# Patient Record
Sex: MALE | Race: OTHER | Employment: UNEMPLOYED | URBAN - METROPOLITAN AREA
[De-identification: names, ages, dates, MRNs, and addresses within clinical notes are randomized per-mention and may not be internally consistent; named-entity substitution may affect disease eponyms.]

---

## 2017-02-27 ENCOUNTER — TELEPHONE (OUTPATIENT)
Dept: INTERNAL MEDICINE CLINIC | Age: 62
End: 2017-02-27

## 2017-02-27 NOTE — TELEPHONE ENCOUNTER
Called the number listed but was unable to speak with anyone. I was sent to was a general mailbox. Did not leave a message.

## 2017-02-27 NOTE — TELEPHONE ENCOUNTER
Patient's caregiver called in and stated that the patient missed the 1 pm dose of the medication carafate. The group home stated that they do not know what to do. The next dose is at 6 pm. Please advise.

## 2017-02-28 ENCOUNTER — HOSPITAL ENCOUNTER (OUTPATIENT)
Dept: LAB | Age: 62
Discharge: HOME | End: 2017-02-28
Payer: MEDICARE

## 2017-02-28 ENCOUNTER — OFFICE VISIT (OUTPATIENT)
Dept: INTERNAL MEDICINE CLINIC | Age: 62
End: 2017-02-28

## 2017-02-28 VITALS
SYSTOLIC BLOOD PRESSURE: 116 MMHG | DIASTOLIC BLOOD PRESSURE: 74 MMHG | HEART RATE: 79 BPM | TEMPERATURE: 98.6 F | RESPIRATION RATE: 17 BRPM | OXYGEN SATURATION: 98 % | HEIGHT: 60 IN

## 2017-02-28 DIAGNOSIS — R11.2 NON-INTRACTABLE VOMITING WITH NAUSEA, UNSPECIFIED VOMITING TYPE: ICD-10-CM

## 2017-02-28 DIAGNOSIS — I10 ESSENTIAL HYPERTENSION: ICD-10-CM

## 2017-02-28 DIAGNOSIS — Z79.899 ENCOUNTER FOR LONG-TERM (CURRENT) USE OF MEDICATIONS: ICD-10-CM

## 2017-02-28 DIAGNOSIS — R05.9 COUGH: Primary | ICD-10-CM

## 2017-02-28 DIAGNOSIS — E78.5 DYSLIPIDEMIA: ICD-10-CM

## 2017-02-28 DIAGNOSIS — E55.9 VITAMIN D DEFICIENCY: ICD-10-CM

## 2017-02-28 LAB
ALBUMIN SERPL BCP-MCNC: 3 G/DL (ref 3.4–5)
ALBUMIN/GLOB SERPL: 0.8 {RATIO} (ref 0.8–1.7)
ALP SERPL-CCNC: 68 U/L (ref 45–117)
ALT SERPL-CCNC: 23 U/L (ref 16–61)
ANION GAP BLD CALC-SCNC: 10 MMOL/L (ref 3–18)
AST SERPL W P-5'-P-CCNC: 24 U/L (ref 15–37)
BASOPHILS # BLD AUTO: 0 K/UL (ref 0–0.06)
BASOPHILS # BLD: 0 % (ref 0–2)
BILIRUB SERPL-MCNC: 0.2 MG/DL (ref 0.2–1)
BUN SERPL-MCNC: 18 MG/DL (ref 7–18)
BUN/CREAT SERPL: 22 (ref 12–20)
CALCIUM SERPL-MCNC: 8.1 MG/DL (ref 8.5–10.1)
CHLORIDE SERPL-SCNC: 101 MMOL/L (ref 100–108)
CHOLEST SERPL-MCNC: 78 MG/DL
CO2 SERPL-SCNC: 26 MMOL/L (ref 21–32)
CREAT SERPL-MCNC: 0.83 MG/DL (ref 0.6–1.3)
DIFFERENTIAL METHOD BLD: ABNORMAL
EOSINOPHIL # BLD: 0 K/UL (ref 0–0.4)
EOSINOPHIL NFR BLD: 1 % (ref 0–5)
ERYTHROCYTE [DISTWIDTH] IN BLOOD BY AUTOMATED COUNT: 19 % (ref 11.6–14.5)
GLOBULIN SER CALC-MCNC: 3.7 G/DL (ref 2–4)
GLUCOSE SERPL-MCNC: 100 MG/DL (ref 74–99)
HBA1C MFR BLD: <3.5 % (ref 4.2–5.6)
HCT VFR BLD AUTO: 22.6 % (ref 36–48)
HDLC SERPL-MCNC: 31 MG/DL (ref 40–60)
HDLC SERPL: 2.5 {RATIO} (ref 0–5)
HGB BLD-MCNC: 6.2 G/DL (ref 13–16)
LDLC SERPL CALC-MCNC: 29.4 MG/DL (ref 0–100)
LIPID PROFILE,FLP: ABNORMAL
LYMPHOCYTES # BLD AUTO: 13 % (ref 21–52)
LYMPHOCYTES # BLD: 0.9 K/UL (ref 0.9–3.6)
MCH RBC QN AUTO: 18.2 PG (ref 24–34)
MCHC RBC AUTO-ENTMCNC: 27.4 G/DL (ref 31–37)
MCV RBC AUTO: 66.5 FL (ref 74–97)
MONOCYTES # BLD: 0.5 K/UL (ref 0.05–1.2)
MONOCYTES NFR BLD AUTO: 7 % (ref 3–10)
NEUTS SEG # BLD: 5.2 K/UL (ref 1.8–8)
NEUTS SEG NFR BLD AUTO: 79 % (ref 40–73)
PLATELET # BLD AUTO: 273 K/UL (ref 135–420)
PMV BLD AUTO: 10 FL (ref 9.2–11.8)
POTASSIUM SERPL-SCNC: 3.7 MMOL/L (ref 3.5–5.5)
PROT SERPL-MCNC: 6.7 G/DL (ref 6.4–8.2)
RBC # BLD AUTO: 3.4 M/UL (ref 4.7–5.5)
SODIUM SERPL-SCNC: 137 MMOL/L (ref 136–145)
T4 FREE SERPL-MCNC: 1.1 NG/DL (ref 0.7–1.5)
TRIGL SERPL-MCNC: 88 MG/DL (ref ?–150)
TSH SERPL DL<=0.05 MIU/L-ACNC: 2.54 UIU/ML (ref 0.36–3.74)
VLDLC SERPL CALC-MCNC: 17.6 MG/DL
WBC # BLD AUTO: 6.6 K/UL (ref 4.6–13.2)

## 2017-02-28 PROCEDURE — 80061 LIPID PANEL: CPT | Performed by: HOSPITALIST

## 2017-02-28 PROCEDURE — 84439 ASSAY OF FREE THYROXINE: CPT | Performed by: HOSPITALIST

## 2017-02-28 PROCEDURE — 83036 HEMOGLOBIN GLYCOSYLATED A1C: CPT | Performed by: HOSPITALIST

## 2017-02-28 PROCEDURE — 36415 COLL VENOUS BLD VENIPUNCTURE: CPT | Performed by: HOSPITALIST

## 2017-02-28 PROCEDURE — 84443 ASSAY THYROID STIM HORMONE: CPT | Performed by: HOSPITALIST

## 2017-02-28 PROCEDURE — 85025 COMPLETE CBC W/AUTO DIFF WBC: CPT | Performed by: HOSPITALIST

## 2017-02-28 PROCEDURE — 82306 VITAMIN D 25 HYDROXY: CPT | Performed by: HOSPITALIST

## 2017-02-28 PROCEDURE — 80053 COMPREHEN METABOLIC PANEL: CPT | Performed by: HOSPITALIST

## 2017-02-28 RX ORDER — METOCLOPRAMIDE 10 MG/1
10 TABLET ORAL
Qty: 40 TAB | Refills: 0 | Status: SHIPPED | OUTPATIENT
Start: 2017-02-28 | End: 2017-02-28 | Stop reason: SDUPTHER

## 2017-02-28 RX ORDER — METOCLOPRAMIDE 10 MG/1
10 TABLET ORAL
Qty: 40 TAB | Refills: 0 | Status: SHIPPED | OUTPATIENT
Start: 2017-02-28 | End: 2017-03-09

## 2017-02-28 RX ORDER — HYDROCODONE POLISTIREX AND CHLORPHENIRAMINE POLISTIREX 10; 8 MG/5ML; MG/5ML
1 SUSPENSION, EXTENDED RELEASE ORAL
Qty: 240 ML | Refills: 0 | Status: SHIPPED | OUTPATIENT
Start: 2017-02-28 | End: 2017-03-09

## 2017-02-28 NOTE — PATIENT INSTRUCTIONS
Nausea and Vomiting: Care Instructions  Your Care Instructions    When you are nauseated, you may feel weak and sweaty and notice a lot of saliva in your mouth. Nausea often leads to vomiting. Most of the time you do not need to worry about nausea and vomiting, but they can be signs of other illnesses. Two common causes of nausea and vomiting are stomach flu and food poisoning. Nausea and vomiting from viral stomach flu will usually start to improve within 24 hours. Nausea and vomiting from food poisoning may last from 12 to 48 hours. The doctor has checked you carefully, but problems can develop later. If you notice any problems or new symptoms, get medical treatment right away. Follow-up care is a key part of your treatment and safety. Be sure to make and go to all appointments, and call your doctor if you are having problems. It's also a good idea to know your test results and keep a list of the medicines you take. How can you care for yourself at home? · To prevent dehydration, drink plenty of fluids, enough so that your urine is light yellow or clear like water. Choose water and other caffeine-free clear liquids until you feel better. If you have kidney, heart, or liver disease and have to limit fluids, talk with your doctor before you increase the amount of fluids you drink. · Rest in bed until you feel better. · When you are able to eat, try clear soups, mild foods, and liquids until all symptoms are gone for 12 to 48 hours. Other good choices include dry toast, crackers, cooked cereal, and gelatin dessert, such as Jell-O. When should you call for help? Call 911 anytime you think you may need emergency care. For example, call if:  · You passed out (lost consciousness). Call your doctor now or seek immediate medical care if:  · You have symptoms of dehydration, such as:  ¨ Dry eyes and a dry mouth. ¨ Passing only a little dark urine.   ¨ Feeling thirstier than usual.  · You have new or worsening belly pain. · You have a new or higher fever. · You vomit blood or what looks like coffee grounds. Watch closely for changes in your health, and be sure to contact your doctor if:  · You have ongoing nausea and vomiting. · Your vomiting is getting worse. · Your vomiting lasts longer than 2 days. · You are not getting better as expected. Where can you learn more? Go to http://haven-magdi.info/. Enter 25 [id number] in the search box to learn more about \"Nausea and Vomiting: Care Instructions. \"  Current as of: May 27, 2016  Content Version: 11.1  © 2867-8633 [employer]. Care instructions adapted under license by [employer] (which disclaims liability or warranty for this information). If you have questions about a medical condition or this instruction, always ask your healthcare professional. [address] any warranty or liability for your use of this information. Cough: Care Instructions  Your Care Instructions  A cough is your body's response to something that bothers your throat or airways. Many things can cause a cough. You might cough because of a cold or the flu, bronchitis, or asthma. Smoking, postnasal drip, allergies, and stomach acid that backs up into your throat also can cause coughs. A cough is a symptom, not a disease. Most coughs stop when the cause, such as a cold, goes away. You can take a few steps at home to cough less and feel better. Follow-up care is a key part of your treatment and safety. Be sure to make and go to all appointments, and call your doctor if you are having problems. It's also a good idea to know your test results and keep a list of the medicines you take. How can you care for yourself at home? · Drink lots of water and other fluids. This helps thin the mucus and soothes a dry or sore throat. Honey or lemon juice in hot water or tea may ease a dry cough.   · Take cough medicine as directed by your doctor. · Prop up your head on pillows to help you breathe and ease a dry cough. · Try cough drops to soothe a dry or sore throat. Cough drops don't stop a cough. Medicine-flavored cough drops are no better than candy-flavored drops or hard candy. · Do not smoke. Avoid secondhand smoke. If you need help quitting, talk to your doctor about stop-smoking programs and medicines. These can increase your chances of quitting for good. When should you call for help? Call 911 anytime you think you may need emergency care. For example, call if:  · You have severe trouble breathing. Call your doctor now or seek immediate medical care if:  · You cough up blood. · You have new or worse trouble breathing. · You have a new or higher fever. · You have a new rash. Watch closely for changes in your health, and be sure to contact your doctor if:  · You cough more deeply or more often, especially if you notice more mucus or a change in the color of your mucus. · You have new symptoms, such as a sore throat, an earache, or sinus pain. · You do not get better as expected. Where can you learn more? Go to http://haven-magdi.info/. Enter D279 in the search box to learn more about \"Cough: Care Instructions. \"  Current as of: May 27, 2016  Content Version: 11.1  © 4003-3104 [employer]. Care instructions adapted under license by [employer] (which disclaims liability or warranty for this information). If you have questions about a medical condition or this instruction, always ask your healthcare professional. [place] [address] any warranty or liability for your use of this information.

## 2017-02-28 NOTE — PROGRESS NOTES
Chief Complaint   Patient presents with    Cough    Vomiting     Patient is here today with complaint of vomiting and coughing that started on Thursday. Per the caretaker, the symptoms started on Thursday and [facility] He was prescribed zofran as needed. Per care provider, the patient last vomited yesterday morning and vomited only once yesterday. She stated that prior to that, he vomited 5 times Thursday, Friday, Saturday and Sunday. Stated that flu test [facility] completed at [facility] and it came back negative. 1. Have you been to the ER, urgent care clinic since your last visit? Hospitalized since your last visit? Yes patient    2. Have you seen or consulted any other health care providers outside of the [address] [place] [address] since your last visit? Include any pap smears or colon screening.  No

## 2017-02-28 NOTE — MR AVS SNAPSHOT
Visit Information Date & Time Provider Department Dept. Phone Encounter #  
 2/28/2017  9:30 AM Leif Downey DO [facility] [phone] Your Appointments 3/7/2017  9:30 AM  
Office Visit with Leif Downey DO [facility] (--) Appt Note: 6 month follow up and labs [address] [place] [address] [phone]  
  
   
 [address] [place] [address] Upcoming Health Maintenance Date Due ZOSTER VACCINE AGE 60> 8/13/2014 DTaP/Tdap/Td series (2 - Td) 3/14/2022 Allergies as of 2/28/2017  Review Complete On: 2/28/2017 By: Leif Downey, DO No Known Allergies Current Immunizations  Reviewed on 8/29/2016 Name Date Influenza Vaccine 10/2/2014 Influenza Vaccine Renu Miu) 8/29/2016 Influenza Vaccine (Quad) PF 9/30/2015 TDAP Vaccine 3/14/2012 Not reviewed this visit You Were Diagnosed With   
  
 Codes Comments Cough    -  Primary ICD-10-CM: L34 ICD-9-CM: 786.2 Non-intractable vomiting with nausea, unspecified vomiting type     ICD-10-CM: R11.2 ICD-9-CM: 787.01 Vitals BP  
  
  
  
  
  
 116/74 (BP 1 Location: Left arm, BP Patient Position: Sitting) Vitals History Preferred Pharmacy Pharmacy Name Phone [facility] [address] [facility] 2/3 [phone] Your Updated Medication List  
  
   
This list is accurate as of: 2/28/17 10:00 AM.  Always use your most recent med list.  
  
  
  
  
 MALATHI PROTECT topical cream  
Generic drug:  dimethicone-zinc oxide APPLY TO BUTTOCKS AS NEEDED  
  
 bisacodyl 5 mg EC tablet Commonly known as:  DULCOLAX TAKE 2 TABLETS BY MOUTH ON MON WED FRI FOR LAXATIVE  
  
 carvedilol 3.125 mg tablet Commonly known as:  Jestine Pellet Take 1 Tab by mouth two (2) times daily (with meals). chlorpheniramine-HYDROcodone 10-8 mg/5 mL suspension Commonly known as:  Floyce Yvonne Take 5 mL by mouth every twelve (12) hours as needed for Cough. Max Daily Amount: 10 mL. cyanocobalamin 2,500 mcg sublingual tablet Commonly known as:  VITAMIN B12 Take 1 Tab by mouth daily. docosanol 10 % topical cream  
Commonly known as:  Silvio Center Apply  to affected area five (5) times daily. enalapril 10 mg tablet Commonly known as:  Yasmin Henry Take 1 Tab by mouth two (2) times a day. ergocalciferol 50,000 unit capsule Commonly known as:  ERGOCALCIFEROL Take 1 Cap by mouth every seven (7) days. hydroCHLOROthiazide 25 mg tablet Commonly known as:  HYDRODIURIL Take 1 Tab by mouth daily. Iron [id number]-Suc-Stoma tablet Commonly known as:  Kirk Gold Take 1 Tab by mouth two (2) times a day. metoclopramide HCl 10 mg tablet Commonly known as:  REGLAN Take 1 Tab by mouth Before breakfast, lunch, dinner and at bedtime for 10 days. morphine CR 15 mg CR tablet Commonly known as:  MS CONTIN Take 1 Tab by mouth two (2) times daily as needed. Max Daily Amount: 30 mg.  
  
 multivit with min-FA-lycopene 400-300 mcg Tab Commonly known as:  ONE-A-DAY MEN'S MULTIVITAMIN Take 1 Each by mouth daily. neomycin-bacitracnZn-polymyxnB 3.5-400-5,000 mg-unit-unit Oipk ointment Commonly known as:  TRIPLE ANTIBIOTIC Apply 1 Packet to affected area two (2) times a day. * OTHER May use applesauce for oral medication administration. * OTHER Listerine to use with oral swabs for mouth care twice a day. oxyCODONE IR 5 mg immediate release tablet Commonly known as:  Charolet Music Take 1-2 Tabs by mouth every four (4) hours as needed for Pain. Max Daily Amount: 60 mg.  
  
 pantoprazole 40 mg tablet Commonly known as:  PROTONIX  
TAKE 1 TABLET BY MOUTH TWICE DAILY FOR GERD potassium chloride 10 mEq tablet Commonly known as:  K-DUR, KLOR-CON  
TAKE 1 TABLET BY MOUTH ONCE DAILY FOR LOW BLOOD LEVELS (MAY CRUSH ANDTAKE WITH APPLESAUCE)  
  
 raNITIdine 150 mg tablet Commonly known as:  ZANTAC Take 1 Tab by mouth two (2) times a day. senna-docusate 8.6-50 mg per tablet Commonly known as:  Kasie Mt Take 1 Tab by mouth two (2) times a day. simvastatin 20 mg tablet Commonly known as:  ZOCOR Take 1 Tab by mouth nightly. sorbitol 70 % solution TAKE 30ML BY MOUTH TWICE DAILY FOR LAXATIVE  
  
 sucralfate 1 gram tablet Commonly known as:  CARAFATE  
TAKE 1 TABLET BY MOUTH FOUR TIMES DAILY (AFTER MEALS & AT BEDTIME) FOR GERD Swab Swab Commonly known as:  TOOTHETTE Sig: Use as needed * Notice: This list has 2 medication(s) that are the same as other medications prescribed for you. Read the directions carefully, and ask your doctor or other care provider to review them with you. Prescriptions Printed Refills  
 chlorpheniramine-HYDROcodone (TUSSIONEX) 10-8 mg/5 mL suspension 0 Sig: Take 5 mL by mouth every twelve (12) hours as needed for Cough. Max Daily Amount: 10 mL. Class: Print Route: Oral  
 metoclopramide HCl (REGLAN) 10 mg tablet 0 Sig: Take 1 Tab by mouth Before breakfast, lunch, dinner and at bedtime for 10 days. Class: Print Route: Oral  
  
Patient Instructions Nausea and Vomiting: Care Instructions Your Care Instructions When you are nauseated, you may feel weak and sweaty and notice a lot of saliva in your mouth. Nausea often leads to vomiting. Most of the time you do not need to worry about nausea and vomiting, but they can be signs of other illnesses. Two common causes of nausea and vomiting are stomach flu and food poisoning. Nausea and vomiting from viral stomach flu will usually start to improve within 24 hours. Nausea and vomiting from food poisoning may last from 12 to 48 hours. The doctor has checked you carefully, but problems can develop later. If you notice any problems or new symptoms, get medical treatment right away. Follow-up care is a key part of your treatment and safety. Be sure to make and go to all appointments, and call your doctor if you are having problems. It's also a good idea to know your test results and keep a list of the medicines you take. How can you care for yourself at home? · To prevent dehydration, drink plenty of fluids, enough so that your urine is light yellow or clear like water. Choose water and other caffeine-free clear liquids until you feel better. If you have kidney, heart, or liver disease and have to limit fluids, talk with your doctor before you increase the amount of fluids you drink. · Rest in bed until you feel better. · When you are able to eat, try clear soups, mild foods, and liquids until all symptoms are gone for 12 to 48 hours. Other good choices include dry toast, crackers, cooked cereal, and gelatin dessert, such as Jell-O. When should you call for help? Call 911 anytime you think you may need emergency care. For example, call if: 
· You passed out (lost consciousness). Call your doctor now or seek immediate medical care if: 
· You have symptoms of dehydration, such as: ¨ Dry eyes and a dry mouth. ¨ Passing only a little dark urine. ¨ Feeling thirstier than usual. 
· You have new or worsening belly pain. · You have a new or higher fever. · You vomit blood or what looks like coffee grounds. Watch closely for changes in your health, and be sure to contact your doctor if: 
· You have ongoing nausea and vomiting. · Your vomiting is getting worse. · Your vomiting lasts longer than 2 days. · You are not getting better as expected. Where can you learn more? Go to http://haven-magdi.info/. Enter 25 [id number] in the search box to learn more about \"Nausea and Vomiting: Care Instructions. \" Current as of: May 27, 2016 Content Version: 11.1 © 8733-1898 [employer]. Care instructions adapted under license by [employer] (which disclaims liability or warranty for this information). If you have questions about a medical condition or this instruction, always ask your healthcare professional. [address] any warranty or liability for your use of this information. Cough: Care Instructions Your Care Instructions A cough is your body's response to something that bothers your throat or airways. Many things can cause a cough. You might cough because of a cold or the flu, bronchitis, or asthma. Smoking, postnasal drip, allergies, and stomach acid that backs up into your throat also can cause coughs. A cough is a symptom, not a disease. Most coughs stop when the cause, such as a cold, goes away. You can take a few steps at home to cough less and feel better. Follow-up care is a key part of your treatment and safety. Be sure to make and go to all appointments, and call your doctor if you are having problems. It's also a good idea to know your test results and keep a list of the medicines you take. How can you care for yourself at home? · Drink lots of water and other fluids. This helps thin the mucus and soothes a dry or sore throat. Honey or lemon juice in hot water or tea may ease a dry cough. · Take cough medicine as directed by your doctor. · Prop up your head on pillows to help you breathe and ease a dry cough. · Try cough drops to soothe a dry or sore throat. Cough drops don't stop a cough. Medicine-flavored cough drops are no better than candy-flavored drops or hard candy. · Do not smoke. Avoid secondhand smoke. If you need help quitting, talk to your doctor about stop-smoking programs and medicines. These can increase your chances of quitting for good. When should you call for help? Call 911 anytime you think you may need emergency care. For example, call if: · You have severe trouble breathing. Call your doctor now or seek immediate medical care if: 
· You cough up blood. · You have new or worse trouble breathing. · You have a new or higher fever. · You have a new rash. Watch closely for changes in your health, and be sure to contact your doctor if: 
· You cough more deeply or more often, especially if you notice more mucus or a change in the color of your mucus. · You have new symptoms, such as a sore throat, an earache, or sinus pain. · You do not get better as expected. Where can you learn more? Go to http://haven-magdi.info/. Enter D279 in the search box to learn more about \"Cough: Care Instructions. \" Current as of: May 27, 2016 Content Version: 11.1 © 6276-7458 [employer]. Care instructions adapted under license by [employer] (which disclaims liability or warranty for this information). If you have questions about a medical condition or this instruction, always ask your healthcare professional. [address] any warranty or liability for your use of this information. Introducing [facility]! Pratibha Wilson introduces Euro[employer] patient portal. Now you can access parts of your medical record, email your doctor's office, and request medication refills online. 1. In your internet browser, go to [email or web address] 2. Click on the First Time User? Click Here link in the Sign In box. You will see the New Member Sign Up page. 3. Enter your Eurocept Access Code exactly as it appears below. You will not need to use this code after youve completed the sign-up process. If you do not sign up before the expiration date, you must request a new code. · [employer] Access Code: [email or web address] Expires: 5/29/2017 10:00 AM 
 
4. Enter the last four digits of your Social Security Number (xxxx) and Date of Birth (mm/dd/yyyy) as indicated and click Submit.  You will be taken to the next sign-up page. 5. Create a Callision ID. This will be your Callision login ID and cannot be changed, so think of one that is secure and easy to remember. 6. Create a Callision password. You can change your password at any time. 7. Enter your Password Reset Question and Answer. This can be used at a later time if you forget your password. 8. Enter your e-mail address. You will receive e-mail notification when new information is available in [address] 9. Click Sign Up. You can now view and download portions of your medical record. 10. Click the Download Summary menu link to download a portable copy of your medical information. If you have questions, please visit the Frequently Asked Questions section of the Callision website. Remember, Callision is NOT to be used for urgent needs. For medical emergencies, dial 911. Now available from your iPhone and Android! Please provide this summary of care documentation to your next provider. Your primary care clinician is listed as [address] If you have any questions after today's visit, please call [phone].

## 2017-02-28 NOTE — PROGRESS NOTES
Chief Complaint   Patient presents with    Cough    Vomiting       HPI:  Patient is a 57year old  male with medical problems listed below presents today for an acute visit with cough and vomiting. He was accompanied by his  Ms. Vanna Christianson to today's visit who stated that he started coughing five days ago and was followed by vomiting, as he vomited multiple times with vomitus non bilious and without blood. He was seen the next day at [facility] and was given Zofran as needed. Despite taking Zofran, he continues to vomit and has vomited several times in the last few days and thus was brought in today for evaluation. 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 Past Medical History:   Diagnosis Date    Constipation, chronic     Dyslipidemia 4/8/2014    Femur fracture ([address] 7/7/16    brace at right femur    GERD (gastroesophageal reflux disease)     Hypertension     MR (mental retardation), severe     Muscle weakness of lower extremity     with spasticity    Urinary incontinence due to cognitive impairment 8/23/2012       No Known Allergies      Current Outpatient Prescriptions   Medication Sig Dispense Refill    MALATHI PROTECT topical cream APPLY TO BUTTOCKS AS NEEDED 142 g 0    potassium chloride (K-DUR, KLOR-CON) 10 mEq tablet TAKE 1 TABLET BY MOUTH ONCE DAILY FOR LOW BLOOD LEVELS (MAY CRUSH ANDTAKE WITH APPLESAUCE) 30 Tab 0    senna-docusate (PERICOLACE) 8.6-50 mg per tablet Take 1 Tab by mouth two (2) times a day. 180 Tab 1    carvedilol (COREG) 3.125 mg tablet Take 1 Tab by mouth two (2) times daily (with meals). 180 Tab 1    morphine CR (MS CONTIN) 15 mg CR tablet Take 1 Tab by mouth two (2) times daily as needed.  Max Daily Amount: 30 mg. 10 Tab 0    neomycin-bacitracnZn-polymyxnB (TRIPLE ANTIBIOTIC) 3.5-400-5,000 mg-unit-unit oipk ointment Apply 1 Packet to affected area two (2) times a day. 1 Packet 0    enalapril (VASOTEC) 10 mg tablet Take 1 Tab by mouth two (2) times a day. 180 Tab 3    ranitidine (ZANTAC) 150 mg tablet Take 1 Tab by mouth two (2) times a day. 180 Tab 2    sucralfate (CARAFATE) 1 gram tablet TAKE 1 TABLET BY MOUTH FOUR TIMES DAILY (AFTER MEALS & AT BEDTIME) FOR GERD 120 Tab 3    oxyCODONE IR (ROXICODONE) 5 mg immediate release tablet Take 1-2 Tabs by mouth every four (4) hours as needed for Pain. Max Daily Amount: 60 mg. 60 Tab 0    ergocalciferol (ERGOCALCIFEROL) 50,000 unit capsule Take 1 Cap by mouth every seven (7) days. 4 Cap 3    simvastatin (ZOCOR) 20 mg tablet Take 1 Tab by mouth nightly. 90 Tab 3    multivit with min-FA-lycopene (ONE-A-DAY MEN'S MULTIVITAMIN) 400-300 mcg tab Take 1 Each by mouth daily. 90 Each 3    Swab (TOOTHETTE) swab Sig: Use as needed 600 Each 3    hydrochlorothiazide (HYDRODIURIL) 25 mg tablet Take 1 Tab by mouth daily. 30 Tab 5    sorbitol 70 % solution TAKE 30ML BY MOUTH TWICE DAILY FOR LAXATIVE 473 mL PRN    OTHER May use applesauce for oral medication administration. 1 Each 0    OTHER Listerine to use with oral swabs for mouth care twice a day. 1 Bottle 11    cyanocobalamin (VITAMIN B12) 2,500 mcg sublingual tablet Take 1 Tab by mouth daily. 30 Tab 6    Iron XazIre-I-S93-Ca-Suc-Stoma (MULTIGEN) tablet Take 1 Tab by mouth two (2) times a day. 60 Tab 3    pantoprazole (PROTONIX) 40 mg tablet TAKE 1 TABLET BY MOUTH TWICE DAILY FOR GERD 60 Tab 3    docosanol (ABREVA) 10 % topical cream Apply  to affected area five (5) times daily.  2 g 0    bisacodyl (DULCOLAX) 5 mg EC tablet TAKE 2 TABLETS BY MOUTH ON MON WED FRI FOR LAXATIVE 24 Tab 2          ROS:  Unable to review as pt mentally challenged        Physical Exam:  Visit Vitals    /74 (BP 1 Location: Left arm, BP Patient Position: Sitting)    Pulse 79    Temp 98.6 °F (37 °C) (Axillary)    Resp 17    Ht 4' 10\" (1.473 m)    SpO2 98%     General: Black male in wheelchair, awake, afebrile, in no acute distress  Respiratory: symmetrical chest expansion, lung sounds clear bilaterally, good air entry  Cardiovascular: normal S1S2, regular rate and rhythm  Abdomen: soft, non-distended, normoactive bowel sounds x 4 quadrants, and no palpable organomegaly noted. Assessment/Plan:    ICD-10-CM ICD-9-CM    1. Cough R05 786.2 Tussionex started today  chlorpheniramine-HYDROcodone (TUSSIONEX) 10-8 mg/5 mL suspension   2. Non-intractable vomiting with nausea, unspecified vomiting type R11.2 787.01 Reglan started today  metoclopramide HCl (REGLAN) 10 mg tablet      DISCONTINUED: metoclopramide HCl (REGLAN) 10 mg tablet         Orders Placed This Encounter    DISCONTD: metoclopramide HCl (REGLAN) 10 mg tablet     Sig: Take 1 Tab by mouth Before breakfast, lunch, dinner and at bedtime for 10 days. Dispense:  40 Tab     Refill:  0    chlorpheniramine-HYDROcodone (TUSSIONEX) 10-8 mg/5 mL suspension     Sig: Take 5 mL by mouth every twelve (12) hours as needed for Cough. Max Daily Amount: 10 mL. Dispense:  240 mL     Refill:  0    metoclopramide HCl (REGLAN) 10 mg tablet     Sig: Take 1 Tab by mouth Before breakfast, lunch, dinner and at bedtime for 10 days. Dispense:  40 Tab     Refill:  0         Group home paperwork filled out        Additional Notes: Discussed today's diagnosis, treatment plans. Discussed medication indications and side effects. After Visit Summary: Discussed provided printed patient instructions. Answered questions accordingly.   Follow-up Disposition: As previously scheduled        Abdi Moseley DO, MPH  Internal Medicine

## 2017-02-28 NOTE — TELEPHONE ENCOUNTER
Called to follow up with the providers message. Was informed that \"cherry thuy\" no longer works at the facility nor has she for a while. Was informed that the person that called was ventura khoury not cherry thuy. Pt continued dose as prescribed.

## 2017-03-01 LAB — 25(OH)D3 SERPL-MCNC: 108.6 NG/ML (ref 30–100)

## 2017-03-07 ENCOUNTER — HOSPITAL ENCOUNTER (OUTPATIENT)
Dept: LAB | Age: 62
Discharge: HOME | End: 2017-03-07
Payer: MEDICARE

## 2017-03-07 ENCOUNTER — OFFICE VISIT (OUTPATIENT)
Dept: INTERNAL MEDICINE CLINIC | Age: 62
End: 2017-03-07

## 2017-03-07 VITALS
TEMPERATURE: 97.8 F | SYSTOLIC BLOOD PRESSURE: 104 MMHG | RESPIRATION RATE: 16 BRPM | HEIGHT: 60 IN | HEART RATE: 96 BPM | DIASTOLIC BLOOD PRESSURE: 68 MMHG | OXYGEN SATURATION: 98 %

## 2017-03-07 DIAGNOSIS — D62 ACUTE BLOOD LOSS ANEMIA: ICD-10-CM

## 2017-03-07 DIAGNOSIS — E78.5 DYSLIPIDEMIA: ICD-10-CM

## 2017-03-07 DIAGNOSIS — I10 ESSENTIAL HYPERTENSION: Primary | ICD-10-CM

## 2017-03-07 DIAGNOSIS — K21.9 GASTROESOPHAGEAL REFLUX DISEASE WITHOUT ESOPHAGITIS: ICD-10-CM

## 2017-03-07 DIAGNOSIS — E55.9 VITAMIN D DEFICIENCY: ICD-10-CM

## 2017-03-07 DIAGNOSIS — K59.09 CONSTIPATION, CHRONIC: ICD-10-CM

## 2017-03-07 LAB
BASOPHILS # BLD AUTO: 0 K/UL (ref 0–0.06)
BASOPHILS # BLD: 0 % (ref 0–2)
DIFFERENTIAL METHOD BLD: ABNORMAL
EOSINOPHIL # BLD: 0.2 K/UL (ref 0–0.4)
EOSINOPHIL NFR BLD: 3 % (ref 0–5)
ERYTHROCYTE [DISTWIDTH] IN BLOOD BY AUTOMATED COUNT: 20.2 % (ref 11.6–14.5)
HCT VFR BLD AUTO: 24.1 % (ref 36–48)
HGB BLD-MCNC: 6.4 G/DL (ref 13–16)
LYMPHOCYTES # BLD AUTO: 22 % (ref 21–52)
LYMPHOCYTES # BLD: 1.6 K/UL (ref 0.9–3.6)
MCH RBC QN AUTO: 17.6 PG (ref 24–34)
MCHC RBC AUTO-ENTMCNC: 26.6 G/DL (ref 31–37)
MCV RBC AUTO: 66.2 FL (ref 74–97)
MONOCYTES # BLD: 0.9 K/UL (ref 0.05–1.2)
MONOCYTES NFR BLD AUTO: 12 % (ref 3–10)
NEUTS SEG # BLD: 4.6 K/UL (ref 1.8–8)
NEUTS SEG NFR BLD AUTO: 63 % (ref 40–73)
PLATELET # BLD AUTO: 687 K/UL (ref 135–420)
PMV BLD AUTO: 10 FL (ref 9.2–11.8)
RBC # BLD AUTO: 3.64 M/UL (ref 4.7–5.5)
WBC # BLD AUTO: 7.4 K/UL (ref 4.6–13.2)

## 2017-03-07 PROCEDURE — 36415 COLL VENOUS BLD VENIPUNCTURE: CPT | Performed by: HOSPITALIST

## 2017-03-07 PROCEDURE — 85025 COMPLETE CBC W/AUTO DIFF WBC: CPT | Performed by: HOSPITALIST

## 2017-03-07 NOTE — PATIENT INSTRUCTIONS
DASH Diet: Care Instructions  Your Care Instructions  The DASH diet is an eating plan that can help lower your blood pressure. DASH stands for Dietary Approaches to Stop Hypertension. Hypertension is high blood pressure. The DASH diet focuses on eating foods that are high in calcium, potassium, and magnesium. These nutrients can lower blood pressure. The foods that are highest in these nutrients are fruits, vegetables, low-fat dairy products, nuts, seeds, and legumes. But taking calcium, potassium, and magnesium supplements instead of eating foods that are high in those nutrients does not have the same effect. The DASH diet also includes whole grains, fish, and poultry. The DASH diet is one of several lifestyle changes your doctor may recommend to lower your high blood pressure. Your doctor may also want you to decrease the amount of sodium in your diet. Lowering sodium while following the DASH diet can lower blood pressure even further than just the DASH diet alone. Follow-up care is a key part of your treatment and safety. Be sure to make and go to all appointments, and call your doctor if you are having problems. It's also a good idea to know your test results and keep a list of the medicines you take. How can you care for yourself at home? Following the DASH diet  · Eat 4 to 5 servings of fruit each day. A serving is 1 medium-sized piece of fruit, ½ cup chopped or canned fruit, 1/4 cup dried fruit, or 4 ounces (½ cup) of fruit juice. Choose fruit more often than fruit juice. · Eat 4 to 5 servings of vegetables each day. A serving is 1 cup of lettuce or raw leafy vegetables, ½ cup of chopped or cooked vegetables, or 4 ounces (½ cup) of vegetable juice. Choose vegetables more often than vegetable juice. · Get 2 to 3 servings of low-fat and fat-free dairy each day. A serving is 8 ounces of milk, 1 cup of yogurt, or 1 ½ ounces of cheese. · Eat 6 to 8 servings of grains each day.  A serving is 1 slice of bread, 1 ounce of dry cereal, or ½ cup of cooked rice, pasta, or cooked cereal. Try to choose whole-grain products as much as possible. · Limit lean meat, poultry, and fish to 2 servings each day. A serving is 3 ounces, about the size of a deck of cards. · Eat 4 to 5 servings of nuts, seeds, and legumes (cooked dried beans, lentils, and split peas) each week. A serving is 1/3 cup of nuts, 2 tablespoons of seeds, or ½ cup of cooked beans or peas. · Limit fats and oils to 2 to 3 servings each day. A serving is 1 teaspoon of vegetable oil or 2 tablespoons of salad dressing. · Limit sweets and added sugars to 5 servings or less a week. A serving is 1 tablespoon jelly or jam, ½ cup sorbet, or 1 cup of lemonade. · Eat less than 2,300 milligrams (mg) of sodium a day. If you limit your sodium to 1,500 mg a day, you can lower your blood pressure even more. Tips for success  · Start small. Do not try to make dramatic changes to your diet all at once. You might feel that you are missing out on your favorite foods and then be more likely to not follow the plan. Make small changes, and stick with them. Once those changes become habit, add a few more changes. · Try some of the following:  ¨ Make it a goal to eat a fruit or vegetable at every meal and at snacks. This will make it easy to get the recommended amount of fruits and vegetables each day. ¨ Try yogurt topped with fruit and nuts for a snack or healthy dessert. ¨ Add lettuce, tomato, cucumber, and onion to sandwiches. ¨ Combine a ready-made pizza crust with low-fat mozzarella cheese and lots of vegetable toppings. Try using tomatoes, squash, spinach, broccoli, carrots, cauliflower, and onions. ¨ Have a variety of cut-up vegetables with a low-fat dip as an appetizer instead of chips and dip. ¨ Sprinkle sunflower seeds or chopped almonds over salads. Or try adding chopped walnuts or almonds to cooked vegetables. ¨ Try some vegetarian meals using beans and peas. Add garbanzo or kidney beans to salads. Make burritos and tacos with mashed vásquez beans or black beans. Where can you learn more? Go to http://haven-magdi.info/. Enter D345 in the search box to learn more about \"DASH Diet: Care Instructions. \"  Current as of: March 23, 2016  Content Version: 11.1  © 5733-0921 [employer]. Care instructions adapted under license by [employer] (which disclaims liability or warranty for this information). If you have questions about a medical condition or this instruction, always ask your healthcare professional. [place] [address] any warranty or liability for your use of this information. Anemia: Care Instructions  Your Care Instructions    Anemia is a low level of red blood cells, which carry oxygen throughout your body. Many things can cause anemia. Lack of iron is one of the most common causes. Your body needs iron to make hemoglobin, a substance in red blood cells that carries oxygen from the lungs to your body's cells. Without enough iron, the body produces fewer and smaller red blood cells. As a result, your body's cells do not get enough oxygen, and you feel tired and weak. And you may have trouble concentrating. Bleeding is the most common cause of a lack of iron. You may have heavy menstrual bleeding or bleeding caused by conditions such as ulcers, hemorrhoids, or cancer. Regular use of aspirin or other anti-inflammatory medicines (such as ibuprofen) also can cause bleeding in some people. A lack of iron in your diet also can cause anemia, especially at times when the body needs more iron, such as during pregnancy, infancy, and the teen years. Your doctor may have prescribed iron pills. It may take several months of treatment for your iron levels to return to normal. Your doctor also may suggest that you eat foods that are rich in iron, such as meat and beans. There are many other causes of anemia.  It is not always due to a lack of iron. Finding the specific cause of your anemia will help your doctor find the right treatment for you. Follow-up care is a key part of your treatment and safety. Be sure to make and go to all appointments, and call your doctor if you are having problems. It's also a good idea to know your test results and keep a list of the medicines you take. How can you care for yourself at home? · Take your medicines exactly as prescribed. Call your doctor if you think you are having a problem with your medicine. · If your doctor recommends iron pills, take them as directed:  ¨ Try to take the pills on an empty stomach about 1 hour before or 2 hours after meals. But you may need to take iron with food to avoid an upset stomach. ¨ Do not take antacids or drink milk or caffeine drinks (such as coffee, tea, or cola) at the same time or within 2 hours of the time that you take your iron. They can make it hard for your body to absorb the iron. ¨ Vitamin C (from food or supplements) helps your body absorb iron. Try taking iron pills with a glass of orange juice or some other food that is high in vitamin C, such as citrus fruits. ¨ Iron pills may cause stomach problems, such as heartburn, nausea, diarrhea, constipation, and cramps. Be sure to drink plenty of fluids, and include fruits, vegetables, and fiber in your diet each day. Iron pills often make your bowel movements dark or green. ¨ If you forget to take an iron pill, do not take a double dose of iron the next time you take a pill. ¨ Keep iron pills out of the reach of small children. An overdose of iron can be very dangerous. · Follow your doctor's advice about eating iron-rich foods. These include red meat, shellfish, poultry, eggs, beans, raisins, whole-grain bread, and leafy green vegetables. · Steam vegetables to help them keep their iron content. When should you call for help? Call 911 anytime you think you may need emergency care. For example, call if:  · You have symptoms of a heart attack. These may include:  ¨ Chest pain or pressure, or a strange feeling in the chest.  ¨ Sweating. ¨ Shortness of breath. ¨ Nausea or vomiting. ¨ Pain, pressure, or a strange feeling in the back, neck, jaw, or upper belly or in one or both shoulders or arms. ¨ Lightheadedness or sudden weakness. ¨ A fast or irregular heartbeat. After you call 911, the  may tell you to chew 1 adult-strength or 2 to 4 low-dose aspirin. Wait for an ambulance. Do not try to drive yourself. · You passed out (lost consciousness). Call your doctor now or seek immediate medical care if:  · You have new or increased shortness of breath. · You are dizzy or lightheaded, or you feel like you may faint. · Your fatigue and weakness continue or get worse. · You have any abnormal bleeding, such as:  ¨ Nosebleeds. ¨ Vaginal bleeding that is different (heavier, more frequent, at a different time of the month) than what you are used to. ¨ Bloody or black stools, or rectal bleeding. ¨ Bloody or pink urine. Watch closely for changes in your health, and be sure to contact your doctor if:  · You do not get better as expected. Where can you learn more? Go to http://haven-magdi.info/. Enter R301 in the search box to learn more about \"Anemia: Care Instructions. \"  Current as of: February 5, 2016  Content Version: 11.1  © 9054-6805 [employer]. Care instructions adapted under license by [employer] (which disclaims liability or warranty for this information). If you have questions about a medical condition or this instruction, always ask your healthcare professional. [place] [address] any warranty or liability for your use of this information.

## 2017-03-07 NOTE — PROGRESS NOTES
Chief Complaint   Patient presents with    Results       HPI:  Patient is a 57year old  male with medical problems listed below presents today for follow up of GERD, Hypertension, Hyperlipidemia, chronic constipation, acute blood loss anemia, etc. He was accompanied by his  Ms. Alma Jauregui who stated that he has been feeling well with no significant complaints reported today. In addition, she also stated that he has been taking his medications with no adverse effects noted. Past Medical History:   Diagnosis Date    Constipation, chronic     Dyslipidemia 4/8/2014    Femur fracture ([address] 7/7/16    brace at right femur    GERD (gastroesophageal reflux disease)     Hypertension     MR (mental retardation), severe     Muscle weakness of lower extremity     with spasticity    Urinary incontinence due to cognitive impairment 8/23/2012       No Known Allergies      Current Outpatient Prescriptions   Medication Sig Dispense Refill    chlorpheniramine-HYDROcodone (TUSSIONEX) 10-8 mg/5 mL suspension Take 5 mL by mouth every twelve (12) hours as needed for Cough. Max Daily Amount: 10 mL. 240 mL 0    metoclopramide HCl (REGLAN) 10 mg tablet Take 1 Tab by mouth Before breakfast, lunch, dinner and at bedtime for 10 days. 40 Tab 0    MALATHI PROTECT topical cream APPLY TO BUTTOCKS AS NEEDED 142 g 0    potassium chloride (K-DUR, KLOR-CON) 10 mEq tablet TAKE 1 TABLET BY MOUTH ONCE DAILY FOR LOW BLOOD LEVELS (MAY CRUSH ANDTAKE WITH APPLESAUCE) 30 Tab 0    senna-docusate (PERICOLACE) 8.6-50 mg per tablet Take 1 Tab by mouth two (2) times a day. 180 Tab 1    carvedilol (COREG) 3.125 mg tablet Take 1 Tab by mouth two (2) times daily (with meals). 180 Tab 1    morphine CR (MS CONTIN) 15 mg CR tablet Take 1 Tab by mouth two (2) times daily as needed.  Max Daily Amount: 30 mg. 10 Tab 0    neomycin-bacitracnZn-polymyxnB (TRIPLE ANTIBIOTIC) 3.5-400-5,000 mg-unit-unit oipk ointment Apply 1 Packet to affected area two (2) times a day. 1 Packet 0    enalapril (VASOTEC) 10 mg tablet Take 1 Tab by mouth two (2) times a day. 180 Tab 3    ranitidine (ZANTAC) 150 mg tablet Take 1 Tab by mouth two (2) times a day. 180 Tab 2    sucralfate (CARAFATE) 1 gram tablet TAKE 1 TABLET BY MOUTH FOUR TIMES DAILY (AFTER MEALS & AT BEDTIME) FOR GERD 120 Tab 3    oxyCODONE IR (ROXICODONE) 5 mg immediate release tablet Take 1-2 Tabs by mouth every four (4) hours as needed for Pain. Max Daily Amount: 60 mg. 60 Tab 0    ergocalciferol (ERGOCALCIFEROL) 50,000 unit capsule Take 1 Cap by mouth every seven (7) days. 4 Cap 3    simvastatin (ZOCOR) 20 mg tablet Take 1 Tab by mouth nightly. 90 Tab 3    multivit with min-FA-lycopene (ONE-A-DAY MEN'S MULTIVITAMIN) 400-300 mcg tab Take 1 Each by mouth daily. 90 Each 3    Swab (TOOTHETTE) swab Sig: Use as needed 600 Each 3    hydrochlorothiazide (HYDRODIURIL) 25 mg tablet Take 1 Tab by mouth daily. 30 Tab 5    sorbitol 70 % solution TAKE 30ML BY MOUTH TWICE DAILY FOR LAXATIVE 473 mL PRN    OTHER May use applesauce for oral medication administration. 1 Each 0    OTHER Listerine to use with oral swabs for mouth care twice a day. 1 Bottle 11    cyanocobalamin (VITAMIN B12) 2,500 mcg sublingual tablet Take 1 Tab by mouth daily. 30 Tab 6    Iron IsdNvu-D-C74-Ca-Suc-Stoma (MULTIGEN) tablet Take 1 Tab by mouth two (2) times a day. 60 Tab 3    pantoprazole (PROTONIX) 40 mg tablet TAKE 1 TABLET BY MOUTH TWICE DAILY FOR GERD 60 Tab 3    docosanol (ABREVA) 10 % topical cream Apply  to affected area five (5) times daily.  2 g 0    bisacodyl (DULCOLAX) 5 mg EC tablet TAKE 2 TABLETS BY MOUTH ON MON WED FRI FOR LAXATIVE 24 Tab 2          ROS:  Unable to review as pt mentally challenged        Physical Exam:  Visit Vitals    /68 (BP 1 Location: Left arm, BP Patient Position: Sitting)    Pulse 96    Temp 97.8 °F (36.6 °C) (Oral)    Resp 16    Ht 4' 10\" (1.473 m)    SpO2 98% General: Black male in wheelchair, sleeping, afebrile, in no acute distress  Respiratory: symmetrical chest expansion, lung sounds clear bilaterally, good air entry, good respiratory effort, no wheezes or crackles  Cardiovascular: normal S1S2, regular rate and rhythm, no murmurs, pulses palpable, no thrill, no carotid or abdominal bruits, no JVD  Abdomen: soft, non-distended, normoactive bowel sounds x 4 quadrants. Extremity: No edema noted      Assessment/Plan:    ICD-10-CM ICD-9-CM    1. Essential hypertension I10 401.9 Well controlled. Continue current meds   2. Dyslipidemia E78.5 272.4 Recent lipid panel done 2/28/17 well controlled. Continue Simvastatin. 3. Vitamin D deficiency E55.9 268.9 Recent Vit D is good   4. Gastroesophageal reflux disease without esophagitis K21.9 530.81 Stable   5. Constipation, chronic K59.09 564.00 Stable  Continue current meds   6. Acute blood loss anemia D62 285.1 Recent H/H checked 2/28/17 low at 6.2/22.6 though pt is asymptomatic. Will repeat CBC today and if it remains low on repeat testing, will transfuse and refer to GI  CBC WITH AUTOMATED DIFF         Orders Placed This Encounter    CBC WITH AUTOMATED DIFF     Standing Status:   Future     Number of Occurrences:   1     Standing Expiration Date:   3/8/2018         Recent labs reviewed today      Group home paperwork filled out        Additional Notes: Discussed today's diagnosis, treatment plans. Discussed medication indications and side effects. After Visit Summary: Discussed provided printed patient instructions. Answered questions accordingly. Follow-up Disposition:  In 2 days to review labs        Leif Downey DO, MPH  Internal Medicine

## 2017-03-07 NOTE — PROGRESS NOTES
1. Have you been to the ER, urgent care clinic since your last visit? Hospitalized since your last visit? No    2. Have you seen or consulted any other health care providers outside of the [address] [place] [address] since your last visit? Include any pap smears or colon screening.  No

## 2017-03-07 NOTE — MR AVS SNAPSHOT
Visit Information Date & Time Provider Department Dept. Phone Encounter #  
 3/7/2017 10:30 AM Can Hernández DO [facility] 8 [id number] Follow-up Instructions Return in about 2 days (around 3/9/2017) for to review labs. Upcoming Health Maintenance Date Due ZOSTER VACCINE AGE 60> 8/13/2014 DTaP/Tdap/Td series (2 - Td) 3/14/2022 Allergies as of 3/7/2017  Review Complete On: 3/7/2017 By: Can Hernández DO No Known Allergies Current Immunizations  Reviewed on 8/29/2016 Name Date Influenza Vaccine 10/2/2014 Influenza Vaccine Kelleen Eladio) 8/29/2016 Influenza Vaccine (Quad) PF 9/30/2015 TDAP Vaccine 3/14/2012 Not reviewed this visit You Were Diagnosed With   
  
 Codes Comments Essential hypertension    -  Primary ICD-10-CM: I10 
ICD-9-CM: 401.9 Dyslipidemia     ICD-10-CM: E78.5 ICD-9-CM: 272.4 Vitamin D deficiency     ICD-10-CM: E55.9 ICD-9-CM: 268.9 Gastroesophageal reflux disease without esophagitis     ICD-10-CM: K21.9 ICD-9-CM: 530.81 Constipation, chronic     ICD-10-CM: K59.09 
ICD-9-CM: 564.00 Acute blood loss anemia     ICD-10-CM: D62 
ICD-9-CM: 285.1 Vitals BP Pulse Temp Resp Height(growth percentile) SpO2  
 104/68 (BP 1 Location: Left arm, BP Patient Position: Sitting) 96 97.8 °F (36.6 °C) (Oral) 16 4' 10\" (1.473 m) 98% Smoking Status Never Smoker Vitals History Preferred Pharmacy Pharmacy Name Phone [facility] [address] [address] [facility] 2/3 [phone] Your Updated Medication List  
  
   
This list is accurate as of: 3/7/17 11:15 AM.  Always use your most recent med list.  
  
  
  
  
 MALATHI PROTECT topical cream  
Generic drug:  dimethicone-zinc oxide APPLY TO BUTTOCKS AS NEEDED  
  
 bisacodyl 5 mg EC tablet Commonly known as:  DULCOLAX TAKE 2 TABLETS BY MOUTH ON MON WED FRI FOR LAXATIVE  
  
 carvedilol 3.125 mg tablet Commonly known as:  Madeleine Garo Take 1 Tab by mouth two (2) times daily (with meals). chlorpheniramine-HYDROcodone 10-8 mg/5 mL suspension Commonly known as:  Serena Favorite Take 5 mL by mouth every twelve (12) hours as needed for Cough. Max Daily Amount: 10 mL. cyanocobalamin 2,500 mcg sublingual tablet Commonly known as:  VITAMIN B12 Take 1 Tab by mouth daily. docosanol 10 % topical cream  
Commonly known as:  Larna Dimes Apply  to affected area five (5) times daily. enalapril 10 mg tablet Commonly known as:  [place] Harps Take 1 Tab by mouth two (2) times a day. ergocalciferol 50,000 unit capsule Commonly known as:  ERGOCALCIFEROL Take 1 Cap by mouth every seven (7) days. hydroCHLOROthiazide 25 mg tablet Commonly known as:  HYDRODIURIL Take 1 Tab by mouth daily. Iron NejSka-X-M01-Ca-Suc-Stoma tablet Commonly known as:  Omar Kelp Take 1 Tab by mouth two (2) times a day. metoclopramide HCl 10 mg tablet Commonly known as:  REGLAN Take 1 Tab by mouth Before breakfast, lunch, dinner and at bedtime for 10 days. morphine CR 15 mg CR tablet Commonly known as:  MS CONTIN Take 1 Tab by mouth two (2) times daily as needed. Max Daily Amount: 30 mg.  
  
 multivit with min-FA-lycopene 400-300 mcg Tab Commonly known as:  ONE-A-DAY MEN'S MULTIVITAMIN Take 1 Each by mouth daily. neomycin-bacitracnZn-polymyxnB 3.5-400-5,000 mg-unit-unit Oipk ointment Commonly known as:  TRIPLE ANTIBIOTIC Apply 1 Packet to affected area two (2) times a day. * OTHER May use applesauce for oral medication administration. * OTHER Listerine to use with oral swabs for mouth care twice a day. oxyCODONE IR 5 mg immediate release tablet Commonly known as:  Julio C Rocks Take 1-2 Tabs by mouth every four (4) hours as needed for Pain. Max Daily Amount: 60 mg. pantoprazole 40 mg tablet Commonly known as:  PROTONIX  
TAKE 1 TABLET BY MOUTH TWICE DAILY FOR GERD potassium chloride 10 mEq tablet Commonly known as:  K-DUR, KLOR-CON  
TAKE 1 TABLET BY MOUTH ONCE DAILY FOR LOW BLOOD LEVELS (MAY CRUSH ANDTAKE WITH APPLESAUCE)  
  
 raNITIdine 150 mg tablet Commonly known as:  ZANTAC Take 1 Tab by mouth two (2) times a day. senna-docusate 8.6-50 mg per tablet Commonly known as:  Matilde Holms Take 1 Tab by mouth two (2) times a day. simvastatin 20 mg tablet Commonly known as:  ZOCOR Take 1 Tab by mouth nightly. sorbitol 70 % solution TAKE 30ML BY MOUTH TWICE DAILY FOR LAXATIVE  
  
 sucralfate 1 gram tablet Commonly known as:  CARAFATE  
TAKE 1 TABLET BY MOUTH FOUR TIMES DAILY (AFTER MEALS & AT BEDTIME) FOR GERD Swab Swab Commonly known as:  TOOTHETTE Sig: Use as needed * Notice: This list has 2 medication(s) that are the same as other medications prescribed for you. Read the directions carefully, and ask your doctor or other care provider to review them with you. Follow-up Instructions Return in about 2 days (around 3/9/2017) for to review labs. Patient Instructions DASH Diet: Care Instructions Your Care Instructions The DASH diet is an eating plan that can help lower your blood pressure. DASH stands for Dietary Approaches to Stop Hypertension. Hypertension is high blood pressure. The DASH diet focuses on eating foods that are high in calcium, potassium, and magnesium. These nutrients can lower blood pressure. The foods that are highest in these nutrients are fruits, vegetables, low-fat dairy products, nuts, seeds, and legumes. But taking calcium, potassium, and magnesium supplements instead of eating foods that are high in those nutrients does not have the same effect. The DASH diet also includes whole grains, fish, and poultry. The DASH diet is one of several lifestyle changes your doctor may recommend to lower your high blood pressure. Your doctor may also want you to decrease the amount of sodium in your diet. Lowering sodium while following the DASH diet can lower blood pressure even further than just the DASH diet alone. Follow-up care is a key part of your treatment and safety. Be sure to make and go to all appointments, and call your doctor if you are having problems. It's also a good idea to know your test results and keep a list of the medicines you take. How can you care for yourself at home? Following the DASH diet · Eat 4 to 5 servings of fruit each day. A serving is 1 medium-sized piece of fruit, ½ cup chopped or canned fruit, 1/4 cup dried fruit, or 4 ounces (½ cup) of fruit juice. Choose fruit more often than fruit juice. · Eat 4 to 5 servings of vegetables each day. A serving is 1 cup of lettuce or raw leafy vegetables, ½ cup of chopped or cooked vegetables, or 4 ounces (½ cup) of vegetable juice. Choose vegetables more often than vegetable juice. · Get 2 to 3 servings of low-fat and fat-free dairy each day. A serving is 8 ounces of milk, 1 cup of yogurt, or 1 ½ ounces of cheese. · Eat 6 to 8 servings of grains each day. A serving is 1 slice of bread, 1 ounce of dry cereal, or ½ cup of cooked rice, pasta, or cooked cereal. Try to choose whole-grain products as much as possible. · Limit lean meat, poultry, and fish to 2 servings each day. A serving is 3 ounces, about the size of a deck of cards. · Eat 4 to 5 servings of nuts, seeds, and legumes (cooked dried beans, lentils, and split peas) each week. A serving is 1/3 cup of nuts, 2 tablespoons of seeds, or ½ cup of cooked beans or peas. · Limit fats and oils to 2 to 3 servings each day. A serving is 1 teaspoon of vegetable oil or 2 tablespoons of salad dressing. · Limit sweets and added sugars to 5 servings or less a week.  A serving is 1 tablespoon jelly or jam, ½ cup sorbet, or 1 cup of lemonade. · Eat less than 2,300 milligrams (mg) of sodium a day. If you limit your sodium to 1,500 mg a day, you can lower your blood pressure even more. Tips for success · Start small. Do not try to make dramatic changes to your diet all at once. You might feel that you are missing out on your favorite foods and then be more likely to not follow the plan. Make small changes, and stick with them. Once those changes become habit, add a few more changes. · Try some of the following: ¨ Make it a goal to eat a fruit or vegetable at every meal and at snacks. This will make it easy to get the recommended amount of fruits and vegetables each day. ¨ Try yogurt topped with fruit and nuts for a snack or healthy dessert. ¨ Add lettuce, tomato, cucumber, and onion to sandwiches. ¨ Combine a ready-made pizza crust with low-fat mozzarella cheese and lots of vegetable toppings. Try using tomatoes, squash, spinach, broccoli, carrots, cauliflower, and onions. ¨ Have a variety of cut-up vegetables with a low-fat dip as an appetizer instead of chips and dip. ¨ Sprinkle sunflower seeds or chopped almonds over salads. Or try adding chopped walnuts or almonds to cooked vegetables. ¨ Try some vegetarian meals using beans and peas. Add garbanzo or kidney beans to salads. Make burritos and tacos with mashed vásquez beans or black beans. Where can you learn more? Go to http://haven-magdi.info/. Enter C372 in the search box to learn more about \"DASH Diet: Care Instructions. \" Current as of: March 23, 2016 Content Version: 11.1 © 6542-3647 [employer]. Care instructions adapted under license by [employer] (which disclaims liability or warranty for this information).  If you have questions about a medical condition or this instruction, always ask your healthcare professional. [employer] disclaims any warranty or liability for your use of this information. Anemia: Care Instructions Your Care Instructions Anemia is a low level of red blood cells, which carry oxygen throughout your body. Many things can cause anemia. Lack of iron is one of the most common causes. Your body needs iron to make hemoglobin, a substance in red blood cells that carries oxygen from the lungs to your body's cells. Without enough iron, the body produces fewer and smaller red blood cells. As a result, your body's cells do not get enough oxygen, and you feel tired and weak. And you may have trouble concentrating. Bleeding is the most common cause of a lack of iron. You may have heavy menstrual bleeding or bleeding caused by conditions such as ulcers, hemorrhoids, or cancer. Regular use of aspirin or other anti-inflammatory medicines (such as ibuprofen) also can cause bleeding in some people. A lack of iron in your diet also can cause anemia, especially at times when the body needs more iron, such as during pregnancy, infancy, and the teen years. Your doctor may have prescribed iron pills. It may take several months of treatment for your iron levels to return to normal. Your doctor also may suggest that you eat foods that are rich in iron, such as meat and beans. There are many other causes of anemia. It is not always due to a lack of iron. Finding the specific cause of your anemia will help your doctor find the right treatment for you. Follow-up care is a key part of your treatment and safety. Be sure to make and go to all appointments, and call your doctor if you are having problems. It's also a good idea to know your test results and keep a list of the medicines you take. How can you care for yourself at home? · Take your medicines exactly as prescribed. Call your doctor if you think you are having a problem with your medicine. · If your doctor recommends iron pills, take them as directed: ¨ Try to take the pills on an empty stomach about 1 hour before or 2 hours after meals. But you may need to take iron with food to avoid an upset stomach. ¨ Do not take antacids or drink milk or caffeine drinks (such as coffee, tea, or cola) at the same time or within 2 hours of the time that you take your iron. They can make it hard for your body to absorb the iron. ¨ Vitamin C (from food or supplements) helps your body absorb iron. Try taking iron pills with a glass of orange juice or some other food that is high in vitamin C, such as citrus fruits. ¨ Iron pills may cause stomach problems, such as heartburn, nausea, diarrhea, constipation, and cramps. Be sure to drink plenty of fluids, and include fruits, vegetables, and fiber in your diet each day. Iron pills often make your bowel movements dark or green. ¨ If you forget to take an iron pill, do not take a double dose of iron the next time you take a pill. ¨ Keep iron pills out of the reach of small children. An overdose of iron can be very dangerous. · Follow your doctor's advice about eating iron-rich foods. These include red meat, shellfish, poultry, eggs, beans, raisins, whole-grain bread, and leafy green vegetables. · Steam vegetables to help them keep their iron content. When should you call for help? Call 911 anytime you think you may need emergency care. For example, call if: 
· You have symptoms of a heart attack. These may include: ¨ Chest pain or pressure, or a strange feeling in the chest. 
¨ Sweating. ¨ Shortness of breath. ¨ Nausea or vomiting. ¨ Pain, pressure, or a strange feeling in the back, neck, jaw, or upper belly or in one or both shoulders or arms. ¨ Lightheadedness or sudden weakness. ¨ A fast or irregular heartbeat. After you call 911, the  may tell you to chew 1 adult-strength or 2 to 4 low-dose aspirin. Wait for an ambulance. Do not try to drive yourself. · You passed out (lost consciousness). Call your doctor now or seek immediate medical care if: 
· You have new or increased shortness of breath. · You are dizzy or lightheaded, or you feel like you may faint. · Your fatigue and weakness continue or get worse. · You have any abnormal bleeding, such as: 
¨ Nosebleeds. ¨ Vaginal bleeding that is different (heavier, more frequent, at a different time of the month) than what you are used to. ¨ Bloody or black stools, or rectal bleeding. ¨ Bloody or pink urine. Watch closely for changes in your health, and be sure to contact your doctor if: 
· You do not get better as expected. Where can you learn more? Go to http://haven-magdi.info/. Enter R301 in the search box to learn more about \"Anemia: Care Instructions. \" Current as of: February 5, 2016 Content Version: 11.1 © 1348-3378 [employer]. Care instructions adapted under license by [employer] (which disclaims liability or warranty for this information). If you have questions about a medical condition or this instruction, always ask your healthcare professional. [address] any warranty or liability for your use of this information. Introducing [facility]! Yumiko Rivera introduces [employer] patient portal. Now you can access parts of your medical record, email your doctor's office, and request medication refills online. 1. In your internet browser, go to [email or web address] 2. Click on the First Time User? Click Here link in the Sign In box. You will see the New Member Sign Up page. 3. Enter your [employer] Access Code exactly as it appears below. You will not need to use this code after youve completed the sign-up process. If you do not sign up before the expiration date, you must request a new code. · [employer] Access Code: [email or web address] Expires: 5/29/2017 10:00 AM 
 
4.  Enter the last four digits of your Social Security Number (xxxx) and Date of Birth (mm/dd/yyyy) as indicated and click Submit. You will be taken to the next sign-up page. 5. Create a [facility] ID. This will be your [employer] login ID and cannot be changed, so think of one that is secure and easy to remember. 6. Create a [employer] password. You can change your password at any time. 7. Enter your Password Reset Question and Answer. This can be used at a later time if you forget your password. 8. Enter your e-mail address. You will receive e-mail notification when new information is available in [address] 9. Click Sign Up. You can now view and download portions of your medical record. 10. Click the Download Summary menu link to download a portable copy of your medical information. If you have questions, please visit the Frequently Asked Questions section of the [employer] website. Remember, [employer] is NOT to be used for urgent needs. For medical emergencies, dial 911. Now available from your iPhone and Android! Please provide this summary of care documentation to your next provider. Your primary care clinician is listed as Juan M De La Cruz. If you have any questions after today's visit, please call [phone].

## 2017-03-08 ENCOUNTER — DOCUMENTATION ONLY (OUTPATIENT)
Dept: INTERNAL MEDICINE CLINIC | Age: 62
End: 2017-03-08

## 2017-03-08 NOTE — PROGRESS NOTES
Per fax from [employer] [facility]. Prior auth for tussionex denied. Fax indicates \"med not covered under benefit. ..since this drug is being used for symptomatic relief of cough and cold\"  Fax initialed by physician and sent to central scanning.

## 2017-03-09 ENCOUNTER — OFFICE VISIT (OUTPATIENT)
Dept: INTERNAL MEDICINE CLINIC | Age: 62
End: 2017-03-09

## 2017-03-09 VITALS
HEART RATE: 79 BPM | RESPIRATION RATE: 16 BRPM | DIASTOLIC BLOOD PRESSURE: 68 MMHG | SYSTOLIC BLOOD PRESSURE: 110 MMHG | HEIGHT: 60 IN | OXYGEN SATURATION: 90 % | TEMPERATURE: 97.5 F

## 2017-03-09 DIAGNOSIS — D50.9 IRON DEFICIENCY ANEMIA, UNSPECIFIED IRON DEFICIENCY ANEMIA TYPE: ICD-10-CM

## 2017-03-09 DIAGNOSIS — D62 ACUTE BLOOD LOSS ANEMIA: Primary | ICD-10-CM

## 2017-03-09 PROBLEM — D64.9 ANEMIA: Status: ACTIVE | Noted: 2017-03-09

## 2017-03-09 NOTE — PROGRESS NOTES
Chief Complaint   Patient presents with    Hypertension    Cough    Anemia         HPI:  Patient is a 57year old  male with medical problems listed below presents today to review repeat H/H ordered at his recent visit two days to evaluate acute blood loss anemia. Repeat H/H done on 3/7/17 remains low at 6.4/24. 1. He was accompanied by his  Ms. Vanna Christianson who stated that he has been feeling well and asymptomatic as she denies rectal bleeding, hematemesis, fatigue, CP, SOB, or other complaints. No history could be obtained directly from the patient as he is severely mentally challenged. Group home staff was advised to take pt immediately to [facility] emergency department and I called and spoke with ED physician Dr. Mykel Dupont and advised admission to the hospital for transfusion and GI consultation to scope patient and elucidate source of blood loss. Past Medical History:   Diagnosis Date    Constipation, chronic     Dyslipidemia 4/8/2014    Femur fracture ([address] 7/7/16    brace at right femur    GERD (gastroesophageal reflux disease)     Hypertension     MR (mental retardation), severe     Muscle weakness of lower extremity     with spasticity    Urinary incontinence due to cognitive impairment 8/23/2012       No Known Allergies      Current Outpatient Prescriptions   Medication Sig Dispense Refill    chlorpheniramine-HYDROcodone (TUSSIONEX) 10-8 mg/5 mL suspension Take 5 mL by mouth every twelve (12) hours as needed for Cough. Max Daily Amount: 10 mL. 240 mL 0    metoclopramide HCl (REGLAN) 10 mg tablet Take 1 Tab by mouth Before breakfast, lunch, dinner and at bedtime for 10 days.  40 Tab 0    MALATHI PROTECT topical cream APPLY TO BUTTOCKS AS NEEDED 142 g 0    potassium chloride (K-DUR, KLOR-CON) 10 mEq tablet TAKE 1 TABLET BY MOUTH ONCE DAILY FOR LOW BLOOD LEVELS (MAY CRUSH ANDTAKE WITH APPLESAUCE) 30 Tab 0    senna-docusate (PERICOLACE) 8.6-50 mg per tablet Take 1 Tab by mouth two (2) times a day. 180 Tab 1    carvedilol (COREG) 3.125 mg tablet Take 1 Tab by mouth two (2) times daily (with meals). 180 Tab 1    enalapril (VASOTEC) 10 mg tablet Take 1 Tab by mouth two (2) times a day. 180 Tab 3    ranitidine (ZANTAC) 150 mg tablet Take 1 Tab by mouth two (2) times a day. 180 Tab 2    sucralfate (CARAFATE) 1 gram tablet TAKE 1 TABLET BY MOUTH FOUR TIMES DAILY (AFTER MEALS & AT BEDTIME) FOR GERD 120 Tab 3    ergocalciferol (ERGOCALCIFEROL) 50,000 unit capsule Take 1 Cap by mouth every seven (7) days. 4 Cap 3    simvastatin (ZOCOR) 20 mg tablet Take 1 Tab by mouth nightly. 90 Tab 3    multivit with min-FA-lycopene (ONE-A-DAY MEN'S MULTIVITAMIN) 400-300 mcg tab Take 1 Each by mouth daily. 90 Each 3    Swab (TOOTHETTE) swab Sig: Use as needed 600 Each 3    hydrochlorothiazide (HYDRODIURIL) 25 mg tablet Take 1 Tab by mouth daily. 30 Tab 5    sorbitol 70 % solution TAKE 30ML BY MOUTH TWICE DAILY FOR LAXATIVE 473 mL PRN    OTHER May use applesauce for oral medication administration. 1 Each 0    OTHER Listerine to use with oral swabs for mouth care twice a day. 1 Bottle 11    cyanocobalamin (VITAMIN B12) 2,500 mcg sublingual tablet Take 1 Tab by mouth daily. 30 Tab 6    pantoprazole (PROTONIX) 40 mg tablet TAKE 1 TABLET BY MOUTH TWICE DAILY FOR GERD 60 Tab 3    bisacodyl (DULCOLAX) 5 mg EC tablet TAKE 2 TABLETS BY MOUTH ON MON WED FRI FOR LAXATIVE 24 Tab 2    morphine CR (MS CONTIN) 15 mg CR tablet Take 1 Tab by mouth two (2) times daily as needed. Max Daily Amount: 30 mg. 10 Tab 0    neomycin-bacitracnZn-polymyxnB (TRIPLE ANTIBIOTIC) 3.5-400-5,000 mg-unit-unit oipk ointment Apply 1 Packet to affected area two (2) times a day. 1 Packet 0    oxyCODONE IR (ROXICODONE) 5 mg immediate release tablet Take 1-2 Tabs by mouth every four (4) hours as needed for Pain.  Max Daily Amount: 60 mg. 60 Tab 0    Iron VofCrg-P-F24-Ca-Suc-Stoma (MULTIGEN) tablet Take 1 Tab by mouth two (2) times a day. 60 Tab 3    docosanol (ABREVA) 10 % topical cream Apply  to affected area five (5) times daily. 2 g 0          ROS:  Unable to review as pt mentally challenged        Physical Exam:  Visit Vitals    /68    Pulse 79    Temp 97.5 °F (36.4 °C) (Oral)    Resp 16    Ht 4' 10\" (1.473 m)    SpO2 90%     General: Black male in wheelchair, sleeping, afebrile, in no acute distress  Respiratory: symmetrical chest expansion, lung sounds clear bilaterally, good air entry, good respiratory effort, no wheezes or crackles  Cardiovascular: normal S1S2, regular rate and rhythm, no murmurs, pulses palpable, no thrill, no carotid or abdominal bruits, no JVD  Abdomen: soft, non-distended, normoactive bowel sounds x 4 quadrants. Extremity: No edema noted      Assessment/Plan:    ICD-10-CM ICD-9-CM    1. Acute blood loss anemia D62 285.1 Recent H/H checked 3/7/17 was low at 6.4/24. 1. Staff was advised to take pt immediately to [facility] emergency department and I called and spoke with ED physician Dr. Saurabh Tapia and advised admission to the hospital for transfusion and GI consultation to scope patient and elucidate source of blood loss. 2. Iron deficiency anemia, unspecified iron deficiency anemia type D50.9 280.9 He will require further anemia w/u and iron transfusion when admitted. Recent labs reviewed today      Group home paperwork filled out      I called and spoke with [facility] ED physician Dr. Saurabh Tapia and advised admission of pt to the hospital for transfusion and GI consultation to scope patient and elucidate source of blood loss. Additional Notes: Discussed today's diagnosis, treatment plans. Discussed medication indications and side effects. After Visit Summary: Discussed provided printed patient instructions. Answered questions accordingly. Follow-up Disposition:  In 2 weeks      Leif Downey DO, MPH  Internal Medicine        Total time spent for this visit was 25 minutes with greater than 50% of the time spent involved in counseling and coordination of care as outlined above.

## 2017-03-09 NOTE — PROGRESS NOTES
Patient is in the office today for 2 day follow up. Patient does not take oxycodone, morphine, does not use the neomycin triple antibiotic, does not use the abreva or take the multigen. Do you have an Advance Directive no  Do you want more information no    1. Have you been to the ER, urgent care clinic since your last visit? Hospitalized since your last visit? No    2. Have you seen or consulted any other health care providers outside of the [address] [place] [address] since your last visit? Include any pap smears or colon screening.  No

## 2017-03-09 NOTE — PATIENT INSTRUCTIONS
DASH Diet: Care Instructions  Your Care Instructions  The DASH diet is an eating plan that can help lower your blood pressure. DASH stands for Dietary Approaches to Stop Hypertension. Hypertension is high blood pressure. The DASH diet focuses on eating foods that are high in calcium, potassium, and magnesium. These nutrients can lower blood pressure. The foods that are highest in these nutrients are fruits, vegetables, low-fat dairy products, nuts, seeds, and legumes. But taking calcium, potassium, and magnesium supplements instead of eating foods that are high in those nutrients does not have the same effect. The DASH diet also includes whole grains, fish, and poultry. The DASH diet is one of several lifestyle changes your doctor may recommend to lower your high blood pressure. Your doctor may also want you to decrease the amount of sodium in your diet. Lowering sodium while following the DASH diet can lower blood pressure even further than just the DASH diet alone. Follow-up care is a key part of your treatment and safety. Be sure to make and go to all appointments, and call your doctor if you are having problems. It's also a good idea to know your test results and keep a list of the medicines you take. How can you care for yourself at home? Following the DASH diet  · Eat 4 to 5 servings of fruit each day. A serving is 1 medium-sized piece of fruit, ½ cup chopped or canned fruit, 1/4 cup dried fruit, or 4 ounces (½ cup) of fruit juice. Choose fruit more often than fruit juice. · Eat 4 to 5 servings of vegetables each day. A serving is 1 cup of lettuce or raw leafy vegetables, ½ cup of chopped or cooked vegetables, or 4 ounces (½ cup) of vegetable juice. Choose vegetables more often than vegetable juice. · Get 2 to 3 servings of low-fat and fat-free dairy each day. A serving is 8 ounces of milk, 1 cup of yogurt, or 1 ½ ounces of cheese. · Eat 6 to 8 servings of grains each day.  A serving is 1 slice of bread, 1 ounce of dry cereal, or ½ cup of cooked rice, pasta, or cooked cereal. Try to choose whole-grain products as much as possible. · Limit lean meat, poultry, and fish to 2 servings each day. A serving is 3 ounces, about the size of a deck of cards. · Eat 4 to 5 servings of nuts, seeds, and legumes (cooked dried beans, lentils, and split peas) each week. A serving is 1/3 cup of nuts, 2 tablespoons of seeds, or ½ cup of cooked beans or peas. · Limit fats and oils to 2 to 3 servings each day. A serving is 1 teaspoon of vegetable oil or 2 tablespoons of salad dressing. · Limit sweets and added sugars to 5 servings or less a week. A serving is 1 tablespoon jelly or jam, ½ cup sorbet, or 1 cup of lemonade. · Eat less than 2,300 milligrams (mg) of sodium a day. If you limit your sodium to 1,500 mg a day, you can lower your blood pressure even more. Tips for success  · Start small. Do not try to make dramatic changes to your diet all at once. You might feel that you are missing out on your favorite foods and then be more likely to not follow the plan. Make small changes, and stick with them. Once those changes become habit, add a few more changes. · Try some of the following:  ¨ Make it a goal to eat a fruit or vegetable at every meal and at snacks. This will make it easy to get the recommended amount of fruits and vegetables each day. ¨ Try yogurt topped with fruit and nuts for a snack or healthy dessert. ¨ Add lettuce, tomato, cucumber, and onion to sandwiches. ¨ Combine a ready-made pizza crust with low-fat mozzarella cheese and lots of vegetable toppings. Try using tomatoes, squash, spinach, broccoli, carrots, cauliflower, and onions. ¨ Have a variety of cut-up vegetables with a low-fat dip as an appetizer instead of chips and dip. ¨ Sprinkle sunflower seeds or chopped almonds over salads. Or try adding chopped walnuts or almonds to cooked vegetables. ¨ Try some vegetarian meals using beans and peas. Add garbanzo or kidney beans to salads. Make burritos and tacos with mashed vásquez beans or black beans. Where can you learn more? Go to http://haven-magdi.info/. Enter G277 in the search box to learn more about \"DASH Diet: Care Instructions. \"  Current as of: March 23, 2016  Content Version: 11.1  © 9706-3688 [employer]. Care instructions adapted under license by [employer] (which disclaims liability or warranty for this information). If you have questions about a medical condition or this instruction, always ask your healthcare professional. [place] [address] any warranty or liability for your use of this information. Iron Deficiency Anemia: Care Instructions  Your Care Instructions    Anemia means that you do not have enough red blood cells. Red blood cells carry oxygen around your body. When you have anemia, it can make you pale, weak, and tired. Many things can cause anemia. The most common cause is loss of blood. This can happen if you have heavy menstrual periods. It can also happen if you have bleeding in your stomach or bowel. You can also get anemia if you don't have enough iron in your diet or if it's hard for your body to absorb iron. In some cases, pregnancy causes anemia. That's because a pregnant woman needs more iron. Your doctor may do more tests to find the cause of your anemia. If a disease or other health problem is causing it, your doctor will treat that problem. It's important to follow up with your doctor to make sure that your iron level returns to normal.  Follow-up care is a key part of your treatment and safety. Be sure to make and go to all appointments, and call your doctor if you are having problems. It's also a good idea to know your test results and keep a list of the medicines you take. How can you care for yourself at home? · If your doctor recommended iron pills, take them as directed.   ¨ Try to take the pills on an empty stomach. You can do this about 1 hour before or 2 hours after meals. But you may need to take iron with food to avoid an upset stomach. ¨ Do not take antacids or drink milk or anything with caffeine within 2 hours of when you take your iron. They can keep your body from absorbing the iron well. ¨ Vitamin C helps your body absorb iron. You may want to take iron pills with a glass of orange juice or some other food high in vitamin C.  ¨ Iron pills may cause stomach problems. These include heartburn, nausea, diarrhea, constipation, and cramps. It can help to drink plenty of fluids and include fruits, vegetables, and fiber in your diet. ¨ It's normal for iron pills to make your stool a greenish or grayish black. But internal bleeding can also cause dark stool. So it's important to tell your doctor about any color changes. ¨ Call your doctor if you think you are having a problem with your iron pills. Even after you start to feel better, it will take several months for your body to build up its supply of iron. ¨ If you miss a pill, don't take a double dose. ¨ Keep iron pills out of the reach of small children. Too much iron can be very dangerous. · Eat foods with a lot of iron. These include red meat, shellfish, poultry, and eggs. They also include beans, raisins, whole-grain bread, and leafy green vegetables. · Steam your vegetables. This is the best way to prepare them if you want to get as much iron as possible. · Be safe with medicines. Do not take nonsteroidal anti-inflammatory pain relievers unless your doctor tells you to. These include aspirin, naproxen (Aleve), and ibuprofen (Advil, Motrin). · Liquid iron can stain your teeth. But you can mix it with water or juice and drink it with a straw. Then it won't get on your teeth. When should you call for help? Call 911 anytime you think you may need emergency care. For example, call if:  · You passed out (lost consciousness).   · You vomit blood or what looks like coffee grounds. · You pass maroon or very bloody stools. Call your doctor now or seek immediate medical care if:  · Your stools are black and look like tar, or they have streaks of blood. · You are dizzy or lightheaded, or you feel like you may faint. Watch closely for changes in your health, and be sure to contact your doctor if:  · Your fatigue and weakness continue or get worse. · You have side effects from taking iron pills, such as nausea, vomiting, constipation, diarrhea, or heartburn. · You do not get better as expected. Where can you learn more? Go to http://haven-magdi.info/. Enter A895 in the search box to learn more about \"Iron Deficiency Anemia: Care Instructions. \"  Current as of: February 5, 2016  Content Version: 11.1  © 5339-2876 [employer]. Care instructions adapted under license by [employer] (which disclaims liability or warranty for this information). If you have questions about a medical condition or this instruction, always ask your healthcare professional. [address] any warranty or liability for your use of this information.

## 2017-03-09 NOTE — MR AVS SNAPSHOT
Visit Information Date & Time Provider Department Dept. Phone Encounter #  
 3/9/2017 11:15 AM Leif Downey,  Internists at [facility] [phone] Follow-up Instructions Return in about 2 weeks (around 3/23/2017) for f/u Anemia. Upcoming Health Maintenance Date Due ZOSTER VACCINE AGE 60> 8/13/2014 DTaP/Tdap/Td series (2 - Td) 3/14/2022 Allergies as of 3/9/2017  Review Complete On: 3/9/2017 By: 138 Dawson Downey, DO No Known Allergies Current Immunizations  Reviewed on 8/29/2016 Name Date Influenza Vaccine 10/2/2014 Influenza Vaccine [employer]) 8/29/2016 Influenza Vaccine (Quad) PF 9/30/2015 TDAP Vaccine 3/14/2012 Not reviewed this visit You Were Diagnosed With   
  
 Codes Comments Acute blood loss anemia    -  Primary ICD-10-CM: D62 
ICD-9-CM: 285.1 Iron deficiency anemia, unspecified iron deficiency anemia type     ICD-10-CM: D50.9 ICD-9-CM: 280.9 Vitals BP Pulse Temp Resp Height(growth percentile) SpO2  
 110/68 79 97.5 °F (36.4 °C) (Oral) 16 4' 10\" (1.473 m) 90% Smoking Status Never Smoker Vitals History Preferred Pharmacy Pharmacy Name Phone [facility] [address] 2/3 [phone] Your Updated Medication List  
  
   
This list is accurate as of: 3/9/17 11:50 AM.  Always use your most recent med list.  
  
  
  
  
 MALATHI PROTECT topical cream  
Generic drug:  dimethicone-zinc oxide APPLY TO BUTTOCKS AS NEEDED  
  
 bisacodyl 5 mg EC tablet Commonly known as:  DULCOLAX TAKE 2 TABLETS BY MOUTH ON MON WED FRI FOR LAXATIVE  
  
 carvedilol 3.125 mg tablet Commonly known as:  Yumiko Dome Take 1 Tab by mouth two (2) times daily (with meals). chlorpheniramine-HYDROcodone 10-8 mg/5 mL suspension Commonly known as:  Yobany Matthew Take 5 mL by mouth every twelve (12) hours as needed for Cough.  Max Daily Amount: 10 mL. cyanocobalamin 2,500 mcg sublingual tablet Commonly known as:  VITAMIN B12 Take 1 Tab by mouth daily. docosanol 10 % topical cream  
Commonly known as:  Johnnie Ros Apply  to affected area five (5) times daily. enalapril 10 mg tablet Commonly known as:  Santiago Reva Take 1 Tab by mouth two (2) times a day. ergocalciferol 50,000 unit capsule Commonly known as:  ERGOCALCIFEROL Take 1 Cap by mouth every seven (7) days. hydroCHLOROthiazide 25 mg tablet Commonly known as:  HYDRODIURIL Take 1 Tab by mouth daily. Iron BzbFei-W-T42-Ca-Suc-Stoma tablet Commonly known as:  Chuy Hodgkins Take 1 Tab by mouth two (2) times a day. metoclopramide HCl 10 mg tablet Commonly known as:  REGLAN Take 1 Tab by mouth Before breakfast, lunch, dinner and at bedtime for 10 days. morphine CR 15 mg CR tablet Commonly known as:  MS CONTIN Take 1 Tab by mouth two (2) times daily as needed. Max Daily Amount: 30 mg.  
  
 multivit with min-FA-lycopene 400-300 mcg Tab Commonly known as:  ONE-A-DAY MEN'S MULTIVITAMIN Take 1 Each by mouth daily. neomycin-bacitracnZn-polymyxnB 3.5-400-5,000 mg-unit-unit [place] ointment Commonly known as:  TRIPLE ANTIBIOTIC Apply 1 Packet to affected area two (2) times a day. * OTHER May use applesauce for oral medication administration. * OTHER Listerine to use with oral swabs for mouth care twice a day. oxyCODONE IR 5 mg immediate release tablet Commonly known as:  Tk Scott Take 1-2 Tabs by mouth every four (4) hours as needed for Pain. Max Daily Amount: 60 mg.  
  
 pantoprazole 40 mg tablet Commonly known as:  PROTONIX  
TAKE 1 TABLET BY MOUTH TWICE DAILY FOR GERD potassium chloride 10 mEq tablet Commonly known as:  K-DUR, KLOR-CON  
TAKE 1 TABLET BY MOUTH ONCE DAILY FOR LOW BLOOD LEVELS (MAY CRUSH ANDTAKE WITH APPLESAUCE)  
  
 raNITIdine 150 mg tablet Commonly known as:  ZANTAC Take 1 Tab by mouth two (2) times a day. senna-docusate 8.6-50 mg per tablet Commonly known as:  Nayely Lipschutz Take 1 Tab by mouth two (2) times a day. simvastatin 20 mg tablet Commonly known as:  ZOCOR Take 1 Tab by mouth nightly. sorbitol 70 % solution TAKE 30ML BY MOUTH TWICE DAILY FOR LAXATIVE  
  
 sucralfate 1 gram tablet Commonly known as:  CARAFATE  
TAKE 1 TABLET BY MOUTH FOUR TIMES DAILY (AFTER MEALS & AT BEDTIME) FOR GERD Swab Swab Commonly known as:  TOOTHETTE Sig: Use as needed * Notice: This list has 2 medication(s) that are the same as other medications prescribed for you. Read the directions carefully, and ask your doctor or other care provider to review them with you. Follow-up Instructions Return in about 2 weeks (around 3/23/2017) for f/u Anemia. Patient Instructions DASH Diet: Care Instructions Your Care Instructions The DASH diet is an eating plan that can help lower your blood pressure. DASH stands for Dietary Approaches to Stop Hypertension. Hypertension is high blood pressure. The DASH diet focuses on eating foods that are high in calcium, potassium, and magnesium. These nutrients can lower blood pressure. The foods that are highest in these nutrients are fruits, vegetables, low-fat dairy products, nuts, seeds, and legumes. But taking calcium, potassium, and magnesium supplements instead of eating foods that are high in those nutrients does not have the same effect. The DASH diet also includes whole grains, fish, and poultry. The DASH diet is one of several lifestyle changes your doctor may recommend to lower your high blood pressure. Your doctor may also want you to decrease the amount of sodium in your diet. Lowering sodium while following the DASH diet can lower blood pressure even further than just the DASH diet alone. Follow-up care is a key part of your treatment and safety.  Be sure to make and go to all appointments, and call your doctor if you are having problems. It's also a good idea to know your test results and keep a list of the medicines you take. How can you care for yourself at home? Following the DASH diet · Eat 4 to 5 servings of fruit each day. A serving is 1 medium-sized piece of fruit, ½ cup chopped or canned fruit, 1/4 cup dried fruit, or 4 ounces (½ cup) of fruit juice. Choose fruit more often than fruit juice. · Eat 4 to 5 servings of vegetables each day. A serving is 1 cup of lettuce or raw leafy vegetables, ½ cup of chopped or cooked vegetables, or 4 ounces (½ cup) of vegetable juice. Choose vegetables more often than vegetable juice. · Get 2 to 3 servings of low-fat and fat-free dairy each day. A serving is 8 ounces of milk, 1 cup of yogurt, or 1 ½ ounces of cheese. · Eat 6 to 8 servings of grains each day. A serving is 1 slice of bread, 1 ounce of dry cereal, or ½ cup of cooked rice, pasta, or cooked cereal. Try to choose whole-grain products as much as possible. · Limit lean meat, poultry, and fish to 2 servings each day. A serving is 3 ounces, about the size of a deck of cards. · Eat 4 to 5 servings of nuts, seeds, and legumes (cooked dried beans, lentils, and split peas) each week. A serving is 1/3 cup of nuts, 2 tablespoons of seeds, or ½ cup of cooked beans or peas. · Limit fats and oils to 2 to 3 servings each day. A serving is 1 teaspoon of vegetable oil or 2 tablespoons of salad dressing. · Limit sweets and added sugars to 5 servings or less a week. A serving is 1 tablespoon jelly or jam, ½ cup sorbet, or 1 cup of lemonade. · Eat less than 2,300 milligrams (mg) of sodium a day. If you limit your sodium to 1,500 mg a day, you can lower your blood pressure even more. Tips for success · Start small. Do not try to make dramatic changes to your diet all at once.  You might feel that you are missing out on your favorite foods and then be more likely to not follow the plan. Make small changes, and stick with them. Once those changes become habit, add a few more changes. · Try some of the following: ¨ Make it a goal to eat a fruit or vegetable at every meal and at snacks. This will make it easy to get the recommended amount of fruits and vegetables each day. ¨ Try yogurt topped with fruit and nuts for a snack or healthy dessert. ¨ Add lettuce, tomato, cucumber, and onion to sandwiches. ¨ Combine a ready-made pizza crust with low-fat mozzarella cheese and lots of vegetable toppings. Try using tomatoes, squash, spinach, broccoli, carrots, cauliflower, and onions. ¨ Have a variety of cut-up vegetables with a low-fat dip as an appetizer instead of chips and dip. ¨ Sprinkle sunflower seeds or chopped almonds over salads. Or try adding chopped walnuts or almonds to cooked vegetables. ¨ Try some vegetarian meals using beans and peas. Add garbanzo or kidney beans to salads. Make burritos and tacos with mashed vásquez beans or black beans. Where can you learn more? Go to http://haven-magdi.info/. Enter R322 in the search box to learn more about \"DASH Diet: Care Instructions. \" Current as of: March 23, 2016 Content Version: 11.1 © 8880-6425 [employer]. Care instructions adapted under license by [employer] (which disclaims liability or warranty for this information). If you have questions about a medical condition or this instruction, always ask your healthcare professional. [place] [address] any warranty or liability for your use of this information. Iron Deficiency Anemia: Care Instructions Your Care Instructions Anemia means that you do not have enough red blood cells. Red blood cells carry oxygen around your body. When you have anemia, it can make you pale, weak, and tired. Many things can cause anemia. The most common cause is loss of blood.  This can happen if you have heavy menstrual periods. It can also happen if you have bleeding in your stomach or bowel. You can also get anemia if you don't have enough iron in your diet or if it's hard for your body to absorb iron. In some cases, pregnancy causes anemia. That's because a pregnant woman needs more iron. Your doctor may do more tests to find the cause of your anemia. If a disease or other health problem is causing it, your doctor will treat that problem. It's important to follow up with your doctor to make sure that your iron level returns to normal. 
Follow-up care is a key part of your treatment and safety. Be sure to make and go to all appointments, and call your doctor if you are having problems. It's also a good idea to know your test results and keep a list of the medicines you take. How can you care for yourself at home? · If your doctor recommended iron pills, take them as directed. ¨ Try to take the pills on an empty stomach. You can do this about 1 hour before or 2 hours after meals. But you may need to take iron with food to avoid an upset stomach. ¨ Do not take antacids or drink milk or anything with caffeine within 2 hours of when you take your iron. They can keep your body from absorbing the iron well. ¨ Vitamin C helps your body absorb iron. You may want to take iron pills with a glass of orange juice or some other food high in vitamin C. 
¨ Iron pills may cause stomach problems. These include heartburn, nausea, diarrhea, constipation, and cramps. It can help to drink plenty of fluids and include fruits, vegetables, and fiber in your diet. ¨ It's normal for iron pills to make your stool a greenish or grayish black. But internal bleeding can also cause dark stool. So it's important to tell your doctor about any color changes. ¨ Call your doctor if you think you are having a problem with your iron pills.  Even after you start to feel better, it will take several months for your body to build up its supply of iron. ¨ If you miss a pill, don't take a double dose. ¨ Keep iron pills out of the reach of small children. Too much iron can be very dangerous. · Eat foods with a lot of iron. These include red meat, shellfish, poultry, and eggs. They also include beans, raisins, whole-grain bread, and leafy green vegetables. · Steam your vegetables. This is the best way to prepare them if you want to get as much iron as possible. · Be safe with medicines. Do not take nonsteroidal anti-inflammatory pain relievers unless your doctor tells you to. These include aspirin, naproxen (Aleve), and ibuprofen (Advil, Motrin). · Liquid iron can stain your teeth. But you can mix it with water or juice and drink it with a straw. Then it won't get on your teeth. When should you call for help? Call 911 anytime you think you may need emergency care. For example, call if: 
· You passed out (lost consciousness). · You vomit blood or what looks like coffee grounds. · You pass maroon or very bloody stools. Call your doctor now or seek immediate medical care if: 
· Your stools are black and look like tar, or they have streaks of blood. · You are dizzy or lightheaded, or you feel like you may faint. Watch closely for changes in your health, and be sure to contact your doctor if: 
· Your fatigue and weakness continue or get worse. · You have side effects from taking iron pills, such as nausea, vomiting, constipation, diarrhea, or heartburn. · You do not get better as expected. Where can you learn more? Go to http://haven-magdi.info/. Enter Q638 in the search box to learn more about \"Iron Deficiency Anemia: Care Instructions. \" Current as of: February 5, 2016 Content Version: 11.1 © 7014-5947 [employer].  Care instructions adapted under license by [employer] (which disclaims liability or warranty for this information). If you have questions about a medical condition or this instruction, always ask your healthcare professional. [address] any warranty or liability for your use of this information. Introducing [facility] SERVICES! [employer] introduces WOMN patient portal. Now you can access parts of your medical record, email your doctor's office, and request medication refills online. 1. In your internet browser, go to [email or web address] 2. Click on the First Time User? Click Here link in the Sign In box. You will see the New Member Sign Up page. 3. Enter your WOMN Access Code exactly as it appears below. You will not need to use this code after youve completed the sign-up process. If you do not sign up before the expiration date, you must request a new code. · WOMN Access Code: [email or web address] Expires: 5/29/2017 10:00 AM 
 
4. Enter the last four digits of your Social Security Number (xxxx) and Date of Birth (mm/dd/yyyy) as indicated and click Submit. You will be taken to the next sign-up page. 5. Create a WOMN ID. This will be your WOMN login ID and cannot be changed, so think of one that is secure and easy to remember. 6. Create a WOMN password. You can change your password at any time. 7. Enter your Password Reset Question and Answer. This can be used at a later time if you forget your password. 8. Enter your e-mail address. You will receive e-mail notification when new information is available in [address]. 9. Click Sign Up. You can now view and download portions of your medical record. 10. Click the Download Summary menu link to download a portable copy of your medical information. If you have questions, please visit the Frequently Asked Questions section of the WOMN website. Remember, WOMN is NOT to be used for urgent needs. For medical emergencies, dial 911. Now available from your iPhone and Android! Please provide this summary of care documentation to your next provider. Your primary care clinician is listed as [address] If you have any questions after today's visit, please call [phone].

## 2017-03-14 ENCOUNTER — PATIENT OUTREACH (OUTPATIENT)
Dept: INTERNAL MEDICINE CLINIC | Age: 62
End: 2017-03-14

## 2017-03-14 NOTE — PROGRESS NOTES
Patient admitted to [facility] on 3/9/17-3/13/17 for Anemia. First  Attempt to contact patient via telephone on 3/14/17  for post hospital  follow up. Unable to reach. Left message on voicemail with office contact information.         Will call again

## 2017-03-15 ENCOUNTER — PATIENT OUTREACH (OUTPATIENT)
Dept: INTERNAL MEDICINE CLINIC | Age: 62
End: 2017-03-15

## 2017-03-15 NOTE — PROGRESS NOTES
This note is by Nurse Navigator that is supporting the office at this time. Patient admitted to [facility] on 3/9/17-3/13/17 for Anemia. Attempt to contact patient via telephone on 3/15/17 for post hospital follow up. Unable to reach. Left message on voicemail with office contact information. Will continue to follow.

## 2017-03-15 NOTE — PROGRESS NOTES
Patient admitted to [facility] on 3/9/17-3/13/17 for Anemia. Attempt to contact patient and/or caregiver via telephone on 3/15/17  for post hospital  follow up. AMIRAH Lanier at the group home where patient's resides. Jose Elias Padron asked writer to call back at 10:30 am .     Ms. Jose Elias Padron states that the patient is doing good. Office contact information given to AMIRAH Thompson. Will continue to follow.

## 2017-03-16 ENCOUNTER — TELEPHONE (OUTPATIENT)
Dept: INTERNAL MEDICINE CLINIC | Age: 62
End: 2017-03-16

## 2017-03-16 NOTE — TELEPHONE ENCOUNTER
Hollie with [facility] request 2 orders    1. Order to give medications with pudding or applesauce    2.  Order to discontinue the medication Abreva, (pt does not have any cold sores)    Fax [phone]

## 2017-03-16 NOTE — TELEPHONE ENCOUNTER
Script written stating that it is okay to give medications with Pudding or Apple sauce and okay to discontinue Abreva as cold sore is resolved. Please fax script accordingly.

## 2017-03-16 NOTE — TELEPHONE ENCOUNTER
Hamilton Antunez, legal guardian with [address] request Dr Jaime Nettles opinion    Pt was recently inpatient at [facility] and it was recommended by a Dr Joenathan Meckel ([facility]ative care) that pt should have DNR on file

## 2017-03-17 NOTE — TELEPHONE ENCOUNTER
Sonia Weiss, legal guardian is aware per Dr. Burger Pale DNR is reasonable. Raymundo Kennedy states she will bring the form to the office to have provider sign.

## 2017-03-22 ENCOUNTER — DOCUMENTATION ONLY (OUTPATIENT)
Dept: INTERNAL MEDICINE CLINIC | Age: 62
End: 2017-03-22

## 2017-03-23 ENCOUNTER — OFFICE VISIT (OUTPATIENT)
Dept: INTERNAL MEDICINE CLINIC | Age: 62
End: 2017-03-23

## 2017-03-23 VITALS
TEMPERATURE: 97.7 F | SYSTOLIC BLOOD PRESSURE: 112 MMHG | RESPIRATION RATE: 16 BRPM | OXYGEN SATURATION: 98 % | HEIGHT: 60 IN | DIASTOLIC BLOOD PRESSURE: 60 MMHG | HEART RATE: 67 BPM

## 2017-03-23 DIAGNOSIS — D62 ACUTE BLOOD LOSS ANEMIA: ICD-10-CM

## 2017-03-23 DIAGNOSIS — F72 MR (MENTAL RETARDATION), SEVERE: ICD-10-CM

## 2017-03-23 DIAGNOSIS — Z09 HOSPITAL DISCHARGE FOLLOW-UP: Primary | ICD-10-CM

## 2017-03-23 DIAGNOSIS — E55.9 VITAMIN D DEFICIENCY: ICD-10-CM

## 2017-03-23 DIAGNOSIS — I10 ESSENTIAL HYPERTENSION: ICD-10-CM

## 2017-03-23 DIAGNOSIS — D50.9 IRON DEFICIENCY ANEMIA, UNSPECIFIED IRON DEFICIENCY ANEMIA TYPE: ICD-10-CM

## 2017-03-23 NOTE — PATIENT INSTRUCTIONS
Anemia: Care Instructions  Your Care Instructions    Anemia is a low level of red blood cells, which carry oxygen throughout your body. Many things can cause anemia. Lack of iron is one of the most common causes. Your body needs iron to make hemoglobin, a substance in red blood cells that carries oxygen from the lungs to your body's cells. Without enough iron, the body produces fewer and smaller red blood cells. As a result, your body's cells do not get enough oxygen, and you feel tired and weak. And you may have trouble concentrating. Bleeding is the most common cause of a lack of iron. You may have heavy menstrual bleeding or bleeding caused by conditions such as ulcers, hemorrhoids, or cancer. Regular use of aspirin or other anti-inflammatory medicines (such as ibuprofen) also can cause bleeding in some people. A lack of iron in your diet also can cause anemia, especially at times when the body needs more iron, such as during pregnancy, infancy, and the teen years. Your doctor may have prescribed iron pills. It may take several months of treatment for your iron levels to return to normal. Your doctor also may suggest that you eat foods that are rich in iron, such as meat and beans. There are many other causes of anemia. It is not always due to a lack of iron. Finding the specific cause of your anemia will help your doctor find the right treatment for you. Follow-up care is a key part of your treatment and safety. Be sure to make and go to all appointments, and call your doctor if you are having problems. It's also a good idea to know your test results and keep a list of the medicines you take. How can you care for yourself at home? · Take your medicines exactly as prescribed. Call your doctor if you think you are having a problem with your medicine. · If your doctor recommends iron pills, take them as directed:  ¨ Try to take the pills on an empty stomach about 1 hour before or 2 hours after meals. But you may need to take iron with food to avoid an upset stomach. ¨ Do not take antacids or drink milk or caffeine drinks (such as coffee, tea, or cola) at the same time or within 2 hours of the time that you take your iron. They can make it hard for your body to absorb the iron. ¨ Vitamin C (from food or supplements) helps your body absorb iron. Try taking iron pills with a glass of orange juice or some other food that is high in vitamin C, such as citrus fruits. ¨ Iron pills may cause stomach problems, such as heartburn, nausea, diarrhea, constipation, and cramps. Be sure to drink plenty of fluids, and include fruits, vegetables, and fiber in your diet each day. Iron pills often make your bowel movements dark or green. ¨ If you forget to take an iron pill, do not take a double dose of iron the next time you take a pill. ¨ Keep iron pills out of the reach of small children. An overdose of iron can be very dangerous. · Follow your doctor's advice about eating iron-rich foods. These include red meat, shellfish, poultry, eggs, beans, raisins, whole-grain bread, and leafy green vegetables. · Steam vegetables to help them keep their iron content. When should you call for help? Call 911 anytime you think you may need emergency care. For example, call if:  · You have symptoms of a heart attack. These may include:  ¨ Chest pain or pressure, or a strange feeling in the chest.  ¨ Sweating. ¨ Shortness of breath. ¨ Nausea or vomiting. ¨ Pain, pressure, or a strange feeling in the back, neck, jaw, or upper belly or in one or both shoulders or arms. ¨ Lightheadedness or sudden weakness. ¨ A fast or irregular heartbeat. After you call 911, the  may tell you to chew 1 adult-strength or 2 to 4 low-dose aspirin. Wait for an ambulance. Do not try to drive yourself. · You passed out (lost consciousness).   Call your doctor now or seek immediate medical care if:  · You have new or increased shortness of breath. · You are dizzy or lightheaded, or you feel like you may faint. · Your fatigue and weakness continue or get worse. · You have any abnormal bleeding, such as:  ¨ Nosebleeds. ¨ Vaginal bleeding that is different (heavier, more frequent, at a different time of the month) than what you are used to. ¨ Bloody or black stools, or rectal bleeding. ¨ Bloody or pink urine. Watch closely for changes in your health, and be sure to contact your doctor if:  · You do not get better as expected. Where can you learn more? Go to http://haven-magdi.info/. Enter R301 in the search box to learn more about \"Anemia: Care Instructions. \"  Current as of: February 5, 2016  Content Version: 11.1  © 1412-8469 [employer]. Care instructions adapted under license by [employer] (which disclaims liability or warranty for this information). If you have questions about a medical condition or this instruction, always ask your healthcare professional. [place] [address] any warranty or liability for your use of this information. Anemia: Care Instructions  Your Care Instructions    Anemia is a low level of red blood cells, which carry oxygen throughout your body. Many things can cause anemia. Lack of iron is one of the most common causes. Your body needs iron to make hemoglobin, a substance in red blood cells that carries oxygen from the lungs to your body's cells. Without enough iron, the body produces fewer and smaller red blood cells. As a result, your body's cells do not get enough oxygen, and you feel tired and weak. And you may have trouble concentrating. Bleeding is the most common cause of a lack of iron. You may have heavy menstrual bleeding or bleeding caused by conditions such as ulcers, hemorrhoids, or cancer. Regular use of aspirin or other anti-inflammatory medicines (such as ibuprofen) also can cause bleeding in some people.  A lack of iron in your diet also can cause anemia, especially at times when the body needs more iron, such as during pregnancy, infancy, and the teen years. Your doctor may have prescribed iron pills. It may take several months of treatment for your iron levels to return to normal. Your doctor also may suggest that you eat foods that are rich in iron, such as meat and beans. There are many other causes of anemia. It is not always due to a lack of iron. Finding the specific cause of your anemia will help your doctor find the right treatment for you. Follow-up care is a key part of your treatment and safety. Be sure to make and go to all appointments, and call your doctor if you are having problems. It's also a good idea to know your test results and keep a list of the medicines you take. How can you care for yourself at home? · Take your medicines exactly as prescribed. Call your doctor if you think you are having a problem with your medicine. · If your doctor recommends iron pills, take them as directed:  ¨ Try to take the pills on an empty stomach about 1 hour before or 2 hours after meals. But you may need to take iron with food to avoid an upset stomach. ¨ Do not take antacids or drink milk or caffeine drinks (such as coffee, tea, or cola) at the same time or within 2 hours of the time that you take your iron. They can make it hard for your body to absorb the iron. ¨ Vitamin C (from food or supplements) helps your body absorb iron. Try taking iron pills with a glass of orange juice or some other food that is high in vitamin C, such as citrus fruits. ¨ Iron pills may cause stomach problems, such as heartburn, nausea, diarrhea, constipation, and cramps. Be sure to drink plenty of fluids, and include fruits, vegetables, and fiber in your diet each day. Iron pills often make your bowel movements dark or green. ¨ If you forget to take an iron pill, do not take a double dose of iron the next time you take a pill.   ¨ Keep iron pills out of the reach of small children. An overdose of iron can be very dangerous. · Follow your doctor's advice about eating iron-rich foods. These include red meat, shellfish, poultry, eggs, beans, raisins, whole-grain bread, and leafy green vegetables. · Steam vegetables to help them keep their iron content. When should you call for help? Call 911 anytime you think you may need emergency care. For example, call if:  · You have symptoms of a heart attack. These may include:  ¨ Chest pain or pressure, or a strange feeling in the chest.  ¨ Sweating. ¨ Shortness of breath. ¨ Nausea or vomiting. ¨ Pain, pressure, or a strange feeling in the back, neck, jaw, or upper belly or in one or both shoulders or arms. ¨ Lightheadedness or sudden weakness. ¨ A fast or irregular heartbeat. After you call 911, the  may tell you to chew 1 adult-strength or 2 to 4 low-dose aspirin. Wait for an ambulance. Do not try to drive yourself. · You passed out (lost consciousness). Call your doctor now or seek immediate medical care if:  · You have new or increased shortness of breath. · You are dizzy or lightheaded, or you feel like you may faint. · Your fatigue and weakness continue or get worse. · You have any abnormal bleeding, such as:  ¨ Nosebleeds. ¨ Vaginal bleeding that is different (heavier, more frequent, at a different time of the month) than what you are used to. ¨ Bloody or black stools, or rectal bleeding. ¨ Bloody or pink urine. Watch closely for changes in your health, and be sure to contact your doctor if:  · You do not get better as expected. Where can you learn more? Go to http://haven-magdi.info/. Enter R301 in the search box to learn more about \"Anemia: Care Instructions. \"  Current as of: February 5, 2016  Content Version: 11.1  © 8333-0380 [employer].  Care instructions adapted under license by [employer] (which disclaims liability or warranty for this information). If you have questions about a medical condition or this instruction, always ask your healthcare professional. [address] any warranty or liability for your use of this information. Iron Deficiency Anemia: Care Instructions  Your Care Instructions    Anemia means that you do not have enough red blood cells. Red blood cells carry oxygen around your body. When you have anemia, it can make you pale, weak, and tired. Many things can cause anemia. The most common cause is loss of blood. This can happen if you have heavy menstrual periods. It can also happen if you have bleeding in your stomach or bowel. You can also get anemia if you don't have enough iron in your diet or if it's hard for your body to absorb iron. In some cases, pregnancy causes anemia. That's because a pregnant woman needs more iron. Your doctor may do more tests to find the cause of your anemia. If a disease or other health problem is causing it, your doctor will treat that problem. It's important to follow up with your doctor to make sure that your iron level returns to normal.  Follow-up care is a key part of your treatment and safety. Be sure to make and go to all appointments, and call your doctor if you are having problems. It's also a good idea to know your test results and keep a list of the medicines you take. How can you care for yourself at home? · If your doctor recommended iron pills, take them as directed. ¨ Try to take the pills on an empty stomach. You can do this about 1 hour before or 2 hours after meals. But you may need to take iron with food to avoid an upset stomach. ¨ Do not take antacids or drink milk or anything with caffeine within 2 hours of when you take your iron. They can keep your body from absorbing the iron well. ¨ Vitamin C helps your body absorb iron.  You may want to take iron pills with a glass of orange juice or some other food high in vitamin C.  ¨ Iron pills may cause stomach problems. These include heartburn, nausea, diarrhea, constipation, and cramps. It can help to drink plenty of fluids and include fruits, vegetables, and fiber in your diet. ¨ It's normal for iron pills to make your stool a greenish or grayish black. But internal bleeding can also cause dark stool. So it's important to tell your doctor about any color changes. ¨ Call your doctor if you think you are having a problem with your iron pills. Even after you start to feel better, it will take several months for your body to build up its supply of iron. ¨ If you miss a pill, don't take a double dose. ¨ Keep iron pills out of the reach of small children. Too much iron can be very dangerous. · Eat foods with a lot of iron. These include red meat, shellfish, poultry, and eggs. They also include beans, raisins, whole-grain bread, and leafy green vegetables. · Steam your vegetables. This is the best way to prepare them if you want to get as much iron as possible. · Be safe with medicines. Do not take nonsteroidal anti-inflammatory pain relievers unless your doctor tells you to. These include aspirin, naproxen (Aleve), and ibuprofen (Advil, Motrin). · Liquid iron can stain your teeth. But you can mix it with water or juice and drink it with a straw. Then it won't get on your teeth. When should you call for help? Call 911 anytime you think you may need emergency care. For example, call if:  · You passed out (lost consciousness). · You vomit blood or what looks like coffee grounds. · You pass maroon or very bloody stools. Call your doctor now or seek immediate medical care if:  · Your stools are black and look like tar, or they have streaks of blood. · You are dizzy or lightheaded, or you feel like you may faint. Watch closely for changes in your health, and be sure to contact your doctor if:  · Your fatigue and weakness continue or get worse.   · You have side effects from taking iron pills, such as nausea, vomiting, constipation, diarrhea, or heartburn. · You do not get better as expected. Where can you learn more? Go to http://haven-magdi.info/. Enter D276 in the search box to learn more about \"Iron Deficiency Anemia: Care Instructions. \"  Current as of: February 5, 2016  Content Version: 11.1  © 8357-3343 [employer]. Care instructions adapted under license by [employer] (which disclaims liability or warranty for this information). If you have questions about a medical condition or this instruction, always ask your healthcare professional. [place] [address] any warranty or liability for your use of this information.

## 2017-03-23 NOTE — PROGRESS NOTES
Patient is in the office today for 2 week follow up. Do you have an Advance Directive no  Do you want more information no    1. Have you been to the ER, urgent care clinic since your last visit? Hospitalized since your last visit? Yes Replaced by [facility]    2. Have you seen or consulted any other health care providers outside of the [address] [place] [address] since your last visit? Include any pap smears or colon screening.  No

## 2017-03-23 NOTE — MR AVS SNAPSHOT
Visit Information Date & Time Provider Department Dept. Phone Encounter #  
 3/23/2017  9:15 AM Alberto Crandall DO [facility] [phone] Follow-up Instructions Return in about 4 days (around 3/27/2017), or to f/u Anemia, for Labs 1 week before. Upcoming Health Maintenance Date Due ZOSTER VACCINE AGE 60> 8/13/2014 DTaP/Tdap/Td series (2 - Td) 3/14/2022 Allergies as of 3/23/2017  Review Complete On: 3/23/2017 By: Brendon Smoke  
 No Known Allergies Current Immunizations  Reviewed on 8/29/2016 Name Date Influenza Vaccine 10/2/2014 Influenza Vaccine Donnita Saltness) 8/29/2016 Influenza Vaccine (Quad) PF 9/30/2015 TDAP Vaccine 3/14/2012 Not reviewed this visit You Were Diagnosed With   
  
 Codes Comments Acute blood loss anemia    -  Primary ICD-10-CM: D62 
ICD-9-CM: 285.1 Essential hypertension     ICD-10-CM: I10 
ICD-9-CM: 401.9 MR (mental retardation), severe     ICD-10-CM: F72 
ICD-9-CM: 318.1 Vitamin D deficiency     ICD-10-CM: E55.9 ICD-9-CM: 268.9 Iron deficiency anemia, unspecified iron deficiency anemia type     ICD-10-CM: D50.9 ICD-9-CM: 280.9 Vitals BP Pulse Temp Resp Height(growth percentile) SpO2  
 112/60 (BP 1 Location: Left arm, BP Patient Position: Sitting) 67 97.7 °F (36.5 °C) (Axillary) 16 4' 10\" (1.473 m) 98% Smoking Status Never Smoker Vitals History Preferred Pharmacy Pharmacy Name Phone [facility] [address] [facility] 2/3 [phone] Your Updated Medication List  
  
   
This list is accurate as of: 3/23/17  9:57 AM.  Always use your most recent med list.  
  
  
  
  
 * carvedilol 3.125 mg tablet Commonly known as:  Melvina Del Real Take 1 Tab by mouth two (2) times daily (with meals). * carvedilol 3.125 mg tablet Commonly known as:  Melvina Del Real  
 Take 1 Tab by mouth two (2) times daily (with meals). * cyanocobalamin 2,500 mcg sublingual tablet Commonly known as:  VITAMIN B12 Take 1 Tab by mouth daily. * cyanocobalamin 2,500 mcg sublingual tablet Commonly known as:  VITAMIN B12 Take 1 Tab by mouth daily. docosanol 10 % topical cream  
Commonly known as:  Avelino Hefty Thin film * enalapril 10 mg tablet Commonly known as:  Franceclyde Big Take 1 Tab by mouth two (2) times a day. * enalapril 10 mg tablet Commonly known as:  Francenia Big Take 1 Tab by mouth two (2) times a day. * ergocalciferol 50,000 unit capsule Commonly known as:  ERGOCALCIFEROL Take 1 Cap by mouth every seven (7) days. * ergocalciferol 50,000 unit capsule Commonly known as:  ERGOCALCIFEROL Take 1 Cap by mouth every seven (7) days. ferrous sulfate 300 mg (60 mg iron)/5 mL syrup Take 5 mL by mouth two (2) times a day for 30 days. hydroCHLOROthiazide 25 mg tablet Commonly known as:  HYDRODIURIL Take 1 Tab by mouth daily. multivit with min-FA-lycopene 400-300 mcg Tab Commonly known as:  ONE-A-DAY MEN'S MULTIVITAMIN Take 1 Each by mouth daily. potassium chloride 10 mEq tablet Commonly known as:  K-DUR, KLOR-CON Take 2 Tabs by mouth daily. raNITIdine 150 mg tablet Commonly known as:  ZANTAC Take 1 Tab by mouth two (2) times a day. senna-docusate 8.6-50 mg per tablet Commonly known as:  Juanda Indu Take 1 Tab by mouth two (2) times a day. simvastatin 20 mg tablet Commonly known as:  ZOCOR Take 1 Tab by mouth nightly. sorbitol 70 % solution TAKE 30ML BY MOUTH TWICE DAILY FOR LAXATIVE  
  
 sucralfate 1 gram tablet Commonly known as:  CARAFATE  
TAKE 1 TABLET BY MOUTH FOUR TIMES DAILY (AFTER MEALS & AT BEDTIME) FOR GERD Swab Swab Commonly known as:  TOOTHETTE Sig: Use as needed  
  
 therapeutic multivitamin-minerals tablet Commonly known as:  THERAGRAN-M  
 Take 1 Tab by mouth daily. * Notice: This list has 8 medication(s) that are the same as other medications prescribed for you. Read the directions carefully, and ask your doctor or other care provider to review them with you. Follow-up Instructions Return in about 4 days (around 3/27/2017), or to f/u Anemia, for Labs 1 week before. To-Do List   
 04/21/2017 Lab:  CBC WITH AUTOMATED DIFF Patient Instructions Anemia: Care Instructions Your Care Instructions Anemia is a low level of red blood cells, which carry oxygen throughout your body. Many things can cause anemia. Lack of iron is one of the most common causes. Your body needs iron to make hemoglobin, a substance in red blood cells that carries oxygen from the lungs to your body's cells. Without enough iron, the body produces fewer and smaller red blood cells. As a result, your body's cells do not get enough oxygen, and you feel tired and weak. And you may have trouble concentrating. Bleeding is the most common cause of a lack of iron. You may have heavy menstrual bleeding or bleeding caused by conditions such as ulcers, hemorrhoids, or cancer. Regular use of aspirin or other anti-inflammatory medicines (such as ibuprofen) also can cause bleeding in some people. A lack of iron in your diet also can cause anemia, especially at times when the body needs more iron, such as during pregnancy, infancy, and the teen years. Your doctor may have prescribed iron pills. It may take several months of treatment for your iron levels to return to normal. Your doctor also may suggest that you eat foods that are rich in iron, such as meat and beans. There are many other causes of anemia. It is not always due to a lack of iron. Finding the specific cause of your anemia will help your doctor find the right treatment for you. Follow-up care is a key part of your treatment and safety.  Be sure to make and go to all appointments, and call your doctor if you are having problems. It's also a good idea to know your test results and keep a list of the medicines you take. How can you care for yourself at home? · Take your medicines exactly as prescribed. Call your doctor if you think you are having a problem with your medicine. · If your doctor recommends iron pills, take them as directed: ¨ Try to take the pills on an empty stomach about 1 hour before or 2 hours after meals. But you may need to take iron with food to avoid an upset stomach. ¨ Do not take antacids or drink milk or caffeine drinks (such as coffee, tea, or cola) at the same time or within 2 hours of the time that you take your iron. They can make it hard for your body to absorb the iron. ¨ Vitamin C (from food or supplements) helps your body absorb iron. Try taking iron pills with a glass of orange juice or some other food that is high in vitamin C, such as citrus fruits. ¨ Iron pills may cause stomach problems, such as heartburn, nausea, diarrhea, constipation, and cramps. Be sure to drink plenty of fluids, and include fruits, vegetables, and fiber in your diet each day. Iron pills often make your bowel movements dark or green. ¨ If you forget to take an iron pill, do not take a double dose of iron the next time you take a pill. ¨ Keep iron pills out of the reach of small children. An overdose of iron can be very dangerous. · Follow your doctor's advice about eating iron-rich foods. These include red meat, shellfish, poultry, eggs, beans, raisins, whole-grain bread, and leafy green vegetables. · Steam vegetables to help them keep their iron content. When should you call for help? Call 911 anytime you think you may need emergency care. For example, call if: 
· You have symptoms of a heart attack. These may include: ¨ Chest pain or pressure, or a strange feeling in the chest. 
¨ Sweating. ¨ Shortness of breath. ¨ Nausea or vomiting. ¨ Pain, pressure, or a strange feeling in the back, neck, jaw, or upper belly or in one or both shoulders or arms. ¨ Lightheadedness or sudden weakness. ¨ A fast or irregular heartbeat. After you call 911, the  may tell you to chew 1 adult-strength or 2 to 4 low-dose aspirin. Wait for an ambulance. Do not try to drive yourself. · You passed out (lost consciousness). Call your doctor now or seek immediate medical care if: 
· You have new or increased shortness of breath. · You are dizzy or lightheaded, or you feel like you may faint. · Your fatigue and weakness continue or get worse. · You have any abnormal bleeding, such as: 
¨ Nosebleeds. ¨ Vaginal bleeding that is different (heavier, more frequent, at a different time of the month) than what you are used to. ¨ Bloody or black stools, or rectal bleeding. ¨ Bloody or pink urine. Watch closely for changes in your health, and be sure to contact your doctor if: 
· You do not get better as expected. Where can you learn more? Go to http://havenPurePredictivemagdi.info/. Enter R301 in the search box to learn more about \"Anemia: Care Instructions. \" Current as of: February 5, 2016 Content Version: 11.1 © 6092-8203 [employer]. Care instructions adapted under license by [employer] (which disclaims liability or warranty for this information). If you have questions about a medical condition or this instruction, always ask your healthcare professional. [place] [address] any warranty or liability for your use of this information. Anemia: Care Instructions Your Care Instructions Anemia is a low level of red blood cells, which carry oxygen throughout your body. Many things can cause anemia. Lack of iron is one of the most common causes. Your body needs iron to make hemoglobin, a substance in red blood cells that carries oxygen from the lungs to your body's cells. Without enough iron, the body produces fewer and smaller red blood cells. As a result, your body's cells do not get enough oxygen, and you feel tired and weak. And you may have trouble concentrating. Bleeding is the most common cause of a lack of iron. You may have heavy menstrual bleeding or bleeding caused by conditions such as ulcers, hemorrhoids, or cancer. Regular use of aspirin or other anti-inflammatory medicines (such as ibuprofen) also can cause bleeding in some people. A lack of iron in your diet also can cause anemia, especially at times when the body needs more iron, such as during pregnancy, infancy, and the teen years. Your doctor may have prescribed iron pills. It may take several months of treatment for your iron levels to return to normal. Your doctor also may suggest that you eat foods that are rich in iron, such as meat and beans. There are many other causes of anemia. It is not always due to a lack of iron. Finding the specific cause of your anemia will help your doctor find the right treatment for you. Follow-up care is a key part of your treatment and safety. Be sure to make and go to all appointments, and call your doctor if you are having problems. It's also a good idea to know your test results and keep a list of the medicines you take. How can you care for yourself at home? · Take your medicines exactly as prescribed. Call your doctor if you think you are having a problem with your medicine. · If your doctor recommends iron pills, take them as directed: ¨ Try to take the pills on an empty stomach about 1 hour before or 2 hours after meals. But you may need to take iron with food to avoid an upset stomach. ¨ Do not take antacids or drink milk or caffeine drinks (such as coffee, tea, or cola) at the same time or within 2 hours of the time that you take your iron. They can make it hard for your body to absorb the iron. ¨ Vitamin C (from food or supplements) helps your body absorb iron. Try taking iron pills with a glass of orange juice or some other food that is high in vitamin C, such as citrus fruits. ¨ Iron pills may cause stomach problems, such as heartburn, nausea, diarrhea, constipation, and cramps. Be sure to drink plenty of fluids, and include fruits, vegetables, and fiber in your diet each day. Iron pills often make your bowel movements dark or green. ¨ If you forget to take an iron pill, do not take a double dose of iron the next time you take a pill. ¨ Keep iron pills out of the reach of small children. An overdose of iron can be very dangerous. · Follow your doctor's advice about eating iron-rich foods. These include red meat, shellfish, poultry, eggs, beans, raisins, whole-grain bread, and leafy green vegetables. · Steam vegetables to help them keep their iron content. When should you call for help? Call 911 anytime you think you may need emergency care. For example, call if: 
· You have symptoms of a heart attack. These may include: ¨ Chest pain or pressure, or a strange feeling in the chest. 
¨ Sweating. ¨ Shortness of breath. ¨ Nausea or vomiting. ¨ Pain, pressure, or a strange feeling in the back, neck, jaw, or upper belly or in one or both shoulders or arms. ¨ Lightheadedness or sudden weakness. ¨ A fast or irregular heartbeat. After you call 911, the  may tell you to chew 1 adult-strength or 2 to 4 low-dose aspirin. Wait for an ambulance. Do not try to drive yourself. · You passed out (lost consciousness). Call your doctor now or seek immediate medical care if: 
· You have new or increased shortness of breath. · You are dizzy or lightheaded, or you feel like you may faint. · Your fatigue and weakness continue or get worse. · You have any abnormal bleeding, such as: 
¨ Nosebleeds.  
¨ Vaginal bleeding that is different (heavier, more frequent, at a different time of the month) than what you are used to. ¨ Bloody or black stools, or rectal bleeding. ¨ Bloody or pink urine. Watch closely for changes in your health, and be sure to contact your doctor if: 
· You do not get better as expected. Where can you learn more? Go to http://haven-magdi.info/. Enter R301 in the search box to learn more about \"Anemia: Care Instructions. \" Current as of: February 5, 2016 Content Version: 11.1 © 0881-3161 [employer]. Care instructions adapted under license by [employer] (which disclaims liability or warranty for this information). If you have questions about a medical condition or this instruction, always ask your healthcare professional. [address] any warranty or liability for your use of this information. Iron Deficiency Anemia: Care Instructions Your Care Instructions Anemia means that you do not have enough red blood cells. Red blood cells carry oxygen around your body. When you have anemia, it can make you pale, weak, and tired. Many things can cause anemia. The most common cause is loss of blood. This can happen if you have heavy menstrual periods. It can also happen if you have bleeding in your stomach or bowel. You can also get anemia if you don't have enough iron in your diet or if it's hard for your body to absorb iron. In some cases, pregnancy causes anemia. That's because a pregnant woman needs more iron. Your doctor may do more tests to find the cause of your anemia. If a disease or other health problem is causing it, your doctor will treat that problem. It's important to follow up with your doctor to make sure that your iron level returns to normal. 
Follow-up care is a key part of your treatment and safety. Be sure to make and go to all appointments, and call your doctor if you are having problems.  It's also a good idea to know your test results and keep a list of the medicines you take. How can you care for yourself at home? · If your doctor recommended iron pills, take them as directed. ¨ Try to take the pills on an empty stomach. You can do this about 1 hour before or 2 hours after meals. But you may need to take iron with food to avoid an upset stomach. ¨ Do not take antacids or drink milk or anything with caffeine within 2 hours of when you take your iron. They can keep your body from absorbing the iron well. ¨ Vitamin C helps your body absorb iron. You may want to take iron pills with a glass of orange juice or some other food high in vitamin C. 
¨ Iron pills may cause stomach problems. These include heartburn, nausea, diarrhea, constipation, and cramps. It can help to drink plenty of fluids and include fruits, vegetables, and fiber in your diet. ¨ It's normal for iron pills to make your stool a greenish or grayish black. But internal bleeding can also cause dark stool. So it's important to tell your doctor about any color changes. ¨ Call your doctor if you think you are having a problem with your iron pills. Even after you start to feel better, it will take several months for your body to build up its supply of iron. ¨ If you miss a pill, don't take a double dose. ¨ Keep iron pills out of the reach of small children. Too much iron can be very dangerous. · Eat foods with a lot of iron. These include red meat, shellfish, poultry, and eggs. They also include beans, raisins, whole-grain bread, and leafy green vegetables. · Steam your vegetables. This is the best way to prepare them if you want to get as much iron as possible. · Be safe with medicines. Do not take nonsteroidal anti-inflammatory pain relievers unless your doctor tells you to. These include aspirin, naproxen (Aleve), and ibuprofen (Advil, Motrin). · Liquid iron can stain your teeth. But you can mix it with water or juice and drink it with a straw. Then it won't get on your teeth. When should you call for help? Call 911 anytime you think you may need emergency care. For example, call if: 
· You passed out (lost consciousness). · You vomit blood or what looks like coffee grounds. · You pass maroon or very bloody stools. Call your doctor now or seek immediate medical care if: 
· Your stools are black and look like tar, or they have streaks of blood. · You are dizzy or lightheaded, or you feel like you may faint. Watch closely for changes in your health, and be sure to contact your doctor if: 
· Your fatigue and weakness continue or get worse. · You have side effects from taking iron pills, such as nausea, vomiting, constipation, diarrhea, or heartburn. · You do not get better as expected. Where can you learn more? Go to http://haven-magdi.info/. Enter T536 in the search box to learn more about \"Iron Deficiency Anemia: Care Instructions. \" Current as of: February 5, 2016 Content Version: 11.1 © 2195-2862 Healthwise[employer] Incorporated. Care instructions adapted under license by [employer] (which disclaims liability or warranty for this information). If you have questions about a medical condition or this instruction, always ask your healthcare professional. [place] [address] any warranty or liability for your use of this information. Introducing [facility]! [employer] introduces The TechMap patient portal. Now you can access parts of your medical record, email your doctor's office, and request medication refills online. 1. In your internet browser, go to [email or web address]. [email or web address] 2. Click on the First Time User? Click Here link in the Sign In box. You will see the New Member Sign Up page. 3. Enter your The TechMap Access Code exactly as it appears below. You will not need to use this code after youve completed the sign-up process. If you do not sign up before the expiration date, you must request a new code. · [employer] Access Code: [email or web address] Expires: 5/29/2017 11:00 AM 
 
4. Enter the last four digits of your Social Security Number (xxxx) and Date of Birth (mm/dd/yyyy) as indicated and click Submit. You will be taken to the next sign-up page. 5. Create a [employer] ID. This will be your [employer] login ID and cannot be changed, so think of one that is secure and easy to remember. 6. Create a [employer] password. You can change your password at any time. 7. Enter your Password Reset Question and Answer. This can be used at a later time if you forget your password. 8. Enter your e-mail address. You will receive e-mail notification when new information is available in [address] 9. Click Sign Up. You can now view and download portions of your medical record. 10. Click the Download Summary menu link to download a portable copy of your medical information. If you have questions, please visit the Frequently Asked Questions section of the [employer] website. Remember, [employer] is NOT to be used for urgent needs. For medical emergencies, dial 911. Now available from your iPhone and Android! Please provide this summary of care documentation to your next provider. Your primary care clinician is listed as Suzzanne Fabry. If you have any questions after today's visit, please call [phone].

## 2017-03-24 ENCOUNTER — DOCUMENTATION ONLY (OUTPATIENT)
Dept: INTERNAL MEDICINE CLINIC | Age: 62
End: 2017-03-24

## 2017-03-24 ENCOUNTER — TELEPHONE (OUTPATIENT)
Dept: INTERNAL MEDICINE CLINIC | Age: 62
End: 2017-03-24

## 2017-03-24 NOTE — TELEPHONE ENCOUNTER
Carlos Tsai with [facility] called to check on the status of the form that was faxed over for incontinence supplies. Please fax completed form back.

## 2017-03-24 NOTE — PROGRESS NOTES
Received another tycon form requesting more supplies. Placed in yellow folder for physician signature.

## 2017-04-03 RX ORDER — FERROUS SULFATE 300 MG/5ML
LIQUID (ML) ORAL
Qty: 240 ML | Refills: 3 | Status: SHIPPED | OUTPATIENT
Start: 2017-04-03 | End: 2017-07-18 | Stop reason: SDUPTHER

## 2017-04-05 ENCOUNTER — PATIENT OUTREACH (OUTPATIENT)
Dept: INTERNAL MEDICINE CLINIC | Age: 62
End: 2017-04-05

## 2017-04-21 DIAGNOSIS — E87.6 HYPOKALEMIA: ICD-10-CM

## 2017-04-24 RX ORDER — CHOLECALCIFEROL (VITAMIN D3) 50 MCG
TABLET ORAL
Qty: 30 TAB | Refills: 0 | Status: SHIPPED | OUTPATIENT
Start: 2017-04-24 | End: 2017-08-29 | Stop reason: SDUPTHER

## 2017-04-24 RX ORDER — POTASSIUM CHLORIDE 750 MG/1
TABLET, EXTENDED RELEASE ORAL
Qty: 30 TAB | Refills: 0 | Status: SHIPPED | OUTPATIENT
Start: 2017-04-24 | End: 2017-06-15 | Stop reason: SDUPTHER

## 2017-04-24 NOTE — TELEPHONE ENCOUNTER
I called patient in regards to Rx refill on Potassium. Informed Patient that Rx was refilled and sent to pharmacy.

## 2017-05-16 ENCOUNTER — PATIENT OUTREACH (OUTPATIENT)
Dept: INTERNAL MEDICINE CLINIC | Age: 62
End: 2017-05-16

## 2017-05-16 NOTE — PROGRESS NOTES
This note is by Nurse Navigator that is supporting the office at this time. Patient admitted to [facility] on 3/9/17-3/13/17 for Anemia. Attempt to contact patient via telephone on 17 for post hospital follow up. Reached Ms. Brandon Leroy on phone and verified patient's identity using name and . Introduced self/role and purpose of call. Ms. Briceño stated \" He is doing good\". Noted next follow up Appt with Dr. Michael Hernadez on 17. Opportunity to ask questions was provided. Contact information was provided for future reference, concerns, assistance or further questions. No concerns, assistance, needs and questions at this time as per Ms. Brandon Leroy. Noted no hospitalization  admission post 30 days from discharge date 3/13/17. This episode is closed. Post Hospitalization Encounter Resolved.

## 2017-05-18 ENCOUNTER — OFFICE VISIT (OUTPATIENT)
Dept: INTERNAL MEDICINE CLINIC | Age: 62
End: 2017-05-18

## 2017-05-18 VITALS
OXYGEN SATURATION: 99 % | SYSTOLIC BLOOD PRESSURE: 116 MMHG | HEART RATE: 63 BPM | DIASTOLIC BLOOD PRESSURE: 72 MMHG | TEMPERATURE: 97.5 F | RESPIRATION RATE: 18 BRPM

## 2017-05-18 DIAGNOSIS — E78.5 DYSLIPIDEMIA: ICD-10-CM

## 2017-05-18 DIAGNOSIS — E55.9 VITAMIN D DEFICIENCY: ICD-10-CM

## 2017-05-18 DIAGNOSIS — F72 MR (MENTAL RETARDATION), SEVERE: Primary | ICD-10-CM

## 2017-05-18 DIAGNOSIS — D50.9 IRON DEFICIENCY ANEMIA, UNSPECIFIED IRON DEFICIENCY ANEMIA TYPE: ICD-10-CM

## 2017-05-18 DIAGNOSIS — I10 ESSENTIAL HYPERTENSION: ICD-10-CM

## 2017-05-18 RX ORDER — ACETAMINOPHEN 325 MG/1
325 TABLET ORAL
COMMUNITY
End: 2018-10-03

## 2017-05-18 NOTE — PATIENT INSTRUCTIONS
A Healthy Lifestyle: Care Instructions  Your Care Instructions  A healthy lifestyle can help you feel good, stay at a healthy weight, and have plenty of energy for both work and play. A healthy lifestyle is something you can share with your whole family. A healthy lifestyle also can lower your risk for serious health problems, such as high blood pressure, heart disease, and diabetes. You can follow a few steps listed below to improve your health and the health of your family. Follow-up care is a key part of your treatment and safety. Be sure to make and go to all appointments, and call your doctor if you are having problems. Its also a good idea to know your test results and keep a list of the medicines you take. How can you care for yourself at home? · Do not eat too much sugar, fat, or fast foods. You can still have dessert and treats now and then. The goal is moderation. · Start small to improve your eating habits. Pay attention to portion sizes, drink less juice and soda pop, and eat more fruits and vegetables. ¨ Eat a healthy amount of food. A 3-ounce serving of meat, for example, is about the size of a deck of cards. Fill the rest of your plate with vegetables and whole grains. ¨ Limit the amount of soda and sports drinks you have every day. Drink more water when you are thirsty. ¨ Eat at least 5 servings of fruits and vegetables every day. It may seem like a lot, but it is not hard to reach this goal. A serving or helping is 1 piece of fruit, 1 cup of vegetables, or 2 cups of leafy, raw vegetables. Have an apple or some carrot sticks as an afternoon snack instead of a candy bar. Try to have fruits and/or vegetables at every meal.  · Make exercise part of your daily routine. You may want to start with simple activities, such as walking, bicycling, or slow swimming. Try to be active 30 to 60 minutes every day. You do not need to do all 30 to 60 minutes all at once.  For example, you can exercise 3 times a day for 10 or 20 minutes. Moderate exercise is safe for most people, but it is always a good idea to talk to your doctor before starting an exercise program.  · Keep moving. [employer] the lawn, work in the garden, or That{img}. Take the stairs instead of the elevator at work. · If you smoke, quit. People who smoke have an increased risk for heart attack, stroke, cancer, and other lung illnesses. Quitting is hard, but there are ways to boost your chance of quitting tobacco for good. ¨ Use nicotine gum, patches, or lozenges. ¨ Ask your doctor about stop-smoking programs and medicines. ¨ Keep trying. In addition to reducing your risk of diseases in the future, you will notice some benefits soon after you stop using tobacco. If you have shortness of breath or asthma symptoms, they will likely get better within a few weeks after you quit. · Limit how much alcohol you drink. Moderate amounts of alcohol (up to 2 drinks a day for men, 1 drink a day for women) are okay. But drinking too much can lead to liver problems, high blood pressure, and other health problems. Family health  If you have a family, there are many things you can do together to improve your health. · Eat meals together as a family as often as possible. · Eat healthy foods. This includes fruits, vegetables, lean meats and dairy, and whole grains. · Include your family in your fitness plan. Most people think of activities such as jogging or tennis as the way to fitness, but there are many ways you and your family can be more active. Anything that makes you breathe hard and gets your heart pumping is exercise. Here are some tips:  ¨ Walk to do errands or to take your child to school or the bus. ¨ Go for a family bike ride after dinner instead of watching TV. Where can you learn more? Go to http://haven-magdi.info/. Enter Q891 in the search box to learn more about \"A Healthy Lifestyle: Care Instructions. \"  Current as of: July 26, 2016  Content Version: 11.2  © 3959-9239 [employer]. Care instructions adapted under license by [employer] (which disclaims liability or warranty for this information). If you have questions about a medical condition or this instruction, always ask your healthcare professional. [address] any warranty or liability for your use of this information. Iron Deficiency Anemia: Care Instructions  Your Care Instructions    Anemia means that you do not have enough red blood cells. Red blood cells carry oxygen around your body. When you have anemia, it can make you pale, weak, and tired. Many things can cause anemia. The most common cause is loss of blood. This can happen if you have heavy menstrual periods. It can also happen if you have bleeding in your stomach or bowel. You can also get anemia if you don't have enough iron in your diet or if it's hard for your body to absorb iron. In some cases, pregnancy causes anemia. That's because a pregnant woman needs more iron. Your doctor may do more tests to find the cause of your anemia. If a disease or other health problem is causing it, your doctor will treat that problem. It's important to follow up with your doctor to make sure that your iron level returns to normal.  Follow-up care is a key part of your treatment and safety. Be sure to make and go to all appointments, and call your doctor if you are having problems. It's also a good idea to know your test results and keep a list of the medicines you take. How can you care for yourself at home? · If your doctor recommended iron pills, take them as directed. ¨ Try to take the pills on an empty stomach. You can do this about 1 hour before or 2 hours after meals. But you may need to take iron with food to avoid an upset stomach. ¨ Do not take antacids or drink milk or anything with caffeine within 2 hours of when you take your iron.  They can keep your body from absorbing the iron well. ¨ Vitamin C helps your body absorb iron. You may want to take iron pills with a glass of orange juice or some other food high in vitamin C.  ¨ Iron pills may cause stomach problems. These include heartburn, nausea, diarrhea, constipation, and cramps. It can help to drink plenty of fluids and include fruits, vegetables, and fiber in your diet. ¨ It's normal for iron pills to make your stool a greenish or grayish black. But internal bleeding can also cause dark stool. So it's important to tell your doctor about any color changes. ¨ Call your doctor if you think you are having a problem with your iron pills. Even after you start to feel better, it will take several months for your body to build up its supply of iron. ¨ If you miss a pill, don't take a double dose. ¨ Keep iron pills out of the reach of small children. Too much iron can be very dangerous. · Eat foods with a lot of iron. These include red meat, shellfish, poultry, and eggs. They also include beans, raisins, whole-grain bread, and leafy green vegetables. · Steam your vegetables. This is the best way to prepare them if you want to get as much iron as possible. · Be safe with medicines. Do not take nonsteroidal anti-inflammatory pain relievers unless your doctor tells you to. These include aspirin, naproxen (Aleve), and ibuprofen (Advil, Motrin). · Liquid iron can stain your teeth. But you can mix it with water or juice and drink it with a straw. Then it won't get on your teeth. When should you call for help? Call 911 anytime you think you may need emergency care. For example, call if:  · You passed out (lost consciousness). · You vomit blood or what looks like coffee grounds. · You pass maroon or very bloody stools. Call your doctor now or seek immediate medical care if:  · Your stools are black and look like tar, or they have streaks of blood.   · You are dizzy or lightheaded, or you feel like you may faint. Watch closely for changes in your health, and be sure to contact your doctor if:  · Your fatigue and weakness continue or get worse. · You have side effects from taking iron pills, such as nausea, vomiting, constipation, diarrhea, or heartburn. · You do not get better as expected. Where can you learn more? Go to http://haven-magdi.info/. Enter W801 in the search box to learn more about \"Iron Deficiency Anemia: Care Instructions. \"  Current as of: October 13, 2016  Content Version: 11.2  © 0847-6687 [employer]. Care instructions adapted under license by [employer] (which disclaims liability or warranty for this information). If you have questions about a medical condition or this instruction, always ask your healthcare professional. [address] any warranty or liability for your use of this information.

## 2017-05-18 NOTE — PROGRESS NOTES
Chief Complaint   Patient presents with   [facility] F/U   Patient is here for Hospital F/U  1. Have you been to the ER, urgent care clinic since your last visit? Hospitalized since your last visit? Yes Reason for visit: 5/12/17 [facility]    2. Have you seen or consulted any other health care providers outside of the [address] [place] [address] since your last visit? Include any pap smears or colon screening.  No

## 2017-05-18 NOTE — MR AVS SNAPSHOT
Visit Information Date & Time Provider Department Dept. Phone Encounter #  
 5/18/2017 10:30 AM Star Presley DO [facility] [phone] Follow-up Instructions Return in about 3 months (around 8/18/2017) for Labs 1 week before. Upcoming Health Maintenance Date Due ZOSTER VACCINE AGE 60> 8/13/2014 INFLUENZA AGE 9 TO ADULT 8/1/2017 DTaP/Tdap/Td series (2 - Td) 3/14/2022 Allergies as of 5/18/2017  Review Complete On: 5/18/2017 By: Boo Aguero LPN No Known Allergies Current Immunizations  Reviewed on 8/29/2016 Name Date Influenza Vaccine 10/2/2014 Influenza Vaccine Krause [place]) 8/29/2016 Influenza Vaccine (Quad) PF 9/30/2015 TDAP Vaccine 3/14/2012 Not reviewed this visit You Were Diagnosed With   
  
 Codes Comments MR (mental retardation), severe    -  Primary ICD-10-CM: F72 
ICD-9-CM: 318.1 Essential hypertension     ICD-10-CM: I10 
ICD-9-CM: 401.9 Iron deficiency anemia, unspecified iron deficiency anemia type     ICD-10-CM: D50.9 ICD-9-CM: 280.9 Dyslipidemia     ICD-10-CM: E78.5 ICD-9-CM: 272.4 Vitamin D deficiency     ICD-10-CM: E55.9 ICD-9-CM: 268.9 Vitals BP Pulse Temp Resp SpO2 Smoking Status 116/72 (BP 1 Location: Left arm, BP Patient Position: Sitting) 63 97.5 °F (36.4 °C) (Axillary) 18 99% Never Smoker Preferred Pharmacy Pharmacy Name Phone [facility] [address] [phone] [employer] [facility] 2/3 [phone] Your Updated Medication List  
  
   
This list is accurate as of: 5/18/17 11:12 AM.  Always use your most recent med list.  
  
  
  
  
 acetaminophen 325 mg tablet Commonly known as:  TYLENOL  
325 mg.  
  
 carvedilol 3.125 mg tablet Commonly known as:  Marin Cocker Take 1 Tab by mouth two (2) times daily (with meals). docosanol 10 % topical cream  
Commonly known as:  Angy Points Thin film enalapril 10 mg tablet Commonly known as:  Elfreda Locket Take 1 Tab by mouth two (2) times a day. ferrous sulfate 300 mg (60 mg iron)/5 mL syrup TAKE ONE TEASPOONFUL (300MG) BY MOUTH TWICE DAILY  
  
 hydroCHLOROthiazide 25 mg tablet Commonly known as:  HYDRODIURIL Take 1 Tab by mouth daily. multivit with min-FA-lycopene 400-300 mcg Tab Commonly known as:  ONE-A-DAY MEN'S MULTIVITAMIN Take 1 Each by mouth daily. potassium chloride 10 mEq tablet Commonly known as:  KLOR-CON  
TAKE 1 TABLET BY MOUTH ONCE DAILY FOR LOW BLOOD LEVELS (MAY CRUSH ANDTAKE WITH APPLESAUCE)  
  
 raNITIdine 150 mg tablet Commonly known as:  ZANTAC Take 1 Tab by mouth two (2) times a day. senna-docusate 8.6-50 mg per tablet Commonly known as:  Ajay Irina Take 1 Tab by mouth two (2) times a day. simvastatin 20 mg tablet Commonly known as:  ZOCOR Take 1 Tab by mouth nightly. sorbitol 70 % solution TAKE 30ML BY MOUTH TWICE DAILY FOR LAXATIVE  
  
 sucralfate 1 gram tablet Commonly known as:  CARAFATE  
TAKE 1 TABLET BY MOUTH FOUR TIMES DAILY (AFTER MEALS & AT BEDTIME) FOR GERD Swab Swab Commonly known as:  TOOTHETTE Sig: Use as needed  
  
 therapeutic multivitamin-minerals tablet Commonly known as:  THERAGRAN-M Take 1 Tab by mouth daily. VITAMIN B-12 2,500 mcg sublingual tablet Generic drug:  cyanocobalamin TAKE 1 TABLET BY MOUTH ONCE DAILY Follow-up Instructions Return in about 3 months (around 8/18/2017) for Labs 1 week before. To-Do List   
 08/16/2017 Lab:  CBC WITH AUTOMATED DIFF   
  
 08/16/2017 Lab:  VITAMIN D, 25 HYDROXY Patient Instructions A Healthy Lifestyle: Care Instructions Your Care Instructions A healthy lifestyle can help you feel good, stay at a healthy weight, and have plenty of energy for both work and play. A healthy lifestyle is something you can share with your whole family. A healthy lifestyle also can lower your risk for serious health problems, such as high blood pressure, heart disease, and diabetes. You can follow a few steps listed below to improve your health and the health of your family. Follow-up care is a key part of your treatment and safety. Be sure to make and go to all appointments, and call your doctor if you are having problems. Its also a good idea to know your test results and keep a list of the medicines you take. How can you care for yourself at home? · Do not eat too much sugar, fat, or fast foods. You can still have dessert and treats now and then. The goal is moderation. · Start small to improve your eating habits. Pay attention to portion sizes, drink less juice and soda pop, and eat more fruits and vegetables. ¨ Eat a healthy amount of food. A 3-ounce serving of meat, for example, is about the size of a deck of cards. Fill the rest of your plate with vegetables and whole grains. ¨ Limit the amount of soda and sports drinks you have every day. Drink more water when you are thirsty. ¨ Eat at least 5 servings of fruits and vegetables every day. It may seem like a lot, but it is not hard to reach this goal. A serving or helping is 1 piece of fruit, 1 cup of vegetables, or 2 cups of leafy, raw vegetables. Have an apple or some carrot sticks as an afternoon snack instead of a candy bar. Try to have fruits and/or vegetables at every meal. 
· Make exercise part of your daily routine. You may want to start with simple activities, such as walking, bicycling, or slow swimming. Try to be active 30 to 60 minutes every day. You do not need to do all 30 to 60 minutes all at once. For example, you can exercise 3 times a day for 10 or 20 minutes. Moderate exercise is safe for most people, but it is always a good idea to talk to your doctor before starting an exercise program. 
· Keep moving. Rena Henriquezbs the lawn, work in the garden, or [employer].  Take the stairs instead of the elevator at work. · If you smoke, quit. People who smoke have an increased risk for heart attack, stroke, cancer, and other lung illnesses. Quitting is hard, but there are ways to boost your chance of quitting tobacco for good. ¨ Use nicotine gum, patches, or lozenges. ¨ Ask your doctor about stop-smoking programs and medicines. ¨ Keep trying. In addition to reducing your risk of diseases in the future, you will notice some benefits soon after you stop using tobacco. If you have shortness of breath or asthma symptoms, they will likely get better within a few weeks after you quit. · Limit how much alcohol you drink. Moderate amounts of alcohol (up to 2 drinks a day for men, 1 drink a day for women) are okay. But drinking too much can lead to liver problems, high blood pressure, and other health problems. Family health If you have a family, there are many things you can do together to improve your health. · Eat meals together as a family as often as possible. · Eat healthy foods. This includes fruits, vegetables, lean meats and dairy, and whole grains. · Include your family in your fitness plan. Most people think of activities such as jogging or tennis as the way to fitness, but there are many ways you and your family can be more active. Anything that makes you breathe hard and gets your heart pumping is exercise. Here are some tips: 
¨ Walk to do errands or to take your child to school or the bus. ¨ Go for a family bike ride after dinner instead of watching TV. Where can you learn more? Go to http://haven-magdi.info/. Enter O233 in the search box to learn more about \"A Healthy Lifestyle: Care Instructions. \" Current as of: July 26, 2016 Content Version: 11.2 © 3548-3524 [employer].  Care instructions adapted under license by [employer] (which disclaims liability or warranty for this information). If you have questions about a medical condition or this instruction, always ask your healthcare professional. [address] any warranty or liability for your use of this information. Iron Deficiency Anemia: Care Instructions Your Care Instructions Anemia means that you do not have enough red blood cells. Red blood cells carry oxygen around your body. When you have anemia, it can make you pale, weak, and tired. Many things can cause anemia. The most common cause is loss of blood. This can happen if you have heavy menstrual periods. It can also happen if you have bleeding in your stomach or bowel. You can also get anemia if you don't have enough iron in your diet or if it's hard for your body to absorb iron. In some cases, pregnancy causes anemia. That's because a pregnant woman needs more iron. Your doctor may do more tests to find the cause of your anemia. If a disease or other health problem is causing it, your doctor will treat that problem. It's important to follow up with your doctor to make sure that your iron level returns to normal. 
Follow-up care is a key part of your treatment and safety. Be sure to make and go to all appointments, and call your doctor if you are having problems. It's also a good idea to know your test results and keep a list of the medicines you take. How can you care for yourself at home? · If your doctor recommended iron pills, take them as directed. ¨ Try to take the pills on an empty stomach. You can do this about 1 hour before or 2 hours after meals. But you may need to take iron with food to avoid an upset stomach. ¨ Do not take antacids or drink milk or anything with caffeine within 2 hours of when you take your iron. They can keep your body from absorbing the iron well. ¨ Vitamin C helps your body absorb iron.  You may want to take iron pills with a glass of orange juice or some other food high in vitamin C. 
 ¨ Iron pills may cause stomach problems. These include heartburn, nausea, diarrhea, constipation, and cramps. It can help to drink plenty of fluids and include fruits, vegetables, and fiber in your diet. ¨ It's normal for iron pills to make your stool a greenish or grayish black. But internal bleeding can also cause dark stool. So it's important to tell your doctor about any color changes. ¨ Call your doctor if you think you are having a problem with your iron pills. Even after you start to feel better, it will take several months for your body to build up its supply of iron. ¨ If you miss a pill, don't take a double dose. ¨ Keep iron pills out of the reach of small children. Too much iron can be very dangerous. · Eat foods with a lot of iron. These include red meat, shellfish, poultry, and eggs. They also include beans, raisins, whole-grain bread, and leafy green vegetables. · Steam your vegetables. This is the best way to prepare them if you want to get as much iron as possible. · Be safe with medicines. Do not take nonsteroidal anti-inflammatory pain relievers unless your doctor tells you to. These include aspirin, naproxen (Aleve), and ibuprofen (Advil, Motrin). · Liquid iron can stain your teeth. But you can mix it with water or juice and drink it with a straw. Then it won't get on your teeth. When should you call for help? Call 911 anytime you think you may need emergency care. For example, call if: 
· You passed out (lost consciousness). · You vomit blood or what looks like coffee grounds. · You pass maroon or very bloody stools. Call your doctor now or seek immediate medical care if: 
· Your stools are black and look like tar, or they have streaks of blood. · You are dizzy or lightheaded, or you feel like you may faint. Watch closely for changes in your health, and be sure to contact your doctor if: 
· Your fatigue and weakness continue or get worse. · You have side effects from taking iron pills, such as nausea, vomiting, constipation, diarrhea, or heartburn. · You do not get better as expected. Where can you learn more? Go to http://haven-magdi.info/. Enter P932 in the search box to learn more about \"Iron Deficiency Anemia: Care Instructions. \" Current as of: October 13, 2016 Content Version: 11.2 © 1446-2548 [employer]. Care instructions adapted under license by [employer] (which disclaims liability or warranty for this information). If you have questions about a medical condition or this instruction, always ask your healthcare professional. [address] any warranty or liability for your use of this information. Introducing [facility] & HEALTH SERVICES! [facility] introduces Lono patient portal. Now you can access parts of your medical record, email your doctor's office, and request medication refills online. 1. In your internet browser, go to [email or web address] 2. Click on the First Time User? Click Here link in the Sign In box. You will see the New Member Sign Up page. 3. Enter your Lono Access Code exactly as it appears below. You will not need to use this code after youve completed the sign-up process. If you do not sign up before the expiration date, you must request a new code. · Lono Access Code: [email or web address] Expires: 5/29/2017 11:00 AM 
 
4. Enter the last four digits of your Social Security Number (xxxx) and Date of Birth (mm/dd/yyyy) as indicated and click Submit. You will be taken to the next sign-up page. 5. Create a SOASTAt ID. This will be your Lono login ID and cannot be changed, so think of one that is secure and easy to remember. 6. Create a Lono password. You can change your password at any time. 7. Enter your Password Reset Question and Answer. This can be used at a later time if you forget your password. 8. Enter your e-mail address. You will receive e-mail notification when new information is available in [address] 9. Click Sign Up. You can now view and download portions of your medical record. 10. Click the Download Summary menu link to download a portable copy of your medical information. If you have questions, please visit the Frequently Asked Questions section of the [employer] website. Remember, [employer] is NOT to be used for urgent needs. For medical emergencies, dial 911. Now available from your iPhone and Android! Please provide this summary of care documentation to your next provider. Your primary care clinician is listed as Deidre Riedel. If you have any questions after today's visit, please call [phone].

## 2017-05-18 NOTE — PROGRESS NOTES
Chief Complaint   Patient presents with   [facility] F/U       HPI:  Patient is a 57year old  male with medical problems listed below presents today for follow up post ED visit. He was [place] [facility] [place] on 5/12/17 after caretaker noted that he was feeling lethargic, fatigued, not very responsive and was noted to be more sleepy than usual. He underwent extensive work up in the ED including CT Head, CXR, labs including troponin, and EKG that were all unremarkable and later woke up and was back to his baseline and was discharged from the ED. He was accompanied to today's visit by his  Ms. Seven Altman who stated that he has been feeling well post discharge from the ED with no significant complaints reported today. In addition, she also stated that he has been taking his medications as prescribed with no adverse effects noted. Past Medical History:   Diagnosis Date    Constipation, chronic     Dyslipidemia 4/8/2014    Femur fracture ([address] 7/7/16    brace at right femur    GERD (gastroesophageal reflux disease)     Hypertension     MR (mental retardation), severe     Muscle weakness of lower extremity     with spasticity    Urinary incontinence due to cognitive impairment 8/23/2012       No Known Allergies      Current Outpatient Prescriptions   Medication Sig Dispense Refill    potassium chloride (K-DUR, KLOR-CON) 10 mEq tablet TAKE 1 TABLET BY MOUTH ONCE DAILY FOR LOW BLOOD LEVELS (MAY CRUSH ANDTAKE WITH APPLESAUCE) 30 Tab 0    VITAMIN B-12 2,500 mcg sublingual tablet TAKE 1 TABLET BY MOUTH ONCE DAILY 30 Tab 0    ferrous sulfate 300 mg (60 mg iron)/5 mL syrup TAKE ONE TEASPOONFUL (300MG) BY MOUTH TWICE DAILY 240 mL 3    carvedilol (COREG) 3.125 mg tablet Take 1 Tab by mouth two (2) times daily (with meals). 60 Tab 0    enalapril (VASOTEC) 10 mg tablet Take 1 Tab by mouth two (2) times a day.  30 Tab 0    therapeutic multivitamin-minerals (THERAGRAN-M) tablet Take 1 Tab by mouth daily. 30 Tab 0    hydroCHLOROthiazide (HYDRODIURIL) 25 mg tablet Take 1 Tab by mouth daily. 30 Tab 5    multivit with min-FA-lycopene (ONE-A-DAY MEN'S MULTIVITAMIN) 400-300 mcg tab Take 1 Each by mouth daily. 90 Each 3    raNITIdine (ZANTAC) 150 mg tablet Take 1 Tab by mouth two (2) times a day. 180 Tab 2    senna-docusate (PERICOLACE) 8.6-50 mg per tablet Take 1 Tab by mouth two (2) times a day. 180 Tab 1    simvastatin (ZOCOR) 20 mg tablet Take 1 Tab by mouth nightly. 90 Tab 3    sorbitol 70 % solution TAKE 30ML BY MOUTH TWICE DAILY FOR LAXATIVE 473 mL PRN    sucralfate (CARAFATE) 1 gram tablet TAKE 1 TABLET BY MOUTH FOUR TIMES DAILY (AFTER MEALS & AT BEDTIME) FOR GERD 120 Tab 3    Swab (TOOTHETTE) swab Sig: Use as needed 600 Each 3    acetaminophen (TYLENOL) 325 mg tablet 325 mg.      docosanol (ABREVA) 10 % topical cream Thin film 2 g 0          ROS:  Unable to review as pt mentally challenged        Physical Exam:  Visit Vitals    /72 (BP 1 Location: Left arm, BP Patient Position: Sitting)    Pulse 63    Temp 97.5 °F (36.4 °C) (Axillary)    Resp 18    SpO2 99%     General: Black male in wheelchair, awake, interactive and smiling, afebrile, in no acute distress  Respiratory: symmetrical chest expansion, lung sounds clear bilaterally, good air entry, good respiratory effort, no wheezes or crackles  Cardiovascular: normal S1S2, regular rate and rhythm, no murmurs, pulses palpable, no thrill, no carotid or abdominal bruits, no JVD  Abdomen: soft, non-distended, normoactive bowel sounds x 4 quadrants. Extremity: No cyanosis or edema noted, though all four extremity contracted      Assessment/Plan:    ICD-10-CM ICD-9-CM    1. MR (mental retardation), severe F72 318.1    2. Essential hypertension I10 401.9 Controlled  Continue current meds   3.  Iron deficiency anemia, unspecified iron deficiency anemia type D50.9 280.9 Recent Hb improved at 13. 6. Continue ferrous sulfate  CBC WITH AUTOMATED DIFF   4. Dyslipidemia E78.5 272.4 Stable   5. Vitamin D deficiency E55.9 268.9 VITAMIN D, 25 HYDROXY       Orders Placed This Encounter    CBC WITH AUTOMATED DIFF     Standing Status:   Future     Standing Expiration Date:   2017    VITAMIN D, 25 HYDROXY     Standing Status:   Future     Standing Expiration Date:   2017    acetaminophen (TYLENOL) 325 mg tablet     Si mg. Review of records of recent ED visit was done by me        Additional Notes: Discussed today's diagnosis, treatment plans. Discussed medication indications and side effects. After Visit Summary: Discussed provided printed patient instructions. Answered questions accordingly. Follow-up Disposition: In 3 months with labs 1 week prior        Peyman Harris DO, MPH  Internal Medicine      Total time spent for this visit was 25 minutes with greater than 50% of the time spent involved in counseling and coordination of care as outlined above.

## 2017-06-15 ENCOUNTER — TELEPHONE (OUTPATIENT)
Dept: INTERNAL MEDICINE CLINIC | Age: 62
End: 2017-06-15

## 2017-06-15 DIAGNOSIS — E87.6 HYPOKALEMIA: ICD-10-CM

## 2017-06-15 RX ORDER — POTASSIUM CHLORIDE 750 MG/1
TABLET, EXTENDED RELEASE ORAL
Qty: 30 TAB | Refills: 0 | Status: SHIPPED | OUTPATIENT
Start: 2017-06-15 | End: 2017-08-14 | Stop reason: SDUPTHER

## 2017-06-15 NOTE — TELEPHONE ENCOUNTER
I called Hollie Informed her to monitor patients BP twice  A day for the next   2 days and if his HR is less than 50 and the SBP is less than 110 then they will need to contact the office.

## 2017-06-15 NOTE — TELEPHONE ENCOUNTER
I called Minna Cronin in regards to medication dosing. Asked what his Vital Signs are, Hollie sts that she will have to review the log and call us back.

## 2017-06-15 NOTE — TELEPHONE ENCOUNTER
They should check his BP twice daily for the next 2 days and if HR is less than 50 and SBP < 110, then call us back.

## 2017-06-15 NOTE — TELEPHONE ENCOUNTER
Vitals are okay - call them to hold Coreg for this morning and continue with the PM dose and rest of his medications as prescribed.

## 2017-06-15 NOTE — TELEPHONE ENCOUNTER
I called Lori Whiting back in regards to  Pt. Felipe Browne that per Dr Mel Esparza to hold the Coreg for this morning and continue with PM dose. Scott V[place] that he has already had his morning dose. DR Mel Esparza  Please advise.

## 2017-06-15 NOTE — TELEPHONE ENCOUNTER
Carmen Thibodeaux with group home [facility] calling to make Dr Thania Castro aware and to get written instructions/advice regarding:     pt received a double dose of his 5 & 6 PM medications listed below  Coreg, Iron & sucralfate    Request fax written advice to  [phone]

## 2017-06-19 NOTE — TELEPHONE ENCOUNTER
I called Pt in regards to Rx refill. Informed pt that Rx for potassium has been refilled and sent to the pharmacy.

## 2017-07-13 RX ORDER — SUCRALFATE 1 G/1
TABLET ORAL
Qty: 120 TAB | Refills: 0 | Status: SHIPPED | OUTPATIENT
Start: 2017-07-13 | End: 2017-09-22 | Stop reason: SDUPTHER

## 2017-07-18 RX ORDER — FERROUS SULFATE 300 MG/5ML
LIQUID (ML) ORAL
Qty: 500 ML | Refills: 0 | Status: SHIPPED | OUTPATIENT
Start: 2017-07-18 | End: 2017-09-05 | Stop reason: SDUPTHER

## 2017-08-14 DIAGNOSIS — E87.6 HYPOKALEMIA: ICD-10-CM

## 2017-08-15 RX ORDER — POTASSIUM CHLORIDE 750 MG/1
TABLET, EXTENDED RELEASE ORAL
Qty: 30 TAB | Refills: 0 | Status: SHIPPED | OUTPATIENT
Start: 2017-08-15 | End: 2017-10-09 | Stop reason: SDUPTHER

## 2017-08-28 ENCOUNTER — HOSPITAL ENCOUNTER (OUTPATIENT)
Dept: LAB | Age: 62
Discharge: HOME | End: 2017-08-28
Payer: MEDICARE

## 2017-08-28 ENCOUNTER — LAB ONLY (OUTPATIENT)
Dept: INTERNAL MEDICINE CLINIC | Age: 62
End: 2017-08-28

## 2017-08-28 DIAGNOSIS — E55.9 VITAMIN D DEFICIENCY: ICD-10-CM

## 2017-08-28 DIAGNOSIS — D50.9 IRON DEFICIENCY ANEMIA, UNSPECIFIED IRON DEFICIENCY ANEMIA TYPE: ICD-10-CM

## 2017-08-28 LAB
BASOPHILS # BLD: 0 K/UL (ref 0–0.06)
BASOPHILS NFR BLD: 0 % (ref 0–2)
DIFFERENTIAL METHOD BLD: ABNORMAL
EOSINOPHIL # BLD: 0.2 K/UL (ref 0–0.4)
EOSINOPHIL NFR BLD: 3 % (ref 0–5)
ERYTHROCYTE [DISTWIDTH] IN BLOOD BY AUTOMATED COUNT: 16.4 % (ref 11.6–14.5)
FERRITIN SERPL-MCNC: 17 NG/ML (ref 8–388)
HCT VFR BLD AUTO: 42.1 % (ref 36–48)
HGB BLD-MCNC: 13.3 G/DL (ref 13–16)
LYMPHOCYTES # BLD: 1 K/UL (ref 0.9–3.6)
LYMPHOCYTES NFR BLD: 20 % (ref 21–52)
MCH RBC QN AUTO: 27 PG (ref 24–34)
MCHC RBC AUTO-ENTMCNC: 31.6 G/DL (ref 31–37)
MCV RBC AUTO: 85.4 FL (ref 74–97)
MONOCYTES # BLD: 0.4 K/UL (ref 0.05–1.2)
MONOCYTES NFR BLD: 8 % (ref 3–10)
NEUTS SEG # BLD: 3.3 K/UL (ref 1.8–8)
NEUTS SEG NFR BLD: 69 % (ref 40–73)
PLATELET # BLD AUTO: 255 K/UL (ref 135–420)
PMV BLD AUTO: 11.8 FL (ref 9.2–11.8)
RBC # BLD AUTO: 4.93 M/UL (ref 4.7–5.5)
WBC # BLD AUTO: 4.8 K/UL (ref 4.6–13.2)

## 2017-08-28 PROCEDURE — 85025 COMPLETE CBC W/AUTO DIFF WBC: CPT | Performed by: HOSPITALIST

## 2017-08-28 PROCEDURE — 82306 VITAMIN D 25 HYDROXY: CPT | Performed by: HOSPITALIST

## 2017-08-28 PROCEDURE — 82728 ASSAY OF FERRITIN: CPT | Performed by: HOSPITALIST

## 2017-08-28 PROCEDURE — 36415 COLL VENOUS BLD VENIPUNCTURE: CPT | Performed by: HOSPITALIST

## 2017-08-29 LAB — 25(OH)D3 SERPL-MCNC: 110.6 NG/ML (ref 30–100)

## 2017-08-30 RX ORDER — CHOLECALCIFEROL (VITAMIN D3) 50 MCG
TABLET ORAL
Qty: 110 TAB | Refills: 0 | Status: SHIPPED | OUTPATIENT
Start: 2017-08-30 | End: 2018-06-19 | Stop reason: SDUPTHER

## 2017-08-30 NOTE — TELEPHONE ENCOUNTER
I called Pt in regards to Rx refill. Informed pt that Rx for Vit B12 was refilled and sent to the pharmacy. Pt verbalized understanding.

## 2017-09-14 RX ORDER — FERROUS SULFATE 300 MG/5ML
LIQUID (ML) ORAL
Qty: 500 ML | Refills: 0 | Status: SHIPPED | OUTPATIENT
Start: 2017-09-14 | End: 2017-11-09 | Stop reason: SDUPTHER

## 2017-09-14 NOTE — TELEPHONE ENCOUNTER
I call Pt in regards to Rx refill on Iron. LM on VM to Informed Pt that Rx was refilled and sent to the pharmacy.

## 2017-09-22 DIAGNOSIS — K59.09 CONSTIPATION, CHRONIC: ICD-10-CM

## 2017-09-22 RX ORDER — CARVEDILOL 3.12 MG/1
3.12 TABLET ORAL 2 TIMES DAILY WITH MEALS
Qty: 60 TAB | Refills: 0 | Status: SHIPPED | OUTPATIENT
Start: 2017-09-22 | End: 2017-10-09 | Stop reason: SDUPTHER

## 2017-09-22 RX ORDER — HYDROCHLOROTHIAZIDE 25 MG/1
25 TABLET ORAL DAILY
Qty: 30 TAB | Refills: 0 | Status: SHIPPED | OUTPATIENT
Start: 2017-09-22 | End: 2018-09-12

## 2017-09-22 RX ORDER — SUCRALFATE 1 G/1
TABLET ORAL
Qty: 120 TAB | Refills: 0 | Status: SHIPPED | OUTPATIENT
Start: 2017-09-22 | End: 2017-10-09 | Stop reason: SDUPTHER

## 2017-09-22 RX ORDER — AMOXICILLIN 250 MG
1 CAPSULE ORAL 2 TIMES DAILY
Qty: 180 TAB | Refills: 0 | Status: SHIPPED | OUTPATIENT
Start: 2017-09-22 | End: 2017-12-28 | Stop reason: SDUPTHER

## 2017-09-22 NOTE — TELEPHONE ENCOUNTER
Please also refill Vit D2 once every week. .    [facility]  Manager states that pt is completely out of all the medication and would like a call back before 3 pm today if the meds are not able to be refilled. Pt is due to have the vit d on 9/27. Please review and advise.

## 2017-10-09 DIAGNOSIS — E87.6 HYPOKALEMIA: ICD-10-CM

## 2017-10-09 RX ORDER — ERGOCALCIFEROL 1.25 MG/1
CAPSULE ORAL
Qty: 4 CAP | Refills: 3 | Status: SHIPPED | OUTPATIENT
Start: 2017-10-09 | End: 2018-04-19 | Stop reason: SDUPTHER

## 2017-10-09 RX ORDER — POTASSIUM CHLORIDE 750 MG/1
TABLET, EXTENDED RELEASE ORAL
Qty: 30 TAB | Refills: 3 | Status: SHIPPED | OUTPATIENT
Start: 2017-10-09 | End: 2018-02-22 | Stop reason: SDUPTHER

## 2017-10-09 NOTE — TELEPHONE ENCOUNTER
I call Pt in regards to Rx refill on Potassium and Vitamin D. LM on VM to  Informed Pt that Rx was refilled and sent to the pharmacy.

## 2017-10-11 RX ORDER — SUCRALFATE 1 G/1
TABLET ORAL
Qty: 120 TAB | Refills: 0 | Status: SHIPPED | OUTPATIENT
Start: 2017-10-11 | End: 2017-11-04 | Stop reason: SDUPTHER

## 2017-10-11 RX ORDER — HYDROCHLOROTHIAZIDE 25 MG/1
TABLET ORAL
Qty: 30 TAB | Refills: 0 | Status: SHIPPED | OUTPATIENT
Start: 2017-10-11 | End: 2018-09-12

## 2017-10-11 RX ORDER — CARVEDILOL 3.12 MG/1
TABLET ORAL
Qty: 60 TAB | Refills: 0 | Status: SHIPPED | OUTPATIENT
Start: 2017-10-11 | End: 2017-11-04 | Stop reason: SDUPTHER

## 2017-11-06 RX ORDER — SUCRALFATE 1 G/1
TABLET ORAL
Qty: 120 TAB | Refills: 0 | Status: SHIPPED | OUTPATIENT
Start: 2017-11-06 | End: 2017-12-28 | Stop reason: SDUPTHER

## 2017-11-06 RX ORDER — HYDROCHLOROTHIAZIDE 25 MG/1
TABLET ORAL
Qty: 30 TAB | Refills: 0 | Status: SHIPPED | OUTPATIENT
Start: 2017-11-06 | End: 2018-10-03

## 2017-11-06 RX ORDER — CARVEDILOL 3.12 MG/1
TABLET ORAL
Qty: 60 TAB | Refills: 0 | Status: SHIPPED | OUTPATIENT
Start: 2017-11-06 | End: 2017-12-28 | Stop reason: SDUPTHER

## 2017-11-06 NOTE — TELEPHONE ENCOUNTER
Coreg, HCTZ, and Carafate refilled and sent to pharmacy. Please call and notify group home staff and also advise them to make a f/u appt for patient. Suresh Vo

## 2017-11-09 RX ORDER — FERROUS SULFATE 300 MG/5ML
LIQUID (ML) ORAL
Qty: 500 ML | Refills: 3 | Status: SHIPPED | OUTPATIENT
Start: 2017-11-09 | End: 2018-03-09 | Stop reason: SDUPTHER

## 2017-11-09 NOTE — TELEPHONE ENCOUNTER
Requested Prescriptions     Pending Prescriptions Disp Refills    ferrous sulfate 300 mg (60 mg iron)/5 mL syrup 500 mL 0

## 2017-11-13 ENCOUNTER — OFFICE VISIT (OUTPATIENT)
Dept: FAMILY MEDICINE CLINIC | Age: 62
End: 2017-11-13

## 2017-11-13 VITALS
HEIGHT: 60 IN | TEMPERATURE: 98.2 F | HEART RATE: 63 BPM | DIASTOLIC BLOOD PRESSURE: 113 MMHG | SYSTOLIC BLOOD PRESSURE: 171 MMHG | OXYGEN SATURATION: 98 % | RESPIRATION RATE: 18 BRPM

## 2017-11-13 DIAGNOSIS — K21.9 GASTROESOPHAGEAL REFLUX DISEASE WITHOUT ESOPHAGITIS: ICD-10-CM

## 2017-11-13 DIAGNOSIS — E55.9 VITAMIN D DEFICIENCY: Primary | ICD-10-CM

## 2017-11-13 DIAGNOSIS — E78.5 DYSLIPIDEMIA: ICD-10-CM

## 2017-11-13 DIAGNOSIS — I10 ESSENTIAL HYPERTENSION: ICD-10-CM

## 2017-11-13 NOTE — PROGRESS NOTES
Chief Complaint   Patient presents with    Results       HPI:  Patient is a 60year old  male with medical problems listed below presents today for follow up of GERD, Hypertension, Hyperlipidemia, Vit D def, etc. He was accompanied by his group home staff who stated that he has been feeling well with no significant complaints reported today. In addition, she also stated that he has been taking his medications with no adverse effects noted. Past Medical History:   Diagnosis Date    Constipation, chronic     Dyslipidemia 4/8/2014    Femur fracture ([address] 7/7/16    brace at right femur    GERD (gastroesophageal reflux disease)     Hypertension     MR (mental retardation), severe     Muscle weakness of lower extremity     with spasticity    Urinary incontinence due to cognitive impairment 8/23/2012       No Known Allergies      Current Outpatient Prescriptions   Medication Sig Dispense Refill    ferrous sulfate 300 mg (60 mg iron)/5 mL syrup TAKE ONE TEASPOONFUL (300MG) BY MOUTH TWICE DAILY 500 mL 3    carvedilol (COREG) 3.125 mg tablet TAKE 1 TABLET BY MOUTH TWICE DAILY WITH MEALS (BKFST & SUPPER) FOR HTN 60 Tab 0    hydroCHLOROthiazide (HYDRODIURIL) 25 mg tablet TAKE 1 TABLET BY MOUTH ONCE DAILY 30 Tab 0    sucralfate (CARAFATE) 1 gram tablet TAKE 1 TABLET BY MOUTH FOUR TIMES DAILY AFTER MEALS AND AT BEDTIME FOR GERD 120 Tab 0    hydroCHLOROthiazide (HYDRODIURIL) 25 mg tablet TAKE 1 TABLET BY MOUTH ONCE DAILY 30 Tab 0    potassium chloride (KLOR-CON) 10 mEq tablet TAKE 1 TABLET BY MOUTH ONCE DAILY FOR LOW BLOOD LEVELS (MAY CRUSH ANDTAKE WITH APPLESAUCE) 30 Tab 3    ergocalciferol (ERGOCALCIFEROL) 50,000 unit capsule TAKE 1 CAPSULE BY MOUTH EVERY 7 DAYS 4 Cap 3    hydroCHLOROthiazide (HYDRODIURIL) 25 mg tablet Take 1 Tab by mouth daily. 30 Tab 0    senna-docusate (PERICOLACE) 8.6-50 mg per tablet Take 1 Tab by mouth two (2) times a day.  180 Tab 0    VITAMIN B-12 2,500 mcg sublingual tablet TAKE 1 TABLET BY MOUTH ONCE DAILY 110 Tab 0    acetaminophen (TYLENOL) 325 mg tablet 325 mg.      enalapril (VASOTEC) 10 mg tablet Take 1 Tab by mouth two (2) times a day. 30 Tab 0    therapeutic multivitamin-minerals (THERAGRAN-M) tablet Take 1 Tab by mouth daily. 30 Tab 0    docosanol (ABREVA) 10 % topical cream Thin film 2 g 0    multivit with min-FA-lycopene (ONE-A-DAY MEN'S MULTIVITAMIN) 400-300 mcg tab Take 1 Each by mouth daily. 90 Each 3    raNITIdine (ZANTAC) 150 mg tablet Take 1 Tab by mouth two (2) times a day. 180 Tab 2    simvastatin (ZOCOR) 20 mg tablet Take 1 Tab by mouth nightly. 90 Tab 3    sorbitol 70 % solution TAKE 30ML BY MOUTH TWICE DAILY FOR LAXATIVE 473 mL PRN    Swab (TOOTHETTE) swab Sig: Use as needed 600 Each 3          ROS:  Unable to review as pt mentally challenged        Physical Exam:  Visit Vitals    BP (!) 171/113 (BP 1 Location: Left arm, BP Patient Position: Sitting)    Pulse 63    Temp 98.2 °F (36.8 °C) (Oral)    Resp 18    Ht 4' 10\" (1.473 m)    SpO2 98%     General: Black male in wheelchair, sleeping, afebrile, in no acute distress  Respiratory: symmetrical chest expansion, lung sounds clear bilaterally, good air entry, good respiratory effort, no wheezes or crackles  Cardiovascular: normal S1S2, regular rate and rhythm, no murmurs, pulses palpable, no thrill, no carotid or abdominal bruits, no JVD  Abdomen: soft, non-distended, normoactive bowel sounds x 4 quadrants. Extremity: No edema noted      Assessment/Plan:    ICD-10-CM ICD-9-CM    1. Vitamin D deficiency E55.9 268.9 Continue Vit D supplementation   2. Dyslipidemia E78.5 272.4 Well controlled   3. Essential hypertension I10 401.9 Continue current meds  TSH 3RD GENERATION      T4, FREE      METABOLIC PANEL, COMPREHENSIVE   4.  Gastroesophageal reflux disease without esophagitis K21.9 530.81 Stable         Orders Placed This Encounter    TSH 3RD GENERATION     Standing Status:   Future Standing Expiration Date:   11/14/2018    T4, FREE     Standing Status:   Future     Standing Expiration Date:   77/09/4819    METABOLIC PANEL, COMPREHENSIVE     Standing Status:   Future     Standing Expiration Date:   11/14/2018       Group home paperwork filled out        Additional Notes: Discussed today's diagnosis, treatment plans. Discussed medication indications and side effects. After Visit Summary: Discussed provided printed patient instructions. Answered questions accordingly.   Follow-up Disposition: In 1 week to review labs        Leif Downey DO, MPH  Internal Medicine

## 2017-11-13 NOTE — PATIENT INSTRUCTIONS
A Healthy Lifestyle: Care Instructions  Your Care Instructions    A healthy lifestyle can help you feel good, stay at a healthy weight, and have plenty of energy for both work and play. A healthy lifestyle is something you can share with your whole family. A healthy lifestyle also can lower your risk for serious health problems, such as high blood pressure, heart disease, and diabetes. You can follow a few steps listed below to improve your health and the health of your family. Follow-up care is a key part of your treatment and safety. Be sure to make and go to all appointments, and call your doctor if you are having problems. It's also a good idea to know your test results and keep a list of the medicines you take. How can you care for yourself at home? · Do not eat too much sugar, fat, or fast foods. You can still have dessert and treats now and then. The goal is moderation. · Start small to improve your eating habits. Pay attention to portion sizes, drink less juice and soda pop, and eat more fruits and vegetables. ¨ Eat a healthy amount of food. A 3-ounce serving of meat, for example, is about the size of a deck of cards. Fill the rest of your plate with vegetables and whole grains. ¨ Limit the amount of soda and sports drinks you have every day. Drink more water when you are thirsty. ¨ Eat at least 5 servings of fruits and vegetables every day. It may seem like a lot, but it is not hard to reach this goal. A serving or helping is 1 piece of fruit, 1 cup of vegetables, or 2 cups of leafy, raw vegetables. Have an apple or some carrot sticks as an afternoon snack instead of a candy bar. Try to have fruits and/or vegetables at every meal.  · Make exercise part of your daily routine. You may want to start with simple activities, such as walking, bicycling, or slow swimming. Try to be active 30 to 60 minutes every day. You do not need to do all 30 to 60 minutes all at once.  For example, you can exercise 3 times a day for 10 or 20 minutes. Moderate exercise is safe for most people, but it is always a good idea to talk to your doctor before starting an exercise program.  · Keep moving. Rosa Moulding the lawn, work in the garden, or [employer]. Take the stairs instead of the elevator at work. · If you smoke, quit. People who smoke have an increased risk for heart attack, stroke, cancer, and other lung illnesses. Quitting is hard, but there are ways to boost your chance of quitting tobacco for good. ¨ Use nicotine gum, patches, or lozenges. ¨ Ask your doctor about stop-smoking programs and medicines. ¨ Keep trying. In addition to reducing your risk of diseases in the future, you will notice some benefits soon after you stop using tobacco. If you have shortness of breath or asthma symptoms, they will likely get better within a few weeks after you quit. · Limit how much alcohol you drink. Moderate amounts of alcohol (up to 2 drinks a day for men, 1 drink a day for women) are okay. But drinking too much can lead to liver problems, high blood pressure, and other health problems. Family health  If you have a family, there are many things you can do together to improve your health. · Eat meals together as a family as often as possible. · Eat healthy foods. This includes fruits, vegetables, lean meats and dairy, and whole grains. · Include your family in your fitness plan. Most people think of activities such as jogging or tennis as the way to fitness, but there are many ways you and your family can be more active. Anything that makes you breathe hard and gets your heart pumping is exercise. Here are some tips:  ¨ Walk to do errands or to take your child to school or the bus. ¨ Go for a family bike ride after dinner instead of watching TV. Where can you learn more? Go to http://haven-magdi.info/. Enter L390 in the search box to learn more about \"A Healthy Lifestyle: Care Instructions. \"  Current as of: May 12, 2017  Content Version: 11.4  © 9148-1988 [employer]. Care instructions adapted under license by [employer] (which disclaims liability or warranty for this information). If you have questions about a medical condition or this instruction, always ask your healthcare professional. [address] any warranty or liability for your use of this information. DASH Diet: Care Instructions  Your Care Instructions    The DASH diet is an eating plan that can help lower your blood pressure. DASH stands for Dietary Approaches to Stop Hypertension. Hypertension is high blood pressure. The DASH diet focuses on eating foods that are high in calcium, potassium, and magnesium. These nutrients can lower blood pressure. The foods that are highest in these nutrients are fruits, vegetables, low-fat dairy products, nuts, seeds, and legumes. But taking calcium, potassium, and magnesium supplements instead of eating foods that are high in those nutrients does not have the same effect. The DASH diet also includes whole grains, fish, and poultry. The DASH diet is one of several lifestyle changes your doctor may recommend to lower your high blood pressure. Your doctor may also want you to decrease the amount of sodium in your diet. Lowering sodium while following the DASH diet can lower blood pressure even further than just the DASH diet alone. Follow-up care is a key part of your treatment and safety. Be sure to make and go to all appointments, and call your doctor if you are having problems. It's also a good idea to know your test results and keep a list of the medicines you take. How can you care for yourself at home? Following the DASH diet  · Eat 4 to 5 servings of fruit each day. A serving is 1 medium-sized piece of fruit, ½ cup chopped or canned fruit, 1/4 cup dried fruit, or 4 ounces (½ cup) of fruit juice. Choose fruit more often than fruit juice.   · Eat 4 to 5 servings of vegetables each day. A serving is 1 cup of lettuce or raw leafy vegetables, ½ cup of chopped or cooked vegetables, or 4 ounces (½ cup) of vegetable juice. Choose vegetables more often than vegetable juice. · Get 2 to 3 servings of low-fat and fat-free dairy each day. A serving is 8 ounces of milk, 1 cup of yogurt, or 1 ½ ounces of cheese. · Eat 6 to 8 servings of grains each day. A serving is 1 slice of bread, 1 ounce of dry cereal, or ½ cup of cooked rice, pasta, or cooked cereal. Try to choose whole-grain products as much as possible. · Limit lean meat, poultry, and fish to 2 servings each day. A serving is 3 ounces, about the size of a deck of cards. · Eat 4 to 5 servings of nuts, seeds, and legumes (cooked dried beans, lentils, and split peas) each week. A serving is 1/3 cup of nuts, 2 tablespoons of seeds, or ½ cup of cooked beans or peas. · Limit fats and oils to 2 to 3 servings each day. A serving is 1 teaspoon of vegetable oil or 2 tablespoons of salad dressing. · Limit sweets and added sugars to 5 servings or less a week. A serving is 1 tablespoon jelly or jam, ½ cup sorbet, or 1 cup of lemonade. · Eat less than 2,300 milligrams (mg) of sodium a day. If you limit your sodium to 1,500 mg a day, you can lower your blood pressure even more. Tips for success  · Start small. Do not try to make dramatic changes to your diet all at once. You might feel that you are missing out on your favorite foods and then be more likely to not follow the plan. Make small changes, and stick with them. Once those changes become habit, add a few more changes. · Try some of the following:  ¨ Make it a goal to eat a fruit or vegetable at every meal and at snacks. This will make it easy to get the recommended amount of fruits and vegetables each day. ¨ Try yogurt topped with fruit and nuts for a snack or healthy dessert. ¨ Add lettuce, tomato, cucumber, and onion to sandwiches.   ¨ Combine a ready-made pizza crust with low-fat mozzarella cheese and lots of vegetable toppings. Try using tomatoes, squash, spinach, broccoli, carrots, cauliflower, and onions. ¨ Have a variety of cut-up vegetables with a low-fat dip as an appetizer instead of chips and dip. ¨ Sprinkle sunflower seeds or chopped almonds over salads. Or try adding chopped walnuts or almonds to cooked vegetables. ¨ Try some vegetarian meals using beans and peas. Add garbanzo or kidney beans to salads. Make burritos and tacos with mashed vásquez beans or black beans. Where can you learn more? Go to http://haven-magdi.info/. Enter Q279 in the search box to learn more about \"DASH Diet: Care Instructions. \"  Current as of: September 21, 2016  Content Version: 11.4  © 0414-3116 [employer]. Care instructions adapted under license by [employer] (which disclaims liability or warranty for this information). If you have questions about a medical condition or this instruction, always ask your healthcare professional. [place] [address] any warranty or liability for your use of this information.

## 2017-11-13 NOTE — MR AVS SNAPSHOT
Visit Information Date & Time Provider Department Dept. Phone Encounter #  
 11/13/2017  3:30 PM Xiomara Silverrogen [address] [facility] [phone] [id number] Follow-up Instructions Return in about 1 week (around 11/20/2017) for to review labs. Upcoming Health Maintenance Date Due ZOSTER VACCINE AGE 60> 6/13/2014 Influenza Age 5 to Adult 8/1/2017 DTaP/Tdap/Td series (2 - Td) 3/14/2022 Allergies as of 11/13/2017  Review Complete On: 11/13/2017 By: Edelmira Mcmillan LPN No Known Allergies Current Immunizations  Reviewed on 8/29/2016 Name Date Influenza Vaccine 10/2/2014 Influenza Vaccine Angela Card) 8/29/2016 Influenza Vaccine (Quad) PF 9/30/2015 TDAP Vaccine 3/14/2012 Not reviewed this visit You Were Diagnosed With   
  
 Codes Comments Vitamin D deficiency    -  Primary ICD-10-CM: E55.9 ICD-9-CM: 268.9 Dyslipidemia     ICD-10-CM: E78.5 ICD-9-CM: 272.4 Essential hypertension     ICD-10-CM: I10 
ICD-9-CM: 401.9 Gastroesophageal reflux disease without esophagitis     ICD-10-CM: K21.9 ICD-9-CM: 530.81 Vitals BP Pulse Temp Resp Height(growth percentile) SpO2  
 (!) 171/113 (BP 1 Location: Left arm, BP Patient Position: Sitting) 63 98.2 °F (36.8 °C) (Oral) 18 4' 10\" (1.473 m) 98% Smoking Status Never Smoker Vitals History Preferred Pharmacy Pharmacy Name Phone [facility] [address] [address] [place] [facility] 2/3 [phone] Your Updated Medication List  
  
   
This list is accurate as of: 11/13/17  4:33 PM.  Always use your most recent med list.  
  
  
  
  
 acetaminophen 325 mg tablet Commonly known as:  TYLENOL  
325 mg.  
  
 carvedilol 3.125 mg tablet Commonly known as:  COREG  
TAKE 1 TABLET BY MOUTH TWICE DAILY WITH MEALS (BKFST & SUPPER) FOR HTN  
  
 docosanol 10 % topical cream  
Commonly known as:  ABREVA Thin film enalapril 10 mg tablet Commonly known as:  Devan Diaz Take 1 Tab by mouth two (2) times a day. ergocalciferol 50,000 unit capsule Commonly known as:  ERGOCALCIFEROL  
TAKE 1 CAPSULE BY MOUTH EVERY 7 DAYS  
  
 ferrous sulfate 300 mg (60 mg iron)/5 mL syrup TAKE ONE TEASPOONFUL (300MG) BY MOUTH TWICE DAILY * hydroCHLOROthiazide 25 mg tablet Commonly known as:  HYDRODIURIL Take 1 Tab by mouth daily. * hydroCHLOROthiazide 25 mg tablet Commonly known as:  HYDRODIURIL  
TAKE 1 TABLET BY MOUTH ONCE DAILY * hydroCHLOROthiazide 25 mg tablet Commonly known as:  HYDRODIURIL  
TAKE 1 TABLET BY MOUTH ONCE DAILY  
  
 multivit-min-folic-vit K-lycop 802- mcg Tab Commonly known as:  ONE-A-DAY MEN'S MULTIVITAMIN Take 1 Each by mouth daily. potassium chloride 10 mEq tablet Commonly known as:  KLOR-CON  
TAKE 1 TABLET BY MOUTH ONCE DAILY FOR LOW BLOOD LEVELS (MAY CRUSH ANDTAKE WITH APPLESAUCE)  
  
 raNITIdine 150 mg tablet Commonly known as:  ZANTAC Take 1 Tab by mouth two (2) times a day. senna-docusate 8.6-50 mg per tablet Commonly known as:  Gloria Shall Take 1 Tab by mouth two (2) times a day. simvastatin 20 mg tablet Commonly known as:  ZOCOR Take 1 Tab by mouth nightly. sorbitol 70 % solution TAKE 30ML BY MOUTH TWICE DAILY FOR LAXATIVE  
  
 sucralfate 1 gram tablet Commonly known as:  CARAFATE  
TAKE 1 TABLET BY MOUTH FOUR TIMES DAILY AFTER MEALS AND AT BEDTIME FOR GERD Swab Swab Commonly known as:  TOOTHETTE Sig: Use as needed  
  
 therapeutic multivitamin-minerals tablet Commonly known as:  THERAGRAN-M Take 1 Tab by mouth daily. VITAMIN B-12 2,500 mcg sublingual tablet Generic drug:  cyanocobalamin TAKE 1 TABLET BY MOUTH ONCE DAILY * Notice: This list has 3 medication(s) that are the same as other medications prescribed for you.  Read the directions carefully, and ask your doctor or other care provider to review them with you. Follow-up Instructions Return in about 1 week (around 11/20/2017) for to review labs. To-Do List   
 11/13/2017 Lab:  METABOLIC PANEL, COMPREHENSIVE   
  
 11/13/2017 Lab:  T4, FREE   
  
 11/13/2017 Lab:  TSH 3RD GENERATION Patient Instructions A Healthy Lifestyle: Care Instructions Your Care Instructions A healthy lifestyle can help you feel good, stay at a healthy weight, and have plenty of energy for both work and play. A healthy lifestyle is something you can share with your whole family. A healthy lifestyle also can lower your risk for serious health problems, such as high blood pressure, heart disease, and diabetes. You can follow a few steps listed below to improve your health and the health of your family. Follow-up care is a key part of your treatment and safety. Be sure to make and go to all appointments, and call your doctor if you are having problems. It's also a good idea to know your test results and keep a list of the medicines you take. How can you care for yourself at home? · Do not eat too much sugar, fat, or fast foods. You can still have dessert and treats now and then. The goal is moderation. · Start small to improve your eating habits. Pay attention to portion sizes, drink less juice and soda pop, and eat more fruits and vegetables. ¨ Eat a healthy amount of food. A 3-ounce serving of meat, for example, is about the size of a deck of cards. Fill the rest of your plate with vegetables and whole grains. ¨ Limit the amount of soda and sports drinks you have every day. Drink more water when you are thirsty. ¨ Eat at least 5 servings of fruits and vegetables every day. It may seem like a lot, but it is not hard to reach this goal. A serving or helping is 1 piece of fruit, 1 cup of vegetables, or 2 cups of leafy, raw vegetables. Have an apple or some carrot sticks as an afternoon snack instead of a candy bar. Try to have fruits and/or vegetables at every meal. 
· Make exercise part of your daily routine. You may want to start with simple activities, such as walking, bicycling, or slow swimming. Try to be active 30 to 60 minutes every day. You do not need to do all 30 to 60 minutes all at once. For example, you can exercise 3 times a day for 10 or 20 minutes. Moderate exercise is safe for most people, but it is always a good idea to talk to your doctor before starting an exercise program. 
· Keep moving. [facility] the lawn, work in the garden, or [employer]. Take the stairs instead of the elevator at work. · If you smoke, quit. People who smoke have an increased risk for heart attack, stroke, cancer, and other lung illnesses. Quitting is hard, but there are ways to boost your chance of quitting tobacco for good. ¨ Use nicotine gum, patches, or lozenges. ¨ Ask your doctor about stop-smoking programs and medicines. ¨ Keep trying. In addition to reducing your risk of diseases in the future, you will notice some benefits soon after you stop using tobacco. If you have shortness of breath or asthma symptoms, they will likely get better within a few weeks after you quit. · Limit how much alcohol you drink. Moderate amounts of alcohol (up to 2 drinks a day for men, 1 drink a day for women) are okay. But drinking too much can lead to liver problems, high blood pressure, and other health problems. Family health If you have a family, there are many things you can do together to improve your health. · Eat meals together as a family as often as possible. · Eat healthy foods. This includes fruits, vegetables, lean meats and dairy, and whole grains. · Include your family in your fitness plan.  Most people think of activities such as jogging or tennis as the way to fitness, but there are many ways you and your family can be more active. Anything that makes you breathe hard and gets your heart pumping is exercise. Here are some tips: 
¨ Walk to do errands or to take your child to school or the bus. ¨ Go for a family bike ride after dinner instead of watching TV. Where can you learn more? Go to http://haven-magdi.info/. Enter M440 in the search box to learn more about \"A Healthy Lifestyle: Care Instructions. \" Current as of: May 12, 2017 Content Version: 11.4 © 9310-8098 [employer]. Care instructions adapted under license by [employer] (which disclaims liability or warranty for this information). If you have questions about a medical condition or this instruction, always ask your healthcare professional. [address] any warranty or liability for your use of this information. DASH Diet: Care Instructions Your Care Instructions The DASH diet is an eating plan that can help lower your blood pressure. DASH stands for Dietary Approaches to Stop Hypertension. Hypertension is high blood pressure. The DASH diet focuses on eating foods that are high in calcium, potassium, and magnesium. These nutrients can lower blood pressure. The foods that are highest in these nutrients are fruits, vegetables, low-fat dairy products, nuts, seeds, and legumes. But taking calcium, potassium, and magnesium supplements instead of eating foods that are high in those nutrients does not have the same effect. The DASH diet also includes whole grains, fish, and poultry. The DASH diet is one of several lifestyle changes your doctor may recommend to lower your high blood pressure. Your doctor may also want you to decrease the amount of sodium in your diet. Lowering sodium while following the DASH diet can lower blood pressure even further than just the DASH diet alone. Follow-up care is a key part of your treatment and safety.  Be sure to make and go to all appointments, and call your doctor if you are having problems. It's also a good idea to know your test results and keep a list of the medicines you take. How can you care for yourself at home? Following the DASH diet · Eat 4 to 5 servings of fruit each day. A serving is 1 medium-sized piece of fruit, ½ cup chopped or canned fruit, 1/4 cup dried fruit, or 4 ounces (½ cup) of fruit juice. Choose fruit more often than fruit juice. · Eat 4 to 5 servings of vegetables each day. A serving is 1 cup of lettuce or raw leafy vegetables, ½ cup of chopped or cooked vegetables, or 4 ounces (½ cup) of vegetable juice. Choose vegetables more often than vegetable juice. · Get 2 to 3 servings of low-fat and fat-free dairy each day. A serving is 8 ounces of milk, 1 cup of yogurt, or 1 ½ ounces of cheese. · Eat 6 to 8 servings of grains each day. A serving is 1 slice of bread, 1 ounce of dry cereal, or ½ cup of cooked rice, pasta, or cooked cereal. Try to choose whole-grain products as much as possible. · Limit lean meat, poultry, and fish to 2 servings each day. A serving is 3 ounces, about the size of a deck of cards. · Eat 4 to 5 servings of nuts, seeds, and legumes (cooked dried beans, lentils, and split peas) each week. A serving is 1/3 cup of nuts, 2 tablespoons of seeds, or ½ cup of cooked beans or peas. · Limit fats and oils to 2 to 3 servings each day. A serving is 1 teaspoon of vegetable oil or 2 tablespoons of salad dressing. · Limit sweets and added sugars to 5 servings or less a week. A serving is 1 tablespoon jelly or jam, ½ cup sorbet, or 1 cup of lemonade. · Eat less than 2,300 milligrams (mg) of sodium a day. If you limit your sodium to 1,500 mg a day, you can lower your blood pressure even more. Tips for success · Start small. Do not try to make dramatic changes to your diet all at once.  You might feel that you are missing out on your favorite foods and then be more likely to not follow the plan. Make small changes, and stick with them. Once those changes become habit, add a few more changes. · Try some of the following: ¨ Make it a goal to eat a fruit or vegetable at every meal and at snacks. This will make it easy to get the recommended amount of fruits and vegetables each day. ¨ Try yogurt topped with fruit and nuts for a snack or healthy dessert. ¨ Add lettuce, tomato, cucumber, and onion to sandwiches. ¨ Combine a ready-made pizza crust with low-fat mozzarella cheese and lots of vegetable toppings. Try using tomatoes, squash, spinach, broccoli, carrots, cauliflower, and onions. ¨ Have a variety of cut-up vegetables with a low-fat dip as an appetizer instead of chips and dip. ¨ Sprinkle sunflower seeds or chopped almonds over salads. Or try adding chopped walnuts or almonds to cooked vegetables. ¨ Try some vegetarian meals using beans and peas. Add garbanzo or kidney beans to salads. Make burritos and tacos with mashed vásquez beans or black beans. Where can you learn more? Go to http://haven-magdi.info/. Enter L070 in the search box to learn more about \"DASH Diet: Care Instructions. \" Current as of: September 21, 2016 Content Version: 11.4 © 7600-2514 [employer]. Care instructions adapted under license by [employer] (which disclaims liability or warranty for this information). If you have questions about a medical condition or this instruction, always ask your healthcare professional. [place] [address] any warranty or liability for your use of this information. Introducing [facility]! [address] introduces Fonality patient portal. Now you can access parts of your medical record, email your doctor's office, and request medication refills online. 1. In your internet browser, go to [email or web address] 2. Click on the First Time User? Click Here link in the Sign In box. You will see the New Member Sign Up page. 3. Enter your Attender Access Code exactly as it appears below. You will not need to use this code after youve completed the sign-up process. If you do not sign up before the expiration date, you must request a new code. · Attender Access Code: [email or web address] Expires: 2/11/2018  4:32 PM 
 
4. Enter the last four digits of your Social Security Number (xxxx) and Date of Birth (mm/dd/yyyy) as indicated and click Submit. You will be taken to the next sign-up page. 5. Create a Attender ID. This will be your Attender login ID and cannot be changed, so think of one that is secure and easy to remember. 6. Create a Attender password. You can change your password at any time. 7. Enter your Password Reset Question and Answer. This can be used at a later time if you forget your password. 8. Enter your e-mail address. You will receive e-mail notification when new information is available in [address] 9. Click Sign Up. You can now view and download portions of your medical record. 10. Click the Download Summary menu link to download a portable copy of your medical information. If you have questions, please visit the Frequently Asked Questions section of the Attender website. Remember, Attender is NOT to be used for urgent needs. For medical emergencies, dial 911. Now available from your iPhone and Android! Please provide this summary of care documentation to your next provider. Your primary care clinician is listed as Fiorella Rivas. If you have any questions after today's visit, please call [phone].

## 2017-11-16 ENCOUNTER — OFFICE VISIT (OUTPATIENT)
Dept: INTERNAL MEDICINE CLINIC | Age: 62
End: 2017-11-16

## 2017-11-16 VITALS
HEIGHT: 60 IN | OXYGEN SATURATION: 98 % | TEMPERATURE: 97.6 F | RESPIRATION RATE: 16 BRPM | DIASTOLIC BLOOD PRESSURE: 76 MMHG | HEART RATE: 69 BPM | SYSTOLIC BLOOD PRESSURE: 122 MMHG

## 2017-11-16 DIAGNOSIS — A08.4 VIRAL GASTROENTERITIS: Primary | ICD-10-CM

## 2017-11-16 DIAGNOSIS — I10 ESSENTIAL HYPERTENSION: ICD-10-CM

## 2017-11-16 RX ORDER — ONDANSETRON 4 MG/1
4 TABLET, ORALLY DISINTEGRATING ORAL
Qty: 30 TAB | Refills: 0 | Status: SHIPPED | OUTPATIENT
Start: 2017-11-16 | End: 2018-10-03 | Stop reason: SDUPTHER

## 2017-11-16 NOTE — PROGRESS NOTES
Hellen Thao is a 60 y.o. male presenting today for Vomiting (x 1 ) and Diarrhea  . HPI:  Hellen Thao presents to the office today for vomiting and diarrhea x 1 episode. The patient denies any fever or abdominal cramping. Review of Systems   Constitutional: Negative for chills and fever. Respiratory: Negative for cough. Cardiovascular: Negative for chest pain and palpitations. Gastrointestinal: Positive for diarrhea, nausea and vomiting. Negative for abdominal pain. No Known Allergies    Current Outpatient Prescriptions   Medication Sig Dispense Refill    ondansetron (ZOFRAN ODT) 4 mg disintegrating tablet Take 1 Tab by mouth every eight (8) hours as needed for Nausea. 30 Tab 0    ferrous sulfate 300 mg (60 mg iron)/5 mL syrup TAKE ONE TEASPOONFUL (300MG) BY MOUTH TWICE DAILY 500 mL 3    carvedilol (COREG) 3.125 mg tablet TAKE 1 TABLET BY MOUTH TWICE DAILY WITH MEALS (BKFST & SUPPER) FOR HTN 60 Tab 0    hydroCHLOROthiazide (HYDRODIURIL) 25 mg tablet TAKE 1 TABLET BY MOUTH ONCE DAILY 30 Tab 0    sucralfate (CARAFATE) 1 gram tablet TAKE 1 TABLET BY MOUTH FOUR TIMES DAILY AFTER MEALS AND AT BEDTIME FOR GERD 120 Tab 0    potassium chloride (KLOR-CON) 10 mEq tablet TAKE 1 TABLET BY MOUTH ONCE DAILY FOR LOW BLOOD LEVELS (MAY CRUSH ANDTAKE WITH APPLESAUCE) 30 Tab 3    ergocalciferol (ERGOCALCIFEROL) 50,000 unit capsule TAKE 1 CAPSULE BY MOUTH EVERY 7 DAYS 4 Cap 3    senna-docusate (PERICOLACE) 8.6-50 mg per tablet Take 1 Tab by mouth two (2) times a day. 180 Tab 0    VITAMIN B-12 2,500 mcg sublingual tablet TAKE 1 TABLET BY MOUTH ONCE DAILY 110 Tab 0    enalapril (VASOTEC) 10 mg tablet Take 1 Tab by mouth two (2) times a day. 30 Tab 0    docosanol (ABREVA) 10 % topical cream Thin film 2 g 0    multivit with min-FA-lycopene (ONE-A-DAY MEN'S MULTIVITAMIN) 400-300 mcg tab Take 1 Each by mouth daily. 90 Each 3    raNITIdine (ZANTAC) 150 mg tablet Take 1 Tab by mouth two (2) times a day.  [address] [place] [address] Tab 2    simvastatin (ZOCOR) 20 mg tablet Take 1 Tab by mouth nightly. 90 Tab 3    sorbitol 70 % solution TAKE 30ML BY MOUTH TWICE DAILY FOR LAXATIVE 473 mL PRN    Swab (TOOTHETTE) swab Sig: Use as needed 600 Each 3    hydroCHLOROthiazide (HYDRODIURIL) 25 mg tablet TAKE 1 TABLET BY MOUTH ONCE DAILY 30 Tab 0    hydroCHLOROthiazide (HYDRODIURIL) 25 mg tablet Take 1 Tab by mouth daily. 30 Tab 0    acetaminophen (TYLENOL) 325 mg tablet 325 mg.  therapeutic multivitamin-minerals (THERAGRAN-M) tablet Take 1 Tab by mouth daily. 27 Tab 0       Past Medical History:   Diagnosis Date    Constipation, chronic     Dyslipidemia 4/8/2014    Femur fracture ([address] 7/7/16    brace at right femur    GERD (gastroesophageal reflux disease)     Hypertension     MR (mental retardation), severe     Muscle weakness of lower extremity     with spasticity    Urinary incontinence due to cognitive impairment 8/23/2012       History reviewed. No pertinent surgical history. Social History     Social History    Marital status: SINGLE     Spouse name: N/A    Number of children: N/A    Years of education: N/A     Occupational History    Not on file. Social History Main Topics    Smoking status: Never Smoker    Smokeless tobacco: Never Used    Alcohol use No    Drug use: No    Sexual activity: No     Other Topics Concern    Not on file     Social History Narrative       Patient does not have an advanced directive on file    Vitals:    11/16/17 1407   BP: 122/76   Pulse: 69   Resp: 16   Temp: 97.6 °F (36.4 °C)   TempSrc: Tympanic   SpO2: 98%   Height: 4' 10\" (1.473 m)   PainSc:   0 - No pain       Physical Exam   Constitutional: No distress. Cardiovascular: Normal rate, regular rhythm and normal heart sounds. Pulmonary/Chest: Effort normal and breath sounds normal.   Abdominal: Soft. Bowel sounds are normal. He exhibits no distension. Nursing note and vitals reviewed.       Hospital Outpatient Visit on 08/28/2017   Component Date Value Ref Range Status    Ferritin 08/28/2017 17  8 - 388 NG/ML Final    WBC 08/28/2017 4.8  4.6 - 13.2 K/uL Final    RBC 08/28/2017 4.93  4.70 - 5.50 M/uL Final    HGB 08/28/2017 13.3  13.0 - 16.0 g/dL Final    HCT 08/28/2017 42.1  36.0 - 48.0 % Final    MCV 08/28/2017 85.4  74.0 - 97.0 FL Final    MCH 08/28/2017 27.0  24.0 - 34.0 PG Final    MCHC 08/28/2017 31.6  31.0 - 37.0 g/dL Final    RDW 08/28/2017 16.4* 11.6 - 14.5 % Final    PLATELET 40/05/8705 356  135 - 420 K/uL Final    MPV 08/28/2017 11.8  9.2 - 11.8 FL Final    NEUTROPHILS 08/28/2017 69  40 - 73 % Final    LYMPHOCYTES 08/28/2017 20* 21 - 52 % Final    MONOCYTES 08/28/2017 8  3 - 10 % Final    EOSINOPHILS 08/28/2017 3  0 - 5 % Final    BASOPHILS 08/28/2017 0  0 - 2 % Final    ABS. NEUTROPHILS 08/28/2017 3.3  1.8 - 8.0 K/UL Final    ABS. LYMPHOCYTES 08/28/2017 1.0  0.9 - 3.6 K/UL Final    ABS. MONOCYTES 08/28/2017 0.4  0.05 - 1.2 K/UL Final    ABS. EOSINOPHILS 08/28/2017 0.2  0.0 - 0.4 K/UL Final    ABS. BASOPHILS 08/28/2017 0.0  0.0 - 0.06 K/UL Final    DF 08/28/2017 AUTOMATED    Final    Vitamin D 25-Hydroxy 08/28/2017 110.6* 30 - 100 ng/mL Final    Comment: (NOTE)  Deficiency               <20 ng/mL  Insufficiency          20-30 ng/mL  Sufficient             ng/mL  Possible toxicity       >100 ng/mL    The Method used is Siemens Advia Centaur currently standardized to a   Center of Disease Control and Prevention (CDC) certified reference   [address]. Samples containing fluorescein dye can produce falsely   elevated values when tested with the ADVIA Centaur Vitamin D Assay. It is recommended that results in the toxic range, >100 ng/mL, be   retested 72 hours post fluorescein exposure. .No results found for any visits on 11/16/17. Assessment / Plan:      ICD-10-CM ICD-9-CM    1. Viral gastroenteritis A08.4 008.8 ondansetron (ZOFRAN ODT) 4 mg disintegrating tablet   2.  Essential hypertension I10 401.9      Symptoms probably viral  Lite Diet- Bananas, rice, apple sauce, toast  Zofran rx  HTN- controlled  F/u prn      Follow-up Disposition:  Return if symptoms worsen or fail to improve. I asked the patient if he  had any questions and answered his  questions.   The patient stated that he understands the treatment plan and agrees with the treatment plan

## 2017-12-08 RX ORDER — RANITIDINE 150 MG/1
TABLET, FILM COATED ORAL
Qty: 60 TAB | Refills: 0 | Status: SHIPPED | OUTPATIENT
Start: 2017-12-08 | End: 2017-12-28 | Stop reason: SDUPTHER

## 2017-12-28 DIAGNOSIS — K59.09 CONSTIPATION, CHRONIC: ICD-10-CM

## 2017-12-28 RX ORDER — RANITIDINE 150 MG/1
TABLET, FILM COATED ORAL
Qty: 60 TAB | Refills: 0 | Status: SHIPPED | OUTPATIENT
Start: 2017-12-28 | End: 2018-02-22 | Stop reason: SDUPTHER

## 2017-12-28 RX ORDER — CARVEDILOL 3.12 MG/1
TABLET ORAL
Qty: 60 TAB | Refills: 0 | Status: SHIPPED | OUTPATIENT
Start: 2017-12-28 | End: 2018-02-22 | Stop reason: SDUPTHER

## 2017-12-28 RX ORDER — SUCRALFATE 1 G/1
TABLET ORAL
Qty: 120 TAB | Refills: 0 | Status: SHIPPED | OUTPATIENT
Start: 2017-12-28 | End: 2018-02-22 | Stop reason: SDUPTHER

## 2017-12-28 RX ORDER — SENNOSIDES, DOCUSATE SODIUM 50; 8.6 MG/1; MG/1
TABLET, FILM COATED ORAL
Qty: 60 TAB | Refills: 0 | Status: SHIPPED | OUTPATIENT
Start: 2017-12-28 | End: 2018-02-22 | Stop reason: SDUPTHER

## 2017-12-28 RX ORDER — HYDROCHLOROTHIAZIDE 25 MG/1
TABLET ORAL
Qty: 30 TAB | Refills: 0 | Status: SHIPPED | OUTPATIENT
Start: 2017-12-28 | End: 2018-10-03

## 2018-01-19 ENCOUNTER — TELEPHONE (OUTPATIENT)
Dept: FAMILY MEDICINE CLINIC | Age: 63
End: 2018-01-19

## 2018-01-19 NOTE — TELEPHONE ENCOUNTER
PT group home called stating he missed his prescription because it wasn't brought in to the home in time also states the pt is doing ok.

## 2018-01-23 NOTE — TELEPHONE ENCOUNTER
I called Group home in regards to medications. Per Nurse all medications were refilled and sent to the pharmacy.

## 2018-02-26 ENCOUNTER — TELEPHONE (OUTPATIENT)
Dept: FAMILY MEDICINE CLINIC | Age: 63
End: 2018-02-26

## 2018-02-26 NOTE — TELEPHONE ENCOUNTER
[facility] called to ask if order were signed for pts supplie faxed over on 2/23/18.    Phone [phone]

## 2018-02-27 NOTE — TELEPHONE ENCOUNTER
I called [facility] in regards to form. None has been received. Per Julianna España she will refax form to my attention.

## 2018-03-02 NOTE — TELEPHONE ENCOUNTER
Matt Pino with [facility] request status:    Was form received for incontinent supplies that was re faxed 02/27/2018

## 2018-04-19 DIAGNOSIS — E78.5 DYSLIPIDEMIA: ICD-10-CM

## 2018-04-19 DIAGNOSIS — E87.6 HYPOKALEMIA: ICD-10-CM

## 2018-04-19 RX ORDER — MULTIVIT-MIN/FOLIC/VIT K/LYCOP 400-300MCG
TABLET ORAL
Qty: 30 TAB | Refills: 0 | Status: SHIPPED | OUTPATIENT
Start: 2018-04-19 | End: 2018-06-15 | Stop reason: SDUPTHER

## 2018-04-19 RX ORDER — CARVEDILOL 3.12 MG/1
TABLET ORAL
Qty: 60 TAB | Refills: 0 | Status: SHIPPED | OUTPATIENT
Start: 2018-04-19 | End: 2018-06-15 | Stop reason: SDUPTHER

## 2018-04-19 RX ORDER — POTASSIUM CHLORIDE 750 MG/1
TABLET, EXTENDED RELEASE ORAL
Qty: 30 TAB | Refills: 0 | Status: SHIPPED | OUTPATIENT
Start: 2018-04-19 | End: 2018-06-15 | Stop reason: SDUPTHER

## 2018-04-19 RX ORDER — HYDROCHLOROTHIAZIDE 25 MG/1
TABLET ORAL
Qty: 30 TAB | Refills: 0 | Status: SHIPPED | OUTPATIENT
Start: 2018-04-19 | End: 2018-09-14

## 2018-04-19 RX ORDER — SIMVASTATIN 20 MG/1
TABLET, FILM COATED ORAL
Qty: 30 TAB | Refills: 0 | Status: SHIPPED | OUTPATIENT
Start: 2018-04-19 | End: 2018-06-15 | Stop reason: SDUPTHER

## 2018-04-19 RX ORDER — ERGOCALCIFEROL 1.25 MG/1
CAPSULE ORAL
Qty: 4 CAP | Refills: 0 | Status: SHIPPED | OUTPATIENT
Start: 2018-04-19 | End: 2018-04-19

## 2018-04-19 RX ORDER — SUCRALFATE 1 G/1
TABLET ORAL
Qty: 120 TAB | Refills: 0 | Status: SHIPPED | OUTPATIENT
Start: 2018-04-19 | End: 2021-02-06

## 2018-04-19 NOTE — TELEPHONE ENCOUNTER
Meds refilled and sent to pharmacy. Please call and notify group home to stop giving pt Vit D 50,000 units Q week which has been discontinued by me, as his last checked Vit D was high at 110.

## 2018-05-03 ENCOUNTER — OFFICE VISIT (OUTPATIENT)
Dept: FAMILY MEDICINE CLINIC | Age: 63
End: 2018-05-03

## 2018-05-03 VITALS
SYSTOLIC BLOOD PRESSURE: 118 MMHG | TEMPERATURE: 97.6 F | HEART RATE: 55 BPM | RESPIRATION RATE: 18 BRPM | DIASTOLIC BLOOD PRESSURE: 77 MMHG | HEIGHT: 60 IN | OXYGEN SATURATION: 95 % | BODY MASS INDEX: 18.46 KG/M2 | WEIGHT: 94 LBS

## 2018-05-03 DIAGNOSIS — E78.5 DYSLIPIDEMIA: ICD-10-CM

## 2018-05-03 DIAGNOSIS — I10 ESSENTIAL HYPERTENSION: ICD-10-CM

## 2018-05-03 DIAGNOSIS — Z00.00 MEDICARE ANNUAL WELLNESS VISIT, SUBSEQUENT: Primary | ICD-10-CM

## 2018-05-03 DIAGNOSIS — E55.9 VITAMIN D DEFICIENCY: ICD-10-CM

## 2018-05-03 DIAGNOSIS — Z71.89 ADVANCE CARE PLANNING: ICD-10-CM

## 2018-05-03 DIAGNOSIS — Z79.899 ENCOUNTER FOR LONG-TERM (CURRENT) USE OF MEDICATIONS: ICD-10-CM

## 2018-05-03 DIAGNOSIS — K21.9 GASTROESOPHAGEAL REFLUX DISEASE WITHOUT ESOPHAGITIS: ICD-10-CM

## 2018-05-03 RX ORDER — SUCRALFATE 1 G/10ML
1 SUSPENSION ORAL 4 TIMES DAILY
Qty: 414 ML | Refills: 3 | Status: SHIPPED | OUTPATIENT
Start: 2018-05-03 | End: 2018-06-22 | Stop reason: SDUPTHER

## 2018-05-03 NOTE — PROGRESS NOTES
Princess Pearson is a  60 y.o. male presents today for office visit for [address]    1. Have you been to the ER, urgent care clinic or hospitalized since your last visit? NO     2. Have you seen or consulted any other health care providers outside of the [address] [place] [address] since your last visit (Include any pap smears or colon screening)?  No

## 2018-05-03 NOTE — MR AVS SNAPSHOT
[address] [place] [address] 
 
 
 [address] [place], [place] [address] [facility] [address] 
[phone] Patient: Eduar Matthews MRN: [id number] Visit Information Date & Time Provider Department Dept. Phone Encounter #  
 5/3/2018 10:45  [address] [id number] Follow-up Instructions Return in about 3 months (around 8/3/2018) for Labs 1 week before. Upcoming Health Maintenance Date Due ZOSTER VACCINE AGE 60> 6/13/2014 MEDICARE YEARLY EXAM 3/14/2018 Influenza Age 5 to Adult 8/1/2018 DTaP/Tdap/Td series (2 - Td) 3/14/2022 Allergies as of 5/3/2018  Review Complete On: 5/3/2018 By: Sandra Sierra No Known Allergies Current Immunizations  Reviewed on 8/29/2016 Name Date Influenza Vaccine 10/2/2014 Influenza Vaccine [place]) 8/29/2016 Influenza Vaccine (Quad) PF 9/30/2015 TDAP Vaccine 3/14/2012 Not reviewed this visit You Were Diagnosed With   
  
 Codes Comments Medicare annual wellness visit, subsequent    -  Primary ICD-10-CM: Z00.00 ICD-9-CM: V70.0 Advance care planning     ICD-10-CM: Z71.89 ICD-9-CM: V65.49 Essential hypertension     ICD-10-CM: I10 
ICD-9-CM: 401.9 Dyslipidemia     ICD-10-CM: E78.5 ICD-9-CM: 272.4 Vitamin D deficiency     ICD-10-CM: E55.9 ICD-9-CM: 268.9 Gastroesophageal reflux disease without esophagitis     ICD-10-CM: K21.9 ICD-9-CM: 530.81 Encounter for long-term (current) use of medications     ICD-10-CM: Z79.899 ICD-9-CM: V58.69 Vitals BP Pulse Temp Resp Height(growth percentile) Weight(growth percentile) 118/77 (BP 1 Location: Right arm, BP Patient Position: Sitting) (!) 55 97.6 °F (36.4 °C) (Oral) 18 4' 10\" (1.473 m) 94 lb (42.6 kg) SpO2 BMI Smoking Status 95% 19.65 kg/m2 Never Smoker Vitals History BMI and BSA Data Body Mass Index Body Surface Area  19.65 kg/m 2 1.32 m 2  
  
 Preferred Pharmacy Pharmacy Name Phone [facility] [address] 2/3 [phone] Your Updated Medication List  
  
   
This list is accurate as of 5/3/18 11:39 AM.  Always use your most recent med list.  
  
  
  
  
 acetaminophen 325 mg tablet Commonly known as:  TYLENOL  
325 mg.  
  
 carvedilol 3.125 mg tablet Commonly known as:  COREG  
TAKE 1 TABLET BY MOUTH TWICE DAILY WITH MEALS (BKFST & SUPPER) FOR HTN  
  
 docosanol 10 % topical cream  
Commonly known as:  ABREVA Thin film DOK PLUS 8.6-50 mg per tablet Generic drug:  senna-docusate TAKE 1 TABLET BY MOUTH TWICE DAILY FOR CHRONIC CONSTIPATION  
  
 * enalapril 10 mg tablet Commonly known as:  Bethany Cram Take 1 Tab by mouth two (2) times a day. * enalapril 10 mg tablet Commonly known as:  VASOTEC  
TAKE 1 TABLET BY MOUTH TWICE DAILY FOR HTN  
  
 * enalapril 10 mg tablet Commonly known as:  VASOTEC  
TAKE 1 TABLET BY MOUTH TWICE DAILY FOR HTN  
  
 * ferrous sulfate 300 mg (60 mg iron)/5 mL syrup TAKE ONE TEASPOONFUL (300MG) BY MOUTH TWICE DAILY * ferrous sulfate 220 mg (44 mg iron)/5 mL solution TAKE ONE TEASPOONFUL (220MG) BY MOUTH TWICE DAILY * hydroCHLOROthiazide 25 mg tablet Commonly known as:  HYDRODIURIL Take 1 Tab by mouth daily. * hydroCHLOROthiazide 25 mg tablet Commonly known as:  HYDRODIURIL  
TAKE 1 TABLET BY MOUTH ONCE DAILY * hydroCHLOROthiazide 25 mg tablet Commonly known as:  HYDRODIURIL  
TAKE 1 TABLET BY MOUTH ONCE DAILY  
  
 ondansetron 4 mg disintegrating tablet Commonly known as:  ZOFRAN ODT Take 1 Tab by mouth every eight (8) hours as needed for Nausea. ONE-A-DAY MEN'S MULTIVITAMIN 400- mcg Tab Generic drug:  multivit-min-folic-vit K-lycop TAKE 1 TABLET BY MOUTH ONCE DAILY potassium chloride 10 mEq tablet Commonly known as:  KLOR-CON  
TAKE 1 TABLET BY MOUTH ONCE DAILY FOR LOW BLOOD LEVELS (MAY CRUSH ANDTAKE WITH APPLESAUCE)  
  
 raNITIdine 150 mg tablet Commonly known as:  ZANTAC TAKE 1 TABLET BY MOUTH TWICE DAILY  
  
 simvastatin 20 mg tablet Commonly known as:  ZOCOR  
TAKE 1 TABLET BY MOUTH NIGHTLY FOR DYSLIPIDEMIA  
  
 sorbitol 70 % solution TAKE 30ML BY MOUTH TWICE DAILY FOR LAXATIVE  
  
 sucralfate 1 gram tablet Commonly known as:  CARAFATE  
TAKE 1 TABLET BY MOUTH FOUR TIMES DAILY AFTER MEALS AND AT BEDTIME FOR GERD Swab Swab Commonly known as:  TOOTHETTE Sig: Use as needed  
  
 therapeutic multivitamin-minerals tablet Commonly known as:  THERAGRAN-M Take 1 Tab by mouth daily. VITAMIN B-12 2,500 mcg sublingual tablet Generic drug:  cyanocobalamin TAKE 1 TABLET BY MOUTH ONCE DAILY * Notice: This list has 11 medication(s) that are the same as other medications prescribed for you. Read the directions carefully, and ask your doctor or other care provider to review them with you. Follow-up Instructions Return in about 3 months (around 8/3/2018) for Labs 1 week before. To-Do List   
 08/01/2018 Lab:  CBC WITH AUTOMATED DIFF   
  
 08/01/2018 Lab:  HEMOGLOBIN A1C W/O EAG   
  
 08/01/2018 Lab:  LIPID PANEL   
  
 08/01/2018 Lab:  METABOLIC PANEL, COMPREHENSIVE   
  
 08/01/2018 Lab:  T4, FREE   
  
 08/01/2018 Lab:  TSH 3RD GENERATION   
  
 08/01/2018 Lab:  VITAMIN D, 25 HYDROXY Patient Instructions DASH Diet: Care Instructions Your Care Instructions The DASH diet is an eating plan that can help lower your blood pressure. DASH stands for Dietary Approaches to Stop Hypertension. Hypertension is high blood pressure. The DASH diet focuses on eating foods that are high in calcium, potassium, and magnesium. These nutrients can lower blood pressure. The foods that are highest in these nutrients are fruits, vegetables, low-fat dairy products, nuts, seeds, and legumes. But taking calcium, potassium, and magnesium supplements instead of eating foods that are high in those nutrients does not have the same effect. The DASH diet also includes whole grains, fish, and poultry. The DASH diet is one of several lifestyle changes your doctor may recommend to lower your high blood pressure. Your doctor may also want you to decrease the amount of sodium in your diet. Lowering sodium while following the DASH diet can lower blood pressure even further than just the DASH diet alone. Follow-up care is a key part of your treatment and safety. Be sure to make and go to all appointments, and call your doctor if you are having problems. It's also a good idea to know your test results and keep a list of the medicines you take. How can you care for yourself at home? Following the DASH diet · Eat 4 to 5 servings of fruit each day. A serving is 1 medium-sized piece of fruit, ½ cup chopped or canned fruit, 1/4 cup dried fruit, or 4 ounces (½ cup) of fruit juice. Choose fruit more often than fruit juice. · Eat 4 to 5 servings of vegetables each day. A serving is 1 cup of lettuce or raw leafy vegetables, ½ cup of chopped or cooked vegetables, or 4 ounces (½ cup) of vegetable juice. Choose vegetables more often than vegetable juice. · Get 2 to 3 servings of low-fat and fat-free dairy each day. A serving is 8 ounces of milk, 1 cup of yogurt, or 1 ½ ounces of cheese. · Eat 6 to 8 servings of grains each day.  A serving is 1 slice of bread, 1 ounce of dry cereal, or ½ cup of cooked rice, pasta, or cooked cereal. Try to choose whole-grain products as much as possible. · Limit lean meat, poultry, and fish to 2 servings each day. A serving is 3 ounces, about the size of a deck of cards. · Eat 4 to 5 servings of nuts, seeds, and legumes (cooked dried beans, lentils, and split peas) each week. A serving is 1/3 cup of nuts, 2 tablespoons of seeds, or ½ cup of cooked beans or peas. · Limit fats and oils to 2 to 3 servings each day. A serving is 1 teaspoon of vegetable oil or 2 tablespoons of salad dressing. · Limit sweets and added sugars to 5 servings or less a week. A serving is 1 tablespoon jelly or jam, ½ cup sorbet, or 1 cup of lemonade. · Eat less than 2,300 milligrams (mg) of sodium a day. If you limit your sodium to 1,500 mg a day, you can lower your blood pressure even more. Tips for success · Start small. Do not try to make dramatic changes to your diet all at once. You might feel that you are missing out on your favorite foods and then be more likely to not follow the plan. Make small changes, and stick with them. Once those changes become habit, add a few more changes. · Try some of the following: ¨ Make it a goal to eat a fruit or vegetable at every meal and at snacks. This will make it easy to get the recommended amount of fruits and vegetables each day. ¨ Try yogurt topped with fruit and nuts for a snack or healthy dessert. ¨ Add lettuce, tomato, cucumber, and onion to sandwiches. ¨ Combine a ready-made pizza crust with low-fat mozzarella cheese and lots of vegetable toppings. Try using tomatoes, squash, spinach, broccoli, carrots, cauliflower, and onions. ¨ Have a variety of cut-up vegetables with a low-fat dip as an appetizer instead of chips and dip. ¨ Sprinkle sunflower seeds or chopped almonds over salads. Or try adding chopped walnuts or almonds to cooked vegetables. ¨ Try some vegetarian meals using beans and peas.  Add garbanzo or kidney beans to salads. Make burritos and tacos with mashed vásquez beans or black beans. Where can you learn more? Go to http://haven-magdi.info/. Enter F542 in the search box to learn more about \"DASH Diet: Care Instructions. \" Current as of: September 21, 2016 Content Version: 11.4 © 9813-2180 [employer]. Care instructions adapted under license by [employer] (which disclaims liability or warranty for this information). If you have questions about a medical condition or this instruction, always ask your healthcare professional. [place] [address] any warranty or liability for your use of this information. Well Visit, Over 72: Care Instructions Your Care Instructions Physical exams can help you stay healthy. Your doctor has checked your overall health and may have suggested ways to take good care of yourself. He or she also may have recommended tests. At home, you can help prevent illness with healthy eating, regular exercise, and other steps. Follow-up care is a key part of your treatment and safety. Be sure to make and go to all appointments, and call your doctor if you are having problems. It's also a good idea to know your test results and keep a list of the medicines you take. How can you care for yourself at home? · Reach and stay at a healthy weight. This will lower your risk for many problems, such as obesity, diabetes, heart disease, and high blood pressure. · Get at least 30 minutes of exercise on most days of the week. Walking is a good choice. You also may want to do other activities, such as running, swimming, cycling, or playing tennis or team sports. · Do not smoke. Smoking can make health problems worse. If you need help quitting, talk to your doctor about stop-smoking programs and medicines. These can increase your chances of quitting for good. · Protect your skin from too much sun. When you're outdoors from 10 a.m. to 4 p.m., stay in the shade or cover up with clothing and a hat with a wide brim. Wear sunglasses that block UV rays. Even when it's cloudy, put broad-spectrum sunscreen (SPF 30 or higher) on any exposed skin. · See a dentist one or two times a year for checkups and to have your teeth cleaned. · Wear a seat belt in the car. · Limit alcohol to 2 drinks a day for men and 1 drink a day for women. Too much alcohol can cause health problems. Follow your doctor's advice about when to have certain tests. These tests can spot problems early. For men and women · Cholesterol. Your doctor will tell you how often to have this done based on your overall health and other things that can increase your risk for heart attack and stroke. · Blood pressure. Have your blood pressure checked during a routine doctor visit. Your doctor will tell you how often to check your blood pressure based on your age, your blood pressure results, and other factors. · Diabetes. Ask your doctor whether you should have tests for diabetes. · Vision. Experts recommend that you have yearly exams for glaucoma and other age-related eye problems. · Hearing. Tell your doctor if you notice any change in your hearing. You can have tests to find out how well you hear. · Colon cancer tests. Keep having colon cancer tests as your doctor recommends. You can have one of several types of tests. · Heart attack and stroke risk. At least every 4 to 6 years, you should have your risk for heart attack and stroke assessed. Your doctor uses factors such as your age, blood pressure, cholesterol, and whether you smoke or have diabetes to show what your risk for a heart attack or stroke is over the next 10 years. · Osteoporosis. Talk to your doctor about whether you should have a bone density test to find out whether you have thinning bones. Also ask your doctor about whether you should take calcium and vitamin D supplements. For women · Pap test and pelvic exam. You may no longer need a Pap test. Talk with your doctor about whether to stop or continue to have Pap tests. · Breast exam and mammogram. Ask how often you should have a mammogram, which is an X-ray of your breasts. A mammogram can spot breast cancer before it can be felt and when it is easiest to treat. · Thyroid disease. Talk to your doctor about whether to have your thyroid checked as part of a regular physical exam. Women have an increased chance of a thyroid problem. For men · Prostate exam. Talk to your doctor about whether you should have a blood test (called a PSA test) for prostate cancer. Experts disagree on whether men should have this test. Some experts recommend that you discuss the benefits and risks of the test with your doctor. · Abdominal aortic aneurysm. Ask your doctor whether you should have a test to check for an aneurysm. You may need a test if you ever smoked or if your parent, brother, sister, or child has had an aneurysm. When should you call for help? Watch closely for changes in your health, and be sure to contact your doctor if you have any problems or symptoms that concern you. Where can you learn more? Go to http://haven-magdi.info/. Enter F535 in the search box to learn more about \"Well Visit, Over 65: Care Instructions. \" Current as of: May 12, 2017 Content Version: 11.4 © 0214-2611 [employer]. Care instructions adapted under license by [employer] (which disclaims liability or warranty for this information). If you have questions about a medical condition or this instruction, always ask your healthcare professional. [place] [address] any warranty or liability for your use of this information. Introducing [facility]!    
 Aniya Gaffney introduces [employer] patient portal. Now you can access parts of your medical record, email your doctor's office, and request medication refills online. 1. In your internet browser, go to [email or web address] 2. Click on the First Time User? Click Here link in the Sign In box. You will see the New Member Sign Up page. 3. Enter your [employer] Access Code exactly as it appears below. You will not need to use this code after youve completed the sign-up process. If you do not sign up before the expiration date, you must request a new code. · [employer] Access Code: [email or web address] Expires: 8/1/2018 11:39 AM 
 
4. Enter the last four digits of your Social Security Number (xxxx) and Date of Birth (mm/dd/yyyy) as indicated and click Submit. You will be taken to the next sign-up page. 5. Create a [employer]Wise ID. This will be your [employer] login ID and cannot be changed, so think of one that is secure and easy to remember. 6. Create a [employer] password. You can change your password at any time. 7. Enter your Password Reset Question and Answer. This can be used at a later time if you forget your password. 8. Enter your e-mail address. You will receive e-mail notification when new information is available in [address] 9. Click Sign Up. You can now view and download portions of your medical record. 10. Click the Download Summary menu link to download a portable copy of your medical information. If you have questions, please visit the Frequently Asked Questions section of the [employer] website. Remember, [employer] is NOT to be used for urgent needs. For medical emergencies, dial 911. Now available from your iPhone and Android! Please provide this summary of care documentation to your next provider. Your primary care clinician is listed as [address] If you have any questions after today's visit, please call [phone].

## 2018-05-03 NOTE — PATIENT INSTRUCTIONS
DASH Diet: Care Instructions  Your Care Instructions    The DASH diet is an eating plan that can help lower your blood pressure. DASH stands for Dietary Approaches to Stop Hypertension. Hypertension is high blood pressure. The DASH diet focuses on eating foods that are high in calcium, potassium, and magnesium. These nutrients can lower blood pressure. The foods that are highest in these nutrients are fruits, vegetables, low-fat dairy products, nuts, seeds, and legumes. But taking calcium, potassium, and magnesium supplements instead of eating foods that are high in those nutrients does not have the same effect. The DASH diet also includes whole grains, fish, and poultry. The DASH diet is one of several lifestyle changes your doctor may recommend to lower your high blood pressure. Your doctor may also want you to decrease the amount of sodium in your diet. Lowering sodium while following the DASH diet can lower blood pressure even further than just the DASH diet alone. Follow-up care is a key part of your treatment and safety. Be sure to make and go to all appointments, and call your doctor if you are having problems. It's also a good idea to know your test results and keep a list of the medicines you take. How can you care for yourself at home? Following the DASH diet  · Eat 4 to 5 servings of fruit each day. A serving is 1 medium-sized piece of fruit, ½ cup chopped or canned fruit, 1/4 cup dried fruit, or 4 ounces (½ cup) of fruit juice. Choose fruit more often than fruit juice. · Eat 4 to 5 servings of vegetables each day. A serving is 1 cup of lettuce or raw leafy vegetables, ½ cup of chopped or cooked vegetables, or 4 ounces (½ cup) of vegetable juice. Choose vegetables more often than vegetable juice. · Get 2 to 3 servings of low-fat and fat-free dairy each day. A serving is 8 ounces of milk, 1 cup of yogurt, or 1 ½ ounces of cheese. · Eat 6 to 8 servings of grains each day.  A serving is 1 slice of bread, 1 ounce of dry cereal, or ½ cup of cooked rice, pasta, or cooked cereal. Try to choose whole-grain products as much as possible. · Limit lean meat, poultry, and fish to 2 servings each day. A serving is 3 ounces, about the size of a deck of cards. · Eat 4 to 5 servings of nuts, seeds, and legumes (cooked dried beans, lentils, and split peas) each week. A serving is 1/3 cup of nuts, 2 tablespoons of seeds, or ½ cup of cooked beans or peas. · Limit fats and oils to 2 to 3 servings each day. A serving is 1 teaspoon of vegetable oil or 2 tablespoons of salad dressing. · Limit sweets and added sugars to 5 servings or less a week. A serving is 1 tablespoon jelly or jam, ½ cup sorbet, or 1 cup of lemonade. · Eat less than 2,300 milligrams (mg) of sodium a day. If you limit your sodium to 1,500 mg a day, you can lower your blood pressure even more. Tips for success  · Start small. Do not try to make dramatic changes to your diet all at once. You might feel that you are missing out on your favorite foods and then be more likely to not follow the plan. Make small changes, and stick with them. Once those changes become habit, add a few more changes. · Try some of the following:  ¨ Make it a goal to eat a fruit or vegetable at every meal and at snacks. This will make it easy to get the recommended amount of fruits and vegetables each day. ¨ Try yogurt topped with fruit and nuts for a snack or healthy dessert. ¨ Add lettuce, tomato, cucumber, and onion to sandwiches. ¨ Combine a ready-made pizza crust with low-fat mozzarella cheese and lots of vegetable toppings. Try using tomatoes, squash, spinach, broccoli, carrots, cauliflower, and onions. ¨ Have a variety of cut-up vegetables with a low-fat dip as an appetizer instead of chips and dip. ¨ Sprinkle sunflower seeds or chopped almonds over salads. Or try adding chopped walnuts or almonds to cooked vegetables.   ¨ Try some vegetarian meals using beans and peas. Add garbanzo or kidney beans to salads. Make burritos and tacos with mashed vásquez beans or black beans. Where can you learn more? Go to http://haven-magdi.info/. Enter T844 in the search box to learn more about \"DASH Diet: Care Instructions. \"  Current as of: September 21, 2016  Content Version: 11.4  © 9803-8164 [employer]. Care instructions adapted under license by [employer] (which disclaims liability or warranty for this information). If you have questions about a medical condition or this instruction, always ask your healthcare professional. [place] [address] any warranty or liability for your use of this information. Well Visit, Over 72: Care Instructions  Your Care Instructions    Physical exams can help you stay healthy. Your doctor has checked your overall health and may have suggested ways to take good care of yourself. He or she also may have recommended tests. At home, you can help prevent illness with healthy eating, regular exercise, and other steps. Follow-up care is a key part of your treatment and safety. Be sure to make and go to all appointments, and call your doctor if you are having problems. It's also a good idea to know your test results and keep a list of the medicines you take. How can you care for yourself at home? · Reach and stay at a healthy weight. This will lower your risk for many problems, such as obesity, diabetes, heart disease, and high blood pressure. · Get at least 30 minutes of exercise on most days of the week. Walking is a good choice. You also may want to do other activities, such as running, swimming, cycling, or playing tennis or team sports. · Do not smoke. Smoking can make health problems worse. If you need help quitting, talk to your doctor about stop-smoking programs and medicines. These can increase your chances of quitting for good. · Protect your skin from too much sun.  When you're outdoors from 10 a.m. to 4 p.m., stay in the shade or cover up with clothing and a hat with a wide brim. Wear sunglasses that block UV rays. Even when it's cloudy, put broad-spectrum sunscreen (SPF 30 or higher) on any exposed skin. · See a dentist one or two times a year for checkups and to have your teeth cleaned. · Wear a seat belt in the car. · Limit alcohol to 2 drinks a day for men and 1 drink a day for women. Too much alcohol can cause health problems. Follow your doctor's advice about when to have certain tests. These tests can spot problems early. For men and women  · Cholesterol. Your doctor will tell you how often to have this done based on your overall health and other things that can increase your risk for heart attack and stroke. · Blood pressure. Have your blood pressure checked during a routine doctor visit. Your doctor will tell you how often to check your blood pressure based on your age, your blood pressure results, and other factors. · Diabetes. Ask your doctor whether you should have tests for diabetes. · Vision. Experts recommend that you have yearly exams for glaucoma and other age-related eye problems. · Hearing. Tell your doctor if you notice any change in your hearing. You can have tests to find out how well you hear. · Colon cancer tests. Keep having colon cancer tests as your doctor recommends. You can have one of several types of tests. · Heart attack and stroke risk. At least every 4 to 6 years, you should have your risk for heart attack and stroke assessed. Your doctor uses factors such as your age, blood pressure, cholesterol, and whether you smoke or have diabetes to show what your risk for a heart attack or stroke is over the next 10 years. · Osteoporosis. Talk to your doctor about whether you should have a bone density test to find out whether you have thinning bones. Also ask your doctor about whether you should take calcium and vitamin D supplements.   For women  · Pap test and pelvic exam. You may no longer need a Pap test. Talk with your doctor about whether to stop or continue to have Pap tests. · Breast exam and mammogram. Ask how often you should have a mammogram, which is an X-ray of your breasts. A mammogram can spot breast cancer before it can be felt and when it is easiest to treat. · Thyroid disease. Talk to your doctor about whether to have your thyroid checked as part of a regular physical exam. Women have an increased chance of a thyroid problem. For men  · Prostate exam. Talk to your doctor about whether you should have a blood test (called a PSA test) for prostate cancer. Experts disagree on whether men should have this test. Some experts recommend that you discuss the benefits and risks of the test with your doctor. · Abdominal aortic aneurysm. Ask your doctor whether you should have a test to check for an aneurysm. You may need a test if you ever smoked or if your parent, brother, sister, or child has had an aneurysm. When should you call for help? Watch closely for changes in your health, and be sure to contact your doctor if you have any problems or symptoms that concern you. Where can you learn more? Go to http://haven-magdi.info/. Enter N969 in the search box to learn more about \"Well Visit, Over 65: Care Instructions. \"  Current as of: May 12, 2017  Content Version: 11.4  © 5253-5764 [employer]. Care instructions adapted under license by [employer] (which disclaims liability or warranty for this information). If you have questions about a medical condition or this instruction, always ask your healthcare professional. [place] [address] any warranty or liability for your use of this information.

## 2018-05-03 NOTE — PROGRESS NOTES
Chief Complaint   Patient presents with    Other     mwv       HPI:  Patient is a 60year old  male with medical problems listed below presents today Medicare Wellness Exam and for follow up of GERD, Hypertension, Hyperlipidemia, Vit D def, etc. He was accompanied by his  Brandee John stated that he has been feeling well with no significant complaints reported today. In addition, she also stated that he has been taking his medications with no adverse effects noted.         Past Medical History:   Diagnosis Date    Constipation, chronic     Dyslipidemia 4/8/2014    Femur fracture ([address] 7/7/16    brace at right femur    GERD (gastroesophageal reflux disease)     Hypertension     MR (mental retardation), severe     Muscle weakness of lower extremity     with spasticity    Urinary incontinence due to cognitive impairment 8/23/2012       No Known Allergies      Current Outpatient Prescriptions   Medication Sig Dispense Refill    sorbitol 70 % solution TAKE 30ML BY MOUTH TWICE DAILY FOR LAXATIVE 473 mL PRN    ferrous sulfate 220 mg (44 mg iron)/5 mL solution TAKE ONE TEASPOONFUL (220MG) BY MOUTH TWICE DAILY 473 mL 0    carvedilol (COREG) 3.125 mg tablet TAKE 1 TABLET BY MOUTH TWICE DAILY WITH MEALS (BKFST & SUPPER) FOR HTN 60 Tab 0    hydroCHLOROthiazide (HYDRODIURIL) 25 mg tablet TAKE 1 TABLET BY MOUTH ONCE DAILY 30 Tab 0    ONE-A-DAY MEN'S MULTIVITAMIN 400- mcg tab TAKE 1 TABLET BY MOUTH ONCE DAILY 30 Tab 0    potassium chloride (KLOR-CON) 10 mEq tablet TAKE 1 TABLET BY MOUTH ONCE DAILY FOR LOW BLOOD LEVELS (MAY CRUSH ANDTAKE WITH APPLESAUCE) 30 Tab 0    simvastatin (ZOCOR) 20 mg tablet TAKE 1 TABLET BY MOUTH NIGHTLY FOR DYSLIPIDEMIA 30 Tab 0    sucralfate (CARAFATE) 1 gram tablet TAKE 1 TABLET BY MOUTH FOUR TIMES DAILY AFTER MEALS AND AT BEDTIME FOR GERD 120 Tab 0    enalapril (VASOTEC) 10 mg tablet TAKE 1 TABLET BY MOUTH TWICE DAILY FOR HTN 60 Tab 0    raNITIdine (ZANTAC) 150 mg tablet TAKE 1 TABLET BY MOUTH TWICE DAILY 60 Tab 0    DOK PLUS 8.6-50 mg per tablet TAKE 1 TABLET BY MOUTH TWICE DAILY FOR CHRONIC CONSTIPATION 60 Tab 0    ferrous sulfate 300 mg (60 mg iron)/5 mL syrup TAKE ONE TEASPOONFUL (300MG) BY MOUTH TWICE DAILY 500 mL 0    enalapril (VASOTEC) 10 mg tablet TAKE 1 TABLET BY MOUTH TWICE DAILY FOR HTN 60 Tab 0    hydroCHLOROthiazide (HYDRODIURIL) 25 mg tablet TAKE 1 TABLET BY MOUTH ONCE DAILY 30 Tab 0    hydroCHLOROthiazide (HYDRODIURIL) 25 mg tablet TAKE 1 TABLET BY MOUTH ONCE DAILY 30 Tab 0    ondansetron (ZOFRAN ODT) 4 mg disintegrating tablet Take 1 Tab by mouth every eight (8) hours as needed for Nausea. 30 Tab 0    hydroCHLOROthiazide (HYDRODIURIL) 25 mg tablet TAKE 1 TABLET BY MOUTH ONCE DAILY 30 Tab 0    hydroCHLOROthiazide (HYDRODIURIL) 25 mg tablet TAKE 1 TABLET BY MOUTH ONCE DAILY 30 Tab 0    hydroCHLOROthiazide (HYDRODIURIL) 25 mg tablet Take 1 Tab by mouth daily. 30 Tab 0    VITAMIN B-12 2,500 mcg sublingual tablet TAKE 1 TABLET BY MOUTH ONCE DAILY 110 Tab 0    acetaminophen (TYLENOL) 325 mg tablet 325 mg.      enalapril (VASOTEC) 10 mg tablet Take 1 Tab by mouth two (2) times a day. 30 Tab 0    therapeutic multivitamin-minerals (THERAGRAN-M) tablet Take 1 Tab by mouth daily.  30 Tab 0    docosanol (ABREVA) 10 % topical cream Thin film 2 g 0    Swab (TOOTHETTE) swab Sig: Use as needed 600 Each 3          ROS:  Unable to review as pt mentally challenged        Physical Exam:  Visit Vitals    /77 (BP 1 Location: Right arm, BP Patient Position: Sitting)    Pulse (!) 55    Temp 97.6 °F (36.4 °C) (Oral)    Resp 18    Ht 4' 10\" (1.473 m)    Wt 94 lb (42.6 kg)  Comment: patient is confined tow wheelchair    SpO2 95%    BMI 19.65 kg/m2       General: Black male in wheelchair, sleeping, afebrile, in no acute distress  Respiratory: symmetrical chest expansion, lung sounds clear bilaterally, good air entry, good respiratory effort, no wheezes or crackles  Cardiovascular: normal S1S2, regular rate and rhythm, no murmurs, pulses palpable, no thrill, no carotid or abdominal bruits, no JVD  Abdomen: soft, non-distended, normoactive bowel sounds x 4 quadrants. Extremity: No edema noted      Assessment/Plan:    ICD-10-CM ICD-9-CM    1. Medicare annual wellness visit, subsequent Z00.00 V70.0 Medicare Annual Wellness Visit was completed at the office today. 2. Advance care planning Z71.89 V65.49    3. Essential hypertension I10 401.9 Controlled  CBC WITH AUTOMATED DIFF      METABOLIC PANEL, COMPREHENSIVE      T4, FREE      TSH 3RD GENERATION   4. Dyslipidemia E78.5 272.4 Continue Simvastatin  LIPID PANEL   5. Vitamin D deficiency E55.9 268.9 VITAMIN D, 25 HYDROXY   6. Gastroesophageal reflux disease without esophagitis K21.9 530.81 sucralfate (CARAFATE) 100 mg/mL suspension   7. Encounter for long-term (current) use of medications Z79.899 V58.69 HEMOGLOBIN A1C W/O EAG         Orders Placed This Encounter    CBC WITH AUTOMATED DIFF     Standing Status:   Future     Standing Expiration Date:   10/30/2018    HEMOGLOBIN A1C W/O EAG     Standing Status:   Future     Standing Expiration Date:   5/4/2019    LIPID PANEL     Standing Status:   Future     Standing Expiration Date:   41/91/8430    METABOLIC PANEL, COMPREHENSIVE     Standing Status:   Future     Standing Expiration Date:   10/30/2018    T4, FREE     Standing Status:   Future     Standing Expiration Date:   10/30/2018    TSH 3RD GENERATION     Standing Status:   Future     Standing Expiration Date:   8/31/2018    VITAMIN D, 25 HYDROXY     Standing Status:   Future     Standing Expiration Date:   10/30/2018    sucralfate (CARAFATE) 100 mg/mL suspension     Sig: Take 10 mL by mouth four (4) times daily.      Dispense:  414 mL     Refill:  3         Group home paperwork and physical form filled out today         Additional Notes: Discussed today's diagnosis, treatment plans. Discussed medication indications and side effects. After Visit Summary: Discussed provided printed patient instructions. Answered questions accordingly. Follow-up Disposition: In 3 months with labs 1 week prior        [address] Dawson Downey DO, MPH  Internal Medicine      This is the Subsequent Medicare Annual Wellness Exam, performed 12 months or more after the Initial AWV or the last Subsequent AWV    I have reviewed the patient's medical history in detail and updated the computerized patient record. History     Past Medical History:   Diagnosis Date    Constipation, chronic     Dyslipidemia 4/8/2014    Femur fracture ([address] 7/7/16    brace at right femur    GERD (gastroesophageal reflux disease)     Hypertension     MR (mental retardation), severe     Muscle weakness of lower extremity     with spasticity    Urinary incontinence due to cognitive impairment 8/23/2012      No past surgical history on file.   Current Outpatient Prescriptions   Medication Sig Dispense Refill    sorbitol 70 % solution TAKE 30ML BY MOUTH TWICE DAILY FOR LAXATIVE 473 mL PRN    ferrous sulfate 220 mg (44 mg iron)/5 mL solution TAKE ONE TEASPOONFUL (220MG) BY MOUTH TWICE DAILY 473 mL 0    carvedilol (COREG) 3.125 mg tablet TAKE 1 TABLET BY MOUTH TWICE DAILY WITH MEALS (BKFST & SUPPER) FOR HTN 60 Tab 0    hydroCHLOROthiazide (HYDRODIURIL) 25 mg tablet TAKE 1 TABLET BY MOUTH ONCE DAILY 30 Tab 0    ONE-A-DAY MEN'S MULTIVITAMIN 400- mcg tab TAKE 1 TABLET BY MOUTH ONCE DAILY 30 Tab 0    potassium chloride (KLOR-CON) 10 mEq tablet TAKE 1 TABLET BY MOUTH ONCE DAILY FOR LOW BLOOD LEVELS (MAY CRUSH ANDTAKE WITH APPLESAUCE) 30 Tab 0    simvastatin (ZOCOR) 20 mg tablet TAKE 1 TABLET BY MOUTH NIGHTLY FOR DYSLIPIDEMIA 30 Tab 0    sucralfate (CARAFATE) 1 gram tablet TAKE 1 TABLET BY MOUTH FOUR TIMES DAILY AFTER MEALS AND AT BEDTIME FOR GERD 120 Tab 0    enalapril (VASOTEC) 10 mg tablet TAKE 1 TABLET BY MOUTH TWICE DAILY FOR HTN 60 Tab 0    raNITIdine (ZANTAC) 150 mg tablet TAKE 1 TABLET BY MOUTH TWICE DAILY 60 Tab 0    DOK PLUS 8.6-50 mg per tablet TAKE 1 TABLET BY MOUTH TWICE DAILY FOR CHRONIC CONSTIPATION 60 Tab 0    ferrous sulfate 300 mg (60 mg iron)/5 mL syrup TAKE ONE TEASPOONFUL (300MG) BY MOUTH TWICE DAILY 500 mL 0    enalapril (VASOTEC) 10 mg tablet TAKE 1 TABLET BY MOUTH TWICE DAILY FOR HTN 60 Tab 0    hydroCHLOROthiazide (HYDRODIURIL) 25 mg tablet TAKE 1 TABLET BY MOUTH ONCE DAILY 30 Tab 0    hydroCHLOROthiazide (HYDRODIURIL) 25 mg tablet TAKE 1 TABLET BY MOUTH ONCE DAILY 30 Tab 0    ondansetron (ZOFRAN ODT) 4 mg disintegrating tablet Take 1 Tab by mouth every eight (8) hours as needed for Nausea. 30 Tab 0    hydroCHLOROthiazide (HYDRODIURIL) 25 mg tablet TAKE 1 TABLET BY MOUTH ONCE DAILY 30 Tab 0    hydroCHLOROthiazide (HYDRODIURIL) 25 mg tablet TAKE 1 TABLET BY MOUTH ONCE DAILY 30 Tab 0    hydroCHLOROthiazide (HYDRODIURIL) 25 mg tablet Take 1 Tab by mouth daily. 30 Tab 0    VITAMIN B-12 2,500 mcg sublingual tablet TAKE 1 TABLET BY MOUTH ONCE DAILY 110 Tab 0    acetaminophen (TYLENOL) 325 mg tablet 325 mg.      enalapril (VASOTEC) 10 mg tablet Take 1 Tab by mouth two (2) times a day. 30 Tab 0    therapeutic multivitamin-minerals (THERAGRAN-M) tablet Take 1 Tab by mouth daily.  30 Tab 0    docosanol (ABREVA) 10 % topical cream Thin film 2 g 0    Swab (TOOTHETTE) swab Sig: Use as needed 600 Each 3     No Known Allergies  Family History   Problem Relation Age of Onset    Family history unknown: Yes     Social History   Substance Use Topics    Smoking status: Never Smoker    Smokeless tobacco: Never Used    Alcohol use No     Patient Active Problem List   Diagnosis Code    Hypertension I10    Constipation, chronic K59.09    MR (mental retardation), severe F72    Muscle weakness of lower extremity M62.81    GERD (gastroesophageal reflux disease) K21.9    Urinary incontinence due to cognitive impairment R39.81    Hypokalemia E87.6    Dyslipidemia E78.5    Cough R05    Encounter for long-term (current) use of medications Z79.899    Cold sore B00.1    Laceration of mouth, internal S01.512A    Diarrhea R19.7    Need for zoster vaccination Z23    Acute blood loss anemia D62    GI bleeding K92.2   [facility] discharge follow-up Z09    Erythematous rash R21    Vomiting R11.10    Comminuted fracture of shaft of femur (HCC) S72.353A    Fracture, femur (HCC) S72.90XA    Abrasion of foot, right M77.737B    Need for influenza vaccination Z23    Vitamin D deficiency E55.9    Abrasion of knee, right Y28.395K    Medicare annual wellness visit, subsequent Z00.00    Prostate cancer screening Z12.5    Advance care planning Z71.89    Iron deficiency anemia D50.9    Anemia D64.9       Depression Risk Factor Screening:     PHQ over the last two weeks 3/9/2017   Little interest or pleasure in doing things Not at all   Feeling down, depressed or hopeless Not at all   Total Score PHQ 2 0     Alcohol Risk Factor Screening: You do not drink alcohol or very rarely. Functional Ability and Level of Safety:   Hearing Loss  Hearing is good. Activities of Daily Living  The home contains: no safety equipment. Patient needs help with:  transportation, shopping, preparing meals, laundry, housework, managing medications, managing money, eating, dressing, bathing, hygiene and bathroom needs    Fall Risk  No flowsheet data found.     Abuse Screen  Patient is not abused    Cognitive Screening   Evaluation of Cognitive Function:  Has your family/caregiver stated any concerns about your memory: no  Normal, Abnormal    Patient Care Team   Patient Care Team:  Salty Naqvi DO as PCP - General (Internal Medicine)    Assessment/Plan   Education and counseling provided:  Are appropriate based on today's review and evaluation  End-of-Life planning (with patient's consent)  Pneumococcal Vaccine - not age eligible - will wait until 71 as he does not have chronic lung disease nor is he technically immunocompromised  Influenza Vaccine - Flu shot given 8/29/16  Prostate cancer screening tests (PSA, covered annually) - PSA done 11/9/16 was 1.4  Colorectal cancer screening tests  Screening for glaucoma - none performed; will hold off as patient has MR and may be difficult to examine  Diabetes screening test  Diabetes outpatient self-management training services  T Dap given 3/14/2012           Diagnoses and all orders for this visit:    1. Medicare annual wellness visit, subsequent    2. Advance care planning    3. Essential hypertension    4. Dyslipidemia    5. Vitamin D deficiency    6.  Gastroesophageal reflux disease without esophagitis        Health Maintenance Due   Topic Date Due    ZOSTER VACCINE AGE 60>  06/13/2014    MEDICARE YEARLY EXAM  03/14/2018

## 2018-05-14 ENCOUNTER — TELEPHONE (OUTPATIENT)
Dept: FAMILY MEDICINE CLINIC | Age: 63
End: 2018-05-14

## 2018-05-18 ENCOUNTER — TELEPHONE (OUTPATIENT)
Dept: FAMILY MEDICINE CLINIC | Age: 63
End: 2018-05-18

## 2018-05-18 NOTE — TELEPHONE ENCOUNTER
A Form for Form was completed for a new custom wheel chair order for Dr Bernabe Pickens  leaving on nurse christiane desk please advise.

## 2018-05-21 NOTE — TELEPHONE ENCOUNTER
Shelia pts health manager at [facility] calling for status of form dropped off 05/18/2018    Stated Gerardo Santiago has faxed this form several times over the last several weeks and she also dropped it off 05/18/2018    It is for wheelchair replacement, and requires pcp to complete the form.  Stated pt currently using wheelchair that they have \"rigged\" to make useable, not the best situation\"    Please complete form as soon as possible, she is aware Dr Mila Burrell is back in the office 05/22/2018 and will call for status tomorrow     Request return call with status

## 2018-07-09 DIAGNOSIS — K21.9 GASTROESOPHAGEAL REFLUX DISEASE WITHOUT ESOPHAGITIS: ICD-10-CM

## 2018-07-09 NOTE — TELEPHONE ENCOUNTER
Requested Prescriptions     Pending Prescriptions Disp Refills    CARAFATE 100 mg/mL suspension [Pharmacy Med Name: CARAFATE 1 GM/10 ML SUSP] 840 mL 0     Sig: TAKE 10ML (1GM) BY MOUTH FOUR TIMES DAILY FOR GERD    Swab (TOOTHETTE) swab 600 Each 3     Sig: Sig: Use as needed        Also need Swab (TOOTHETTE) swab

## 2018-07-10 RX ORDER — SUCRALFATE 1 G/10ML
SUSPENSION ORAL
Qty: 840 ML | Refills: 0 | Status: SHIPPED | OUTPATIENT
Start: 2018-07-10 | End: 2018-07-10 | Stop reason: SDUPTHER

## 2018-07-11 NOTE — TELEPHONE ENCOUNTER
Called left name and call back number. The call was to inform patient that med refills was sent to the pharmacy.

## 2018-07-19 ENCOUNTER — TELEPHONE (OUTPATIENT)
Dept: FAMILY MEDICINE CLINIC | Age: 63
End: 2018-07-19

## 2018-08-10 DIAGNOSIS — E78.5 DYSLIPIDEMIA: ICD-10-CM

## 2018-08-10 DIAGNOSIS — E87.6 HYPOKALEMIA: ICD-10-CM

## 2018-08-10 RX ORDER — CARVEDILOL 3.12 MG/1
TABLET ORAL
Qty: 60 TAB | Refills: 0 | Status: SHIPPED | OUTPATIENT
Start: 2018-08-10 | End: 2018-10-01 | Stop reason: SDUPTHER

## 2018-08-10 RX ORDER — MULTIVIT-MIN/FOLIC/VIT K/LYCOP 400-300MCG
TABLET ORAL
Qty: 30 TAB | Refills: 0 | Status: SHIPPED | OUTPATIENT
Start: 2018-08-10 | End: 2018-10-01 | Stop reason: SDUPTHER

## 2018-08-10 RX ORDER — ERGOCALCIFEROL 1.25 MG/1
CAPSULE ORAL
Qty: 4 CAP | Refills: 0 | Status: SHIPPED | OUTPATIENT
Start: 2018-08-10 | End: 2018-09-14 | Stop reason: SDUPTHER

## 2018-08-10 RX ORDER — POTASSIUM CHLORIDE 750 MG/1
TABLET, EXTENDED RELEASE ORAL
Qty: 30 TAB | Refills: 0 | Status: SHIPPED | OUTPATIENT
Start: 2018-08-10 | End: 2018-10-01 | Stop reason: SDUPTHER

## 2018-08-10 RX ORDER — HYDROCHLOROTHIAZIDE 25 MG/1
TABLET ORAL
Qty: 28 TAB | Refills: 0 | Status: SHIPPED | OUTPATIENT
Start: 2018-08-10 | End: 2018-09-14

## 2018-08-10 RX ORDER — SIMVASTATIN 20 MG/1
TABLET, FILM COATED ORAL
Qty: 30 TAB | Refills: 0 | Status: SHIPPED | OUTPATIENT
Start: 2018-08-10 | End: 2018-10-01 | Stop reason: SDUPTHER

## 2018-09-12 ENCOUNTER — OFFICE VISIT (OUTPATIENT)
Dept: FAMILY MEDICINE CLINIC | Age: 63
End: 2018-09-12

## 2018-09-12 VITALS
HEIGHT: 60 IN | SYSTOLIC BLOOD PRESSURE: 116 MMHG | WEIGHT: 94 LBS | BODY MASS INDEX: 18.46 KG/M2 | RESPIRATION RATE: 20 BRPM | HEART RATE: 63 BPM | OXYGEN SATURATION: 98 % | TEMPERATURE: 97.7 F | DIASTOLIC BLOOD PRESSURE: 65 MMHG

## 2018-09-12 DIAGNOSIS — R79.89 INCREASED AMMONIA LEVEL: ICD-10-CM

## 2018-09-12 DIAGNOSIS — R19.5 DARK STOOLS: ICD-10-CM

## 2018-09-12 DIAGNOSIS — Z92.89 HISTORY OF RECENT HOSPITALIZATION: Primary | ICD-10-CM

## 2018-09-12 DIAGNOSIS — Z87.19 HISTORY OF FATTY INFILTRATION OF LIVER: ICD-10-CM

## 2018-09-12 NOTE — PATIENT INSTRUCTIONS
1.  Referral to [facility]  2. Follow up in 2-3 weeks with pcp  3. [facility] for assistance with bathing and transferring/rx paper rx             written/signed by Dr. Moshe Shah (SAMY): Care Instructions  Your Care Instructions    Nonalcoholic steatohepatitis (PABLO) is liver inflammation. It is caused by a buildup of fat in the liver. The fat buildup is not caused by drinking alcohol. Because of the inflammation, the liver does not work as well as it should. PABLO is part of a group of liver diseases called nonalcoholic fatty liver disease. In these diseases, fat builds up in the liver and sometimes causes liver damage. This damage can get worse over time. Follow-up care is a key part of your treatment and safety. Be sure to make and go to all appointments, and call your doctor if you are having problems. It's also a good idea to know your test results and keep a list of the medicines you take. How can you care for yourself at home? · Stay at a healthy weight. Or if you need to, slowly get to a healthy weight. · Control your cholesterol. Talk to your doctor about ways to lower your cholesterol, if needed. You might try getting active, taking medicines, and making healthy changes to your diet. · Eat healthy foods. This includes fruits, vegetables, lean meats and dairy, and whole grains. · If you have diabetes, keep your blood sugar at your target level. · Get at least 30 minutes of exercise on most days of the week. Walking is a good choice. You also may want to do other activities, such as running, swimming, cycling, or playing tennis or team sports. · Limit alcohol, or do not drink. Alcohol can damage the liver and cause health problems. When should you call for help? Call 911 anytime you think you may need emergency care.  For example, call if:    · You have trouble breathing.     · You vomit blood or what looks like coffee grounds.    Call your doctor now or seek immediate medical care if:    · You feel very sleepy or confused.     · You have new or worse belly pain.     · You have a fever.     · There is a new or increasing yellow tint to your skin or the whites of your eyes.     · You have any abnormal bleeding, such as:  ¨ Nosebleeds. ¨ Vaginal bleeding that is different (heavier, more frequent, at a different time of the month) than what you are used to. ¨ Bloody or black stools, or rectal bleeding. ¨ Bloody or pink urine.    Watch closely for changes in your health, and be sure to contact your doctor if:    · Your belly is getting bigger.     · You are gaining weight.     · You have any problems. Where can you learn more? Go to http://haven-magdi.info/. Enter Q791 in the search box to learn more about \"Nonalcoholic Steatohepatitis (PABLO): Care Instructions. \"  Current as of: May 12, 2017  Content Version: 11.7  © 3512-2647 [employer]abl[employer]. Care instructions adapted under license by [employer] (which disclaims liability or warranty for this information). If you have questions about a medical condition or this instruction, always ask your healthcare professional. [address] any warranty or liability for your use of this information. A Healthy Lifestyle: Care Instructions  Your Care Instructions    A healthy lifestyle can help you feel good, stay at a healthy weight, and have plenty of energy for both work and play. A healthy lifestyle is something you can share with your whole family. A healthy lifestyle also can lower your risk for serious health problems, such as high blood pressure, heart disease, and diabetes. You can follow a few steps listed below to improve your health and the health of your family. Follow-up care is a key part of your treatment and safety. Be sure to make and go to all appointments, and call your doctor if you are having problems.  It's also a good idea to know your test results and keep a list of the medicines you take. How can you care for yourself at home? · Do not eat too much sugar, fat, or fast foods. You can still have dessert and treats now and then. The goal is moderation. · Start small to improve your eating habits. Pay attention to portion sizes, drink less juice and soda pop, and eat more fruits and vegetables. ¨ Eat a healthy amount of food. A 3-ounce serving of meat, for example, is about the size of a deck of cards. Fill the rest of your plate with vegetables and whole grains. ¨ Limit the amount of soda and sports drinks you have every day. Drink more water when you are thirsty. ¨ Eat at least 5 servings of fruits and vegetables every day. It may seem like a lot, but it is not hard to reach this goal. A serving or helping is 1 piece of fruit, 1 cup of vegetables, or 2 cups of leafy, raw vegetables. Have an apple or some carrot sticks as an afternoon snack instead of a candy bar. Try to have fruits and/or vegetables at every meal.  · Make exercise part of your daily routine. You may want to start with simple activities, such as walking, bicycling, or slow swimming. Try to be active 30 to 60 minutes every day. You do not need to do all 30 to 60 minutes all at once. For example, you can exercise 3 times a day for 10 or 20 minutes. Moderate exercise is safe for most people, but it is always a good idea to talk to your doctor before starting an exercise program.  · Keep moving. Maksim Doanter the lawn, work in the garden, or [employer]. Take the stairs instead of the elevator at work. · If you smoke, quit. People who smoke have an increased risk for heart attack, stroke, cancer, and other lung illnesses. Quitting is hard, but there are ways to boost your chance of quitting tobacco for good. ¨ Use nicotine gum, patches, or lozenges. ¨ Ask your doctor about stop-smoking programs and medicines. ¨ Keep trying.   In addition to reducing your risk of diseases in the future, you will notice some benefits soon after you stop using tobacco. If you have shortness of breath or asthma symptoms, they will likely get better within a few weeks after you quit. · Limit how much alcohol you drink. Moderate amounts of alcohol (up to 2 drinks a day for men, 1 drink a day for women) are okay. But drinking too much can lead to liver problems, high blood pressure, and other health problems. Family health  If you have a family, there are many things you can do together to improve your health. · Eat meals together as a family as often as possible. · Eat healthy foods. This includes fruits, vegetables, lean meats and dairy, and whole grains. · Include your family in your fitness plan. Most people think of activities such as jogging or tennis as the way to fitness, but there are many ways you and your family can be more active. Anything that makes you breathe hard and gets your heart pumping is exercise. Here are some tips:  ¨ Walk to do errands or to take your child to school or the bus. ¨ Go for a family bike ride after dinner instead of watching TV. Where can you learn more? Go to http://haven-magdi.info/. Enter J569 in the search box to learn more about \"A Healthy Lifestyle: Care Instructions. \"  Current as of: December 7, 2017  Content Version: 11.7  © 5603-4428 [employer]. Care instructions adapted under license by [employer] (which disclaims liability or warranty for this information). If you have questions about a medical condition or this instruction, always ask your healthcare professional. [place] [address] any warranty or liability for your use of this information.

## 2018-09-12 NOTE — MR AVS SNAPSHOT
[facility] 
 
 
 [address], [place] [address] [address] 
[phone] Patient: Jeremy Dennison MRN: [id number] MOJ:[id number]/15/1857 Visit Information Date & Time Provider Department Dept. Phone Encounter #  
 9/12/2018 11:00 AM Sonia Mckeon, [facility] [id number] Your Appointments 10/3/2018  9:00 AM  
Office Visit with BONNIE King [facility] ([place]) Appt Note: 3 wk fu  
 [address], [facility] [address] [address]  
[phone]  
  
   
 [address],  [address] [place] [address] Upcoming Health Maintenance Date Due ZOSTER VACCINE AGE 60> 6/13/2014 Influenza Age 5 to Adult 8/1/2018 MEDICARE YEARLY EXAM 5/4/2019 DTaP/Tdap/Td series (2 - Td) 3/14/2022 Allergies as of 9/12/2018  Review Complete On: 9/12/2018 By: BONNIE King No Known Allergies Current Immunizations  Reviewed on 8/29/2016 Name Date Influenza Vaccine 10/2/2014 Influenza Vaccine Emerson Leah) 8/29/2016 Influenza Vaccine (Quad) PF 9/30/2015 TDAP Vaccine 3/14/2012 Not reviewed this visit You Were Diagnosed With   
  
 Codes Comments Increased ammonia level    -  Primary ICD-10-CM: R79.89 ICD-9-CM: 790.6 Dark stools     ICD-10-CM: R19.5 ICD-9-CM: 792.1 History of fatty infiltration of liver     ICD-10-CM: Z87.19 ICD-9-CM: V12.79 Vitals BP Pulse Temp Resp Height(growth percentile) Weight(growth percentile) 116/65 (BP 1 Location: Left arm, BP Patient Position: Sitting) 63 97.7 °F (36.5 °C) (Oral) 20 4' 10\" (1.473 m) 94 lb (42.6 kg) SpO2 BMI Smoking Status 98% 19.65 kg/m2 Never Smoker BMI and BSA Data Body Mass Index Body Surface Area 19.65 kg/m 2 1.32 m 2 Preferred Pharmacy Pharmacy Name Phone [facility] [address] [address] [facility] 2/3 [phone] Your Updated Medication List  
  
   
This list is accurate as of 9/12/18 12:30 PM.  Always use your most recent med list.  
  
  
  
  
 acetaminophen 325 mg tablet Commonly known as:  TYLENOL  
325 mg.  
  
 carvedilol 3.125 mg tablet Commonly known as:  COREG  
TAKE 1 TABLET BY MOUTH TWICE DAILY WITH MEALS (BKFST & SUPPER) FOR HTN  
  
 docosanol 10 % topical cream  
Commonly known as:  ABREVA Thin film DOK PLUS 8.6-50 mg per tablet Generic drug:  senna-docusate TAKE 1 TABLET BY MOUTH TWICE DAILY FOR CHRONIC CONSTIPATION  
  
 * enalapril 10 mg tablet Commonly known as:  Shelcaden Henry Take 1 Tab by mouth two (2) times a day. * enalapril 10 mg tablet Commonly known as:  VASOTEC  
TAKE 1 TABLET BY MOUTH TWICE DAILY FOR HTN  
  
 * enalapril 10 mg tablet Commonly known as:  VASOTEC  
TAKE 1 TABLET BY MOUTH TWICE DAILY FOR HTN  
  
 * enalapril 10 mg tablet Commonly known as:  VASOTEC  
TAKE 1 TABLET BY MOUTH TWICE DAILY FOR HTN  
  
 * ferrous sulfate 300 mg (60 mg iron)/5 mL syrup TAKE ONE TEASPOONFUL (300MG) BY MOUTH TWICE DAILY * ferrous sulfate 220 mg (44 mg iron)/5 mL solution TAKE ONE TEASPOONFUL (220MG) BY MOUTH TWICE DAILY * hydroCHLOROthiazide 25 mg tablet Commonly known as:  HYDRODIURIL  
TAKE 1 TABLET BY MOUTH ONCE DAILY * hydroCHLOROthiazide 25 mg tablet Commonly known as:  HYDRODIURIL  
TAKE 1 TABLET BY MOUTH ONCE DAILY  
  
 ondansetron 4 mg disintegrating tablet Commonly known as:  ZOFRAN ODT  
 Take 1 Tab by mouth every eight (8) hours as needed for Nausea. ONE-A-DAY MEN'S MULTIVITAMIN 400- mcg Tab Generic drug:  multivit-min-folic-vit K-lycop TAKE 1 TABLET BY MOUTH ONCE DAILY potassium chloride 10 mEq tablet Commonly known as:  KLOR-CON  
TAKE 1 TABLET BY MOUTH ONCE DAILY FOR LOW BLOOD LEVELS (MAY CRUSH ANDTAKE WITH APPLESAUCE)  
  
 raNITIdine 150 mg tablet Commonly known as:  ZANTAC TAKE 1 TABLET BY MOUTH TWICE DAILY  
  
 simvastatin 20 mg tablet Commonly known as:  ZOCOR  
TAKE 1 TABLET BY MOUTH NIGHTLY FOR DYSLIPIDEMIA  
  
 sorbitol 70 % solution TAKE 30ML BY MOUTH TWICE DAILY FOR LAXATIVE  
  
 * sucralfate 1 gram tablet Commonly known as:  CARAFATE  
TAKE 1 TABLET BY MOUTH FOUR TIMES DAILY AFTER MEALS AND AT BEDTIME FOR GERD  
  
 * CARAFATE 100 mg/mL suspension Generic drug:  sucralfate TAKE 10ML (1GM) BY MOUTH FOUR TIMES DAILY FOR GERD Swab Swab Commonly known as:  TOOTHETTE Sig: Use as needed  
  
 therapeutic multivitamin-minerals tablet Commonly known as:  THERAGRAN-M Take 1 Tab by mouth daily. VITAMIN B-12 2,500 mcg sublingual tablet Generic drug:  cyanocobalamin TAKE 1 TABLET BY MOUTH ONCE DAILY  
  
 VITAMIN D2 50,000 unit capsule Generic drug:  ergocalciferol TAKE 1 CAPSULE BY MOUTH EVERY 7 DAYS * Notice: This list has 14 medication(s) that are the same as other medications prescribed for you. Read the directions carefully, and ask your doctor or other care provider to review them with you. We Performed the Following REFERRAL TO GASTROENTEROLOGY [EBQ48 Custom] Comments:  
 [place] or [place] (closest to home); high ammonia levels, recent hospital admission Referral Information Referral ID Referred By Referred To  
  
 [id number] [facility] Not Available Visits Status Start Date End Date 1 New Request 9/12/18 9/12/19 If your referral has a status of pending review or denied, additional information will be sent to support the outcome of this decision. Patient Instructions 1. Referral to [facility] 2. Follow up in 2-3 weeks with pcp 3. Krystyna alex for assistance with bathing and transferring/rx paper rx written/signed by Dr. Maureen Bah Nonalcoholic Steatohepatitis (PABLO): Care Instructions Your Care Instructions Nonalcoholic steatohepatitis (PABLO) is liver inflammation. It is caused by a buildup of fat in the liver. The fat buildup is not caused by drinking alcohol. Because of the inflammation, the liver does not work as well as it should. PABLO is part of a group of liver diseases called nonalcoholic fatty liver disease. In these diseases, fat builds up in the liver and sometimes causes liver damage. This damage can get worse over time. Follow-up care is a key part of your treatment and safety. Be sure to make and go to all appointments, and call your doctor if you are having problems. It's also a good idea to know your test results and keep a list of the medicines you take. How can you care for yourself at home? · Stay at a healthy weight. Or if you need to, slowly get to a healthy weight. · Control your cholesterol. Talk to your doctor about ways to lower your cholesterol, if needed. You might try getting active, taking medicines, and making healthy changes to your diet. · Eat healthy foods. This includes fruits, vegetables, lean meats and dairy, and whole grains. · If you have diabetes, keep your blood sugar at your target level. · Get at least 30 minutes of exercise on most days of the week. Walking is a good choice. You also may want to do other activities, such as running, swimming, cycling, or playing tennis or team sports. · Limit alcohol, or do not drink. Alcohol can damage the liver and cause health problems. When should you call for help? Call 911 anytime you think you may need emergency care. For example, call if: 
  · You have trouble breathing.  
  · You vomit blood or what looks like coffee grounds.  
 Call your doctor now or seek immediate medical care if: 
  · You feel very sleepy or confused.  
  · You have new or worse belly pain.  
  · You have a fever.  
  · There is a new or increasing yellow tint to your skin or the whites of your eyes.  
  · You have any abnormal bleeding, such as: 
¨ Nosebleeds. ¨ Vaginal bleeding that is different (heavier, more frequent, at a different time of the month) than what you are used to. ¨ Bloody or black stools, or rectal bleeding. ¨ Bloody or pink urine.  
 Watch closely for changes in your health, and be sure to contact your doctor if: 
  · Your belly is getting bigger.  
  · You are gaining weight.  
  · You have any problems. Where can you learn more? Go to http://haven-magdi.info/. Enter A377 in the search box to learn more about \"Nonalcoholic Steatohepatitis (PABLO): Care Instructions. \" Current as of: May 12, 2017 Content Version: 11.7 © 9782-4779 [employer]. Care instructions adapted under license by [employer] (which disclaims liability or warranty for this information). If you have questions about a medical condition or this instruction, always ask your healthcare professional. [address] any warranty or liability for your use of this information. A Healthy Lifestyle: Care Instructions Your Care Instructions A healthy lifestyle can help you feel good, stay at a healthy weight, and have plenty of energy for both work and play. A healthy lifestyle is something you can share with your whole family. A healthy lifestyle also can lower your risk for serious health problems, such as high blood pressure, heart disease, and diabetes.  
You can follow a few steps listed below to improve your health and the health of your family. Follow-up care is a key part of your treatment and safety. Be sure to make and go to all appointments, and call your doctor if you are having problems. It's also a good idea to know your test results and keep a list of the medicines you take. How can you care for yourself at home? · Do not eat too much sugar, fat, or fast foods. You can still have dessert and treats now and then. The goal is moderation. · Start small to improve your eating habits. Pay attention to portion sizes, drink less juice and soda pop, and eat more fruits and vegetables. ¨ Eat a healthy amount of food. A 3-ounce serving of meat, for example, is about the size of a deck of cards. Fill the rest of your plate with vegetables and whole grains. ¨ Limit the amount of soda and sports drinks you have every day. Drink more water when you are thirsty. ¨ Eat at least 5 servings of fruits and vegetables every day. It may seem like a lot, but it is not hard to reach this goal. A serving or helping is 1 piece of fruit, 1 cup of vegetables, or 2 cups of leafy, raw vegetables. Have an apple or some carrot sticks as an afternoon snack instead of a candy bar. Try to have fruits and/or vegetables at every meal. 
· Make exercise part of your daily routine. You may want to start with simple activities, such as walking, bicycling, or slow swimming. Try to be active 30 to 60 minutes every day. You do not need to do all 30 to 60 minutes all at once. For example, you can exercise 3 times a day for 10 or 20 minutes. Moderate exercise is safe for most people, but it is always a good idea to talk to your doctor before starting an exercise program. 
· Keep moving. Kymberly Dove the lawn, work in the garden, or [employer]. Take the stairs instead of the elevator at work. · If you smoke, quit. People who smoke have an increased risk for heart attack, stroke, cancer, and other lung illnesses.  Quitting is hard, but there are ways to boost your chance of quitting tobacco for good. ¨ Use nicotine gum, patches, or lozenges. ¨ Ask your doctor about stop-smoking programs and medicines. ¨ Keep trying. In addition to reducing your risk of diseases in the future, you will notice some benefits soon after you stop using tobacco. If you have shortness of breath or asthma symptoms, they will likely get better within a few weeks after you quit. · Limit how much alcohol you drink. Moderate amounts of alcohol (up to 2 drinks a day for men, 1 drink a day for women) are okay. But drinking too much can lead to liver problems, high blood pressure, and other health problems. Family health If you have a family, there are many things you can do together to improve your health. · Eat meals together as a family as often as possible. · Eat healthy foods. This includes fruits, vegetables, lean meats and dairy, and whole grains. · Include your family in your fitness plan. Most people think of activities such as jogging or tennis as the way to fitness, but there are many ways you and your family can be more active. Anything that makes you breathe hard and gets your heart pumping is exercise. Here are some tips: 
¨ Walk to do errands or to take your child to school or the bus. ¨ Go for a family bike ride after dinner instead of watching TV. Where can you learn more? Go to http://haven-magdi.info/. Enter S672 in the search box to learn more about \"A Healthy Lifestyle: Care Instructions. \" Current as of: December 7, 2017 Content Version: 11.7 © 1785-8814 [employer]. Care instructions adapted under license by [employer] (which disclaims liability or warranty for this information). If you have questions about a medical condition or this instruction, always ask your healthcare professional. [address] any warranty or liability for your use of this information. Introducing [facility]! [facility] introduces [email or web address] patient portal. Now you can access parts of your medical record, email your doctor's office, and request medication refills online. 1. In your internet browser, go to [email or web address] 2. Click on the First Time User? Click Here link in the Sign In box. You will see the New Member Sign Up page. 3. Enter your [email or web address] Access Code exactly as it appears below. You will not need to use this code after youve completed the sign-up process. If you do not sign up before the expiration date, you must request a new code. · [email or web address] Access Code: [email or web address] Expires: 12/11/2018 12:30 PM 
 
4. Enter the last four digits of your Social Security Number (xxxx) and Date of Birth (mm/dd/yyyy) as indicated and click Submit. You will be taken to the next sign-up page. 5. Create a [email or web address] ID. This will be your [email or web address] login ID and cannot be changed, so think of one that is secure and easy to remember. 6. Create a [email or web address] password. You can change your password at any time. 7. Enter your Password Reset Question and Answer. This can be used at a later time if you forget your password. 8. Enter your e-mail address. You will receive e-mail notification when new information is available in [address] 9. Click Sign Up. You can now view and download portions of your medical record. 10. Click the Download Summary menu link to download a portable copy of your medical information. If you have questions, please visit the Frequently Asked Questions section of the [email or web address] website. Remember, [email or web address] is NOT to be used for urgent needs. For medical emergencies, dial 911. Now available from your iPhone and Android! Please provide this summary of care documentation to your next provider. Your primary care clinician is listed as Kasandra Rodas. If you have any questions after today's visit, please call [phone].

## 2018-09-12 NOTE — PROGRESS NOTES
Chief Complaint   Patient presents with    Transitions Of Care         HPI:    Augustina Clark is a 58 y.o.  male and presents today with his caregiver Darshana Chino (lives in a group home). He was recently admitted to [facility] 8/21/18 to 8/24/18 for altered mental status, Acute encephalopathy, possibly due to hyperammonemia (which improved with lactulose enema). He was admitted for confusion/delirium, altered LOC, unexplained  Seizures new or progressive. His caregiver stated he couldn't be aroused after his day program and then was taken to the ED. Pt is a 60year old with congenital spastic quadriplegic cerebral palsy. Hospital Course   Chief Complaint/History of Present Illness:   As per Dr. Rosaura Smith' H&P:    \"to the emergency room brought in by his caregivers after being altered after being picked up from . The patient unable to give history as he is completely altered. Per the caregiver Ari Méndez, the patient was in his usual state of health this morning. He goes to daily  program which he went to. She states when they picked him up from , the patient was very lethargic and sleepy. They note that approximately 2 days ago, patient had an episode of nausea and vomiting, which resolved after he had a bowel movement. He has had no additional episodes of nausea and vomiting since. Per the caregivers, the patient has had no changes in his medication. His baseline mental functioning is talkative, although he is contracted and wheelchair-bound. Currently in the emergency room, the patient is lethargic, minimally responsive. [facility] consulted for admission for the above. \"    Admitting diagnosis:  Acute encephalopathy, possibly due to hyperammonemia (which improved with lactulose enema)    Calcium low in hospital along with anemia.          The patient's caregiver answers most of my question as the patient is not verbal.   He does make sounds and some words but unable to answer questions. His caregiver reports he has been feeling better since d/c from the hospital.   No fevers. Acting his normal self. They are requesting GI referral and rx for [facility] lift (written rx of Dr. Kathleen Flores signed). Sent to be scanned in. Past Medical History:   Diagnosis Date    Constipation, chronic     Dyslipidemia 4/8/2014    Femur fracture ([address] 7/7/16    brace at right femur    GERD (gastroesophageal reflux disease)     Hypertension     MR (mental retardation), severe     Muscle weakness of lower extremity     with spasticity    Urinary incontinence due to cognitive impairment 8/23/2012     History reviewed. No pertinent surgical history.   No Known Allergies  Current Outpatient Prescriptions   Medication Sig Dispense Refill    carvedilol (COREG) 3.125 mg tablet TAKE 1 TABLET BY MOUTH TWICE DAILY WITH MEALS (BKFST & SUPPER) FOR HTN 60 Tab 0    ONE-A-DAY MEN'S MULTIVITAMIN 400- mcg tab TAKE 1 TABLET BY MOUTH ONCE DAILY 30 Tab 0    potassium chloride (KLOR-CON) 10 mEq tablet TAKE 1 TABLET BY MOUTH ONCE DAILY FOR LOW BLOOD LEVELS (MAY CRUSH ANDTAKE WITH APPLESAUCE) 30 Tab 0    simvastatin (ZOCOR) 20 mg tablet TAKE 1 TABLET BY MOUTH NIGHTLY FOR DYSLIPIDEMIA 30 Tab 0    VITAMIN D2 50,000 unit capsule TAKE 1 CAPSULE BY MOUTH EVERY 7 DAYS 4 Cap 0    CARAFATE 100 mg/mL suspension TAKE 10ML (1GM) BY MOUTH FOUR TIMES DAILY FOR GERD 840 mL 0    Swab (TOOTHETTE) swab Sig: Use as needed 600 Each 3    VITAMIN B-12 2,500 mcg sublingual tablet TAKE 1 TABLET BY MOUTH ONCE DAILY 100 Tab 0    enalapril (VASOTEC) 10 mg tablet TAKE 1 TABLET BY MOUTH TWICE DAILY FOR HTN 60 Tab 3    raNITIdine (ZANTAC) 150 mg tablet TAKE 1 TABLET BY MOUTH TWICE DAILY 60 Tab 3    DOK PLUS 8.6-50 mg per tablet TAKE 1 TABLET BY MOUTH TWICE DAILY FOR CHRONIC CONSTIPATION 60 Tab 3    sorbitol 70 % solution TAKE 30ML BY MOUTH TWICE DAILY FOR LAXATIVE 473 mL PRN    ferrous sulfate 220 mg (44 mg iron)/5 mL solution TAKE ONE TEASPOONFUL (220MG) BY MOUTH TWICE DAILY 473 mL 0    sucralfate (CARAFATE) 1 gram tablet TAKE 1 TABLET BY MOUTH FOUR TIMES DAILY AFTER MEALS AND AT BEDTIME FOR GERD 120 Tab 0    enalapril (VASOTEC) 10 mg tablet TAKE 1 TABLET BY MOUTH TWICE DAILY FOR HTN 60 Tab 0    ferrous sulfate 300 mg (60 mg iron)/5 mL syrup TAKE ONE TEASPOONFUL (300MG) BY MOUTH TWICE DAILY 500 mL 0    enalapril (VASOTEC) 10 mg tablet TAKE 1 TABLET BY MOUTH TWICE DAILY FOR HTN 60 Tab 0    ondansetron (ZOFRAN ODT) 4 mg disintegrating tablet Take 1 Tab by mouth every eight (8) hours as needed for Nausea. 30 Tab 0    hydroCHLOROthiazide (HYDRODIURIL) 25 mg tablet TAKE 1 TABLET BY MOUTH ONCE DAILY 30 Tab 0    acetaminophen (TYLENOL) 325 mg tablet 325 mg.      enalapril (VASOTEC) 10 mg tablet Take 1 Tab by mouth two (2) times a day. 30 Tab 0    therapeutic multivitamin-minerals (THERAGRAN-M) tablet Take 1 Tab by mouth daily.  30 Tab 0    docosanol (ABREVA) 10 % topical cream Thin film 2 g 0    hydroCHLOROthiazide (HYDRODIURIL) 25 mg tablet TAKE 1 TABLET BY MOUTH ONCE DAILY 28 Tab 0    hydroCHLOROthiazide (HYDRODIURIL) 25 mg tablet TAKE 1 TABLET BY MOUTH ONCE DAILY 30 Tab 0    hydroCHLOROthiazide (HYDRODIURIL) 25 mg tablet TAKE 1 TABLET BY MOUTH ONCE DAILY 30 Tab 0    hydroCHLOROthiazide (HYDRODIURIL) 25 mg tablet TAKE 1 TABLET BY MOUTH ONCE DAILY 30 Tab 0    hydroCHLOROthiazide (HYDRODIURIL) 25 mg tablet TAKE 1 TABLET BY MOUTH ONCE DAILY 30 Tab 0     Family History   Problem Relation Age of Onset    Family history unknown: Yes     Social History     Social History    Marital status: SINGLE     Spouse name: N/A    Number of children: N/A    Years of education: N/A     Social History Main Topics    Smoking status: Never Smoker    Smokeless tobacco: Never Used    Alcohol use No    Drug use: No    Sexual activity: No     Other Topics Concern    None     Social History Narrative          Visit Vitals    /65 (BP 1 Location: Left arm, BP Patient Position: Sitting)    Pulse 63    Temp 97.7 °F (36.5 °C) (Oral)    Resp 20    Ht 4' 10\" (1.473 m)    Wt 94 lb (42.6 kg)    SpO2 98%    BMI 19.65 kg/m2        ROS   Pertinent in HPI but unable to get from patient, caregiver answered questions to her ability  Physical Exam   Nursing notes and vitals reviewed  Visit Vitals    /65 (BP 1 Location: Left arm, BP Patient Position: Sitting)    Pulse 63    Temp 97.7 °F (36.5 °C) (Oral)    Resp 20    Ht 4' 10\" (1.473 m)    Wt 94 lb (42.6 kg)    SpO2 98%    BMI 19.65 kg/m2     General:  Alert, cooperative, no distress. Pt unable to communicate, has caregiver, makes sounds but does respond with a smile/laugh when spoken to. Head:  Normocephalic, without obvious abnormality, atraumatic. Eyes:  Limited EXAM   Lungs:   Clear to auscultation bilaterally. Heart:  Regular rate and rhythm. Limited due to pt position in wheelchair. Abdomen:   Limited, but no obvious deformities, bowel sounds appear normal   Extremities: Extremities normal, atraumatic, no cyanosis or edema. Thin and contracted   Skin: Skin color, texture, turgor normal. No rashes or lesions. Neurologic:  very limited exam; pt does not verbally communicate, only makes sounds; tries to speak but incomprehensible. LABS   Results for orders placed or performed during the hospital encounter of 08/28/17   FERRITIN   Result Value Ref Range    Ferritin 17 8 - 388 NG/ML   CBC WITH AUTOMATED DIFF   Result Value Ref Range    WBC 4.8 4.6 - 13.2 K/uL    RBC 4.93 4.70 - 5.50 M/uL    HGB 13.3 13.0 - 16.0 g/dL    HCT 42.1 36.0 - 48.0 %    MCV 85.4 74.0 - 97.0 FL    MCH 27.0 24.0 - 34.0 PG    MCHC 31.6 31.0 - 37.0 g/dL    RDW 16.4 (H) 11.6 - 14.5 %    PLATELET 277 246 - 983 K/uL    MPV 11.8 9.2 - 11.8 FL    NEUTROPHILS 69 40 - 73 %    LYMPHOCYTES 20 (L) 21 - 52 %    MONOCYTES 8 3 - 10 %    EOSINOPHILS 3 0 - 5 %    BASOPHILS 0 0 - 2 %    ABS.  NEUTROPHILS 3.3 1.8 - 8.0 K/UL    ABS. LYMPHOCYTES 1.0 0.9 - 3.6 K/UL    ABS. MONOCYTES 0.4 0.05 - 1.2 K/UL    ABS. EOSINOPHILS 0.2 0.0 - 0.4 K/UL    ABS. BASOPHILS 0.0 0.0 - 0.06 K/UL    DF AUTOMATED     VITAMIN D, 25 HYDROXY   Result Value Ref Range    Vitamin D 25-Hydroxy 110.6 (H) 30 - 100 ng/mL           Assessment/Plan:        ICD-10-CM ICD-9-CM    1. History of recent hospitalization Z92.89 V13.9    2. Increased ammonia level R79.89 790.6 REFERRAL TO GASTROENTEROLOGY   3. Dark stools R19.5 792.1 REFERRAL TO GASTROENTEROLOGY   4. History of fatty infiltration of liver Z87.19 V12.79 REFERRAL TO GASTROENTEROLOGY   5. Transition of care performed with sharing of clinical summary Z91.89 V15.89      1. Referral to [facility]  2. Follow up in 2-3 weeks with pcp  3. Marianela alex for assistance with bathing and transferring/rx paper rx written/signed by Dr. Venkatesh Saenz  I have discussed the diagnosis with the patient and the intended plan as seen in the above orders. The patient has received an after-visit summary and questions were answered concerning future plans. I have discussed medication side effects and warnings with the patient as well. I have reviewed the plan of care with the patient, accepted their input and they are in agreement with the treatment goals. Follow-up Disposition: 2-3 weeks with pcp      Mr. Mcnally has a reminder for a \"due or due soon\" health maintenance. I have asked that he contact his primary care provider for follow-up on this health maintenance. BONNIE Tinoco PA-C  [facility]        I reviewed the patient's medical history, the physician assistant's findings on physical examination, the patient's diagnoses, and treatment plan as documented in the progress note. I concur with the treatment plan as documented. There are no additional recommendations at this time.     Quinton Recinos MD

## 2018-09-12 NOTE — PROGRESS NOTES
Patient is in the office today for transition of care, patient care giver is requesting a referral to GI and a script for a lety lift. 1. Have you been to the ER, urgent care clinic since your last visit? Hospitalized since your last visit? No    2. Have you seen or consulted any other health care providers outside of the [address] [place] [address] since your last visit? Include any pap smears or colon screening.  No

## 2018-09-14 RX ORDER — ERGOCALCIFEROL 1.25 MG/1
50000 CAPSULE ORAL
COMMUNITY
End: 2018-10-01 | Stop reason: SDUPTHER

## 2018-09-14 RX ORDER — BENZONATATE 200 MG/1
200 CAPSULE ORAL
COMMUNITY
End: 2019-01-23 | Stop reason: SDUPTHER

## 2018-09-14 RX ORDER — LACTULOSE 10 G/15ML
30 SOLUTION ORAL; RECTAL
COMMUNITY
Start: 2018-08-24 | End: 2018-10-15 | Stop reason: SDUPTHER

## 2018-10-03 ENCOUNTER — OFFICE VISIT (OUTPATIENT)
Dept: FAMILY MEDICINE CLINIC | Age: 63
End: 2018-10-03

## 2018-10-03 VITALS
HEIGHT: 60 IN | TEMPERATURE: 97.9 F | DIASTOLIC BLOOD PRESSURE: 65 MMHG | OXYGEN SATURATION: 98 % | RESPIRATION RATE: 18 BRPM | SYSTOLIC BLOOD PRESSURE: 116 MMHG | BODY MASS INDEX: 18.46 KG/M2 | WEIGHT: 94 LBS | HEART RATE: 79 BPM

## 2018-10-03 DIAGNOSIS — R19.5 DARK STOOLS: ICD-10-CM

## 2018-10-03 DIAGNOSIS — R11.2 NON-INTRACTABLE VOMITING WITH NAUSEA, UNSPECIFIED VOMITING TYPE: ICD-10-CM

## 2018-10-03 DIAGNOSIS — I10 ESSENTIAL HYPERTENSION: Primary | ICD-10-CM

## 2018-10-03 DIAGNOSIS — Z23 NEED FOR INFLUENZA VACCINATION: ICD-10-CM

## 2018-10-03 DIAGNOSIS — E55.9 VITAMIN D DEFICIENCY: ICD-10-CM

## 2018-10-03 DIAGNOSIS — E78.5 DYSLIPIDEMIA: ICD-10-CM

## 2018-10-03 DIAGNOSIS — K21.9 GASTROESOPHAGEAL REFLUX DISEASE WITHOUT ESOPHAGITIS: ICD-10-CM

## 2018-10-03 DIAGNOSIS — Z23 ENCOUNTER FOR IMMUNIZATION: ICD-10-CM

## 2018-10-03 DIAGNOSIS — Z79.899 ENCOUNTER FOR LONG-TERM (CURRENT) USE OF MEDICATIONS: ICD-10-CM

## 2018-10-03 RX ORDER — ONDANSETRON 4 MG/1
4 TABLET, ORALLY DISINTEGRATING ORAL
Qty: 30 TAB | Refills: 0 | Status: SHIPPED | OUTPATIENT
Start: 2018-10-03 | End: 2019-01-23 | Stop reason: SDUPTHER

## 2018-10-03 NOTE — PATIENT INSTRUCTIONS
DASH Diet: Care Instructions  Your Care Instructions    The DASH diet is an eating plan that can help lower your blood pressure. DASH stands for Dietary Approaches to Stop Hypertension. Hypertension is high blood pressure. The DASH diet focuses on eating foods that are high in calcium, potassium, and magnesium. These nutrients can lower blood pressure. The foods that are highest in these nutrients are fruits, vegetables, low-fat dairy products, nuts, seeds, and legumes. But taking calcium, potassium, and magnesium supplements instead of eating foods that are high in those nutrients does not have the same effect. The DASH diet also includes whole grains, fish, and poultry. The DASH diet is one of several lifestyle changes your doctor may recommend to lower your high blood pressure. Your doctor may also want you to decrease the amount of sodium in your diet. Lowering sodium while following the DASH diet can lower blood pressure even further than just the DASH diet alone. Follow-up care is a key part of your treatment and safety. Be sure to make and go to all appointments, and call your doctor if you are having problems. It's also a good idea to know your test results and keep a list of the medicines you take. How can you care for yourself at home? Following the DASH diet  · Eat 4 to 5 servings of fruit each day. A serving is 1 medium-sized piece of fruit, ½ cup chopped or canned fruit, 1/4 cup dried fruit, or 4 ounces (½ cup) of fruit juice. Choose fruit more often than fruit juice. · Eat 4 to 5 servings of vegetables each day. A serving is 1 cup of lettuce or raw leafy vegetables, ½ cup of chopped or cooked vegetables, or 4 ounces (½ cup) of vegetable juice. Choose vegetables more often than vegetable juice. · Get 2 to 3 servings of low-fat and fat-free dairy each day. A serving is 8 ounces of milk, 1 cup of yogurt, or 1 ½ ounces of cheese. · Eat 6 to 8 servings of grains each day.  A serving is 1 slice of bread, 1 ounce of dry cereal, or ½ cup of cooked rice, pasta, or cooked cereal. Try to choose whole-grain products as much as possible. · Limit lean meat, poultry, and fish to 2 servings each day. A serving is 3 ounces, about the size of a deck of cards. · Eat 4 to 5 servings of nuts, seeds, and legumes (cooked dried beans, lentils, and split peas) each week. A serving is 1/3 cup of nuts, 2 tablespoons of seeds, or ½ cup of cooked beans or peas. · Limit fats and oils to 2 to 3 servings each day. A serving is 1 teaspoon of vegetable oil or 2 tablespoons of salad dressing. · Limit sweets and added sugars to 5 servings or less a week. A serving is 1 tablespoon jelly or jam, ½ cup sorbet, or 1 cup of lemonade. · Eat less than 2,300 milligrams (mg) of sodium a day. If you limit your sodium to 1,500 mg a day, you can lower your blood pressure even more. Tips for success  · Start small. Do not try to make dramatic changes to your diet all at once. You might feel that you are missing out on your favorite foods and then be more likely to not follow the plan. Make small changes, and stick with them. Once those changes become habit, add a few more changes. · Try some of the following:  ¨ Make it a goal to eat a fruit or vegetable at every meal and at snacks. This will make it easy to get the recommended amount of fruits and vegetables each day. ¨ Try yogurt topped with fruit and nuts for a snack or healthy dessert. ¨ Add lettuce, tomato, cucumber, and onion to sandwiches. ¨ Combine a ready-made pizza crust with low-fat mozzarella cheese and lots of vegetable toppings. Try using tomatoes, squash, spinach, broccoli, carrots, cauliflower, and onions. ¨ Have a variety of cut-up vegetables with a low-fat dip as an appetizer instead of chips and dip. ¨ Sprinkle sunflower seeds or chopped almonds over salads. Or try adding chopped walnuts or almonds to cooked vegetables.   ¨ Try some vegetarian meals using beans and peas. Add garbanzo or kidney beans to salads. Make burritos and tacos with mashed vásquez beans or black beans. Where can you learn more? Go to http://haven-magdi.info/. Enter K593 in the search box to learn more about \"DASH Diet: Care Instructions. \"  Current as of: December 6, 2017  Content Version: 11.7  © 7847-1411 [employer]. Care instructions adapted under license by [employer] (which disclaims liability or warranty for this information). If you have questions about a medical condition or this instruction, always ask your healthcare professional. [address] any warranty or liability for your use of this information. A Healthy Lifestyle: Care Instructions  Your Care Instructions    A healthy lifestyle can help you feel good, stay at a healthy weight, and have plenty of energy for both work and play. A healthy lifestyle is something you can share with your whole family. A healthy lifestyle also can lower your risk for serious health problems, such as high blood pressure, heart disease, and diabetes. You can follow a few steps listed below to improve your health and the health of your family. Follow-up care is a key part of your treatment and safety. Be sure to make and go to all appointments, and call your doctor if you are having problems. It's also a good idea to know your test results and keep a list of the medicines you take. How can you care for yourself at home? · Do not eat too much sugar, fat, or fast foods. You can still have dessert and treats now and then. The goal is moderation. · Start small to improve your eating habits. Pay attention to portion sizes, drink less juice and soda pop, and eat more fruits and vegetables. ¨ Eat a healthy amount of food. A 3-ounce serving of meat, for example, is about the size of a deck of cards. Fill the rest of your plate with vegetables and whole grains.   ¨ Limit the amount of soda and sports drinks you have every day. Drink more water when you are thirsty. ¨ Eat at least 5 servings of fruits and vegetables every day. It may seem like a lot, but it is not hard to reach this goal. A serving or helping is 1 piece of fruit, 1 cup of vegetables, or 2 cups of leafy, raw vegetables. Have an apple or some carrot sticks as an afternoon snack instead of a candy bar. Try to have fruits and/or vegetables at every meal.  · Make exercise part of your daily routine. You may want to start with simple activities, such as walking, bicycling, or slow swimming. Try to be active 30 to 60 minutes every day. You do not need to do all 30 to 60 minutes all at once. For example, you can exercise 3 times a day for 10 or 20 minutes. Moderate exercise is safe for most people, but it is always a good idea to talk to your doctor before starting an exercise program.  · Keep moving. [place]iar[employer] Comes the lawn, work in the garden, or Scary Mommy. Take the stairs instead of the elevator at work. · If you smoke, quit. People who smoke have an increased risk for heart attack, stroke, cancer, and other lung illnesses. Quitting is hard, but there are ways to boost your chance of quitting tobacco for good. ¨ Use nicotine gum, patches, or lozenges. ¨ Ask your doctor about stop-smoking programs and medicines. ¨ Keep trying. In addition to reducing your risk of diseases in the future, you will notice some benefits soon after you stop using tobacco. If you have shortness of breath or asthma symptoms, they will likely get better within a few weeks after you quit. · Limit how much alcohol you drink. Moderate amounts of alcohol (up to 2 drinks a day for men, 1 drink a day for women) are okay. But drinking too much can lead to liver problems, high blood pressure, and other health problems. Family health  If you have a family, there are many things you can do together to improve your health.   · Eat meals together as a family as often as possible. · Eat healthy foods. This includes fruits, vegetables, lean meats and dairy, and whole grains. · Include your family in your fitness plan. Most people think of activities such as jogging or tennis as the way to fitness, but there are many ways you and your family can be more active. Anything that makes you breathe hard and gets your heart pumping is exercise. Here are some tips:  ¨ Walk to do errands or to take your child to school or the bus. ¨ Go for a family bike ride after dinner instead of watching TV. Where can you learn more? Go to http://havenSQZ [employer]otechmagdi.info/. Enter I179 in the search box to learn more about \"A Healthy Lifestyle: Care Instructions. \"  Current as of: December 7, 2017  Content Version: 11.7  © 3765-1731 [employer]. Care instructions adapted under license by [employer] (which disclaims liability or warranty for this information). If you have questions about a medical condition or this instruction, always ask your healthcare professional. [address] any warranty or liability for your use of this information.

## 2018-10-03 NOTE — PROGRESS NOTES
Milagros Dowell is a  58 y.o. male presents today for office visit for routine follow up. 1. Have you been to the ER, urgent care clinic or hospitalized since your last visit? NO     2. Have you seen or consulted any other health care providers outside of the [address] [place] [address] since your last visit (Include any pap smears or colon screening)? NO               VORB: Administer FLU./Jeremy Cobb DO  /Adan Coleman Grew ,CCT     Patient received Flu vaccine 0.5ml in Left deltoid. Tolerated well. No signs or symptoms of distress noted. Most current VIS given and consent signed. he was observed for 10 min post injection. There were no reactions observed.

## 2018-10-03 NOTE — MR AVS SNAPSHOT
[address] [place] [address] S  Prabhu Wong [phone] [address] 
[phone] Patient: Jennifer Goff MRN: [id number] THN:[id number] Visit Information Date & Time Provider Department Dept. Phone Encounter #  
 10/3/2018  9:00 [id number] Follow-up Instructions Return in about 3 months (around 1/3/2019) for Labs 1 week before. Upcoming Health Maintenance Date Due Shingrix Vaccine Age 50> (1 of 2) 8/13/2004 Influenza Age 5 to Adult 8/1/2018 MEDICARE YEARLY EXAM 5/4/2019 DTaP/Tdap/Td series (2 - Td) 3/14/2022 Allergies as of 10/3/2018  Review Complete On: 10/3/2018 By: Nilton Purdy No Known Allergies Current Immunizations  Reviewed on 8/29/2016 Name Date Influenza Vaccine 10/2/2014 Influenza Vaccine Arletha Junes) 8/29/2016 Influenza Vaccine (Quad) PF 9/30/2015 TDAP Vaccine 3/14/2012 Not reviewed this visit You Were Diagnosed With   
  
 Codes Comments Essential hypertension    -  Primary ICD-10-CM: I10 
ICD-9-CM: 401.9 Gastroesophageal reflux disease without esophagitis     ICD-10-CM: K21.9 ICD-9-CM: 530.81 Dyslipidemia     ICD-10-CM: E78.5 ICD-9-CM: 272.4 Vitamin D deficiency     ICD-10-CM: E55.9 ICD-9-CM: 268.9 Need for influenza vaccination     ICD-10-CM: D14 ICD-9-CM: V04.81 Dark stools     ICD-10-CM: R19.5 ICD-9-CM: 792.1 Non-intractable vomiting with nausea, unspecified vomiting type     ICD-10-CM: R11.2 ICD-9-CM: 787.01 Encounter for long-term (current) use of medications     ICD-10-CM: Z79.899 ICD-9-CM: V58.69 Vitals BP Pulse Temp Resp Height(growth percentile) Weight(growth percentile) 116/65 79 97.9 °F (36.6 °C) (Oral) 18 4' 10\" (1.473 m) 94 lb (42.6 kg) SpO2 BMI Smoking Status 98% 19.65 kg/m2 Never Smoker Vitals History BMI and BSA Data Body Mass Index Body Surface Area 19.65 kg/m 2 1.32 m 2 Preferred Pharmacy Pharmacy Name Phone [facility] [address] 2/3 [phone] Your Updated Medication List  
  
   
This list is accurate as of 10/3/18 10:03 AM.  Always use your most recent med list.  
  
  
  
  
 acetaminophen 325 mg tablet Commonly known as:  TYLENOL  
325 mg.  
  
 benzonatate 200 mg capsule Commonly known as:  TESSALON  
200 mg.  
  
 carvedilol 3.125 mg tablet Commonly known as:  COREG  
TAKE 1 TABLET BY MOUTH TWICE DAILY WITH MEALS (BKFST & SUPPER) FOR HTN  
  
 docosanol 10 % topical cream  
Commonly known as:  ABREVA Thin film DOK PLUS 8.6-50 mg per tablet Generic drug:  senna-docusate TAKE 1 TABLET BY MOUTH TWICE DAILY FOR CHRONIC CONSTIPATION  
  
 * enalapril 10 mg tablet Commonly known as:  Fidel Lute Take 1 Tab by mouth two (2) times a day. * enalapril 10 mg tablet Commonly known as:  VASOTEC  
TAKE 1 TABLET BY MOUTH TWICE DAILY FOR HTN  
  
 * enalapril 10 mg tablet Commonly known as:  VASOTEC  
TAKE 1 TABLET BY MOUTH TWICE DAILY FOR HTN  
  
 * ferrous sulfate 300 mg (60 mg iron)/5 mL syrup TAKE ONE TEASPOONFUL (300MG) BY MOUTH TWICE DAILY * ferrous sulfate 220 mg (44 mg iron)/5 mL solution TAKE ONE TEASPOONFUL (220MG) BY MOUTH TWICE DAILY * hydroCHLOROthiazide 25 mg tablet Commonly known as:  HYDRODIURIL  
TAKE 1 TABLET BY MOUTH ONCE DAILY * hydroCHLOROthiazide 25 mg tablet Commonly known as:  HYDRODIURIL  
TAKE 1 TABLET BY MOUTH ONCE DAILY  
  
 lactulose 10 gram/15 mL solution Commonly known as:  Bevelyn Pester 30 mL. ondansetron 4 mg disintegrating tablet Commonly known as:  ZOFRAN ODT Take 1 Tab by mouth every eight (8) hours as needed for Nausea. ONE-A-DAY MEN'S MULTIVITAMIN 400- mcg Tab Generic drug:  multivit-min-folic-vit K-lycop TAKE 1 TABLET BY MOUTH ONCE DAILY potassium chloride 10 mEq tablet Commonly known as:  KLOR-CON  
TAKE 1 TABLET BY MOUTH ONCE DAILY FOR LOW BLOOD LEVELS (MAY CRUSH ANDTAKE WITH APPLESAUCE)  
  
 raNITIdine 150 mg tablet Commonly known as:  ZANTAC TAKE 1 TABLET BY MOUTH TWICE DAILY  
  
 simvastatin 20 mg tablet Commonly known as:  ZOCOR  
TAKE 1 TABLET BY MOUTH NIGHTLY FOR DYSLIPIDEMIA  
  
 sorbitol 70 % solution TAKE 30ML BY MOUTH TWICE DAILY FOR LAXATIVE  
  
 * sucralfate 1 gram tablet Commonly known as:  CARAFATE  
TAKE 1 TABLET BY MOUTH FOUR TIMES DAILY AFTER MEALS AND AT BEDTIME FOR GERD  
  
 * CARAFATE 100 mg/mL suspension Generic drug:  sucralfate TAKE 10ML (1GM) BY MOUTH FOUR TIMES DAILY FOR GERD Swab Swab Commonly known as:  TOOTHETTE Sig: Use as needed  
  
 therapeutic multivitamin-minerals tablet Commonly known as:  THERAGRAN-M Take 1 Tab by mouth daily. VITAMIN B-12 2,500 mcg sublingual tablet Generic drug:  cyanocobalamin TAKE 1 TABLET BY MOUTH ONCE DAILY  
  
 VITAMIN D2 50,000 unit capsule Generic drug:  ergocalciferol TAKE 1 CAPSULE BY MOUTH EVERY 7 DAYS * Notice: This list has 9 medication(s) that are the same as other medications prescribed for you. Read the directions carefully, and ask your doctor or other care provider to review them with you. Prescriptions Printed Refills  
 ondansetron (ZOFRAN ODT) 4 mg disintegrating tablet 0 Sig: Take 1 Tab by mouth every eight (8) hours as needed for Nausea. Class: Print Route: Oral  
  
We Performed the Following REFERRAL TO GASTROENTEROLOGY [DXL04 Custom] Comments:  
 Pt with dark stools and recent nausea and vomiting of darkish emesis - please evaluate and treat. Thanks. Follow-up Instructions Return in about 3 months (around 1/3/2019) for Labs 1 week before. To-Do List   
 01/01/2019 Lab:  CBC WITH AUTOMATED DIFF   
  
 01/01/2019   Lab:  HEMOGLOBIN A1C W/O EAG   
  
 01/01/2019 Lab:  LIPID PANEL   
  
 01/01/2019 Lab:  METABOLIC PANEL, COMPREHENSIVE   
  
 01/01/2019 Lab:  TSH 3RD GENERATION   
  
 01/01/2019 Lab:  VITAMIN D, 25 HYDROXY Referral Information Referral ID Referred By Referred To  
  
 [id number] Rico Hebert MD   
   [address] [phone] Robby Short [place] Phone: [phone] Fax: [phone] Visits Status Start Date End Date 1 New Request 10/3/18 10/3/19 If your referral has a status of pending review or denied, additional information will be sent to support the outcome of this decision. Patient Instructions DASH Diet: Care Instructions Your Care Instructions The DASH diet is an eating plan that can help lower your blood pressure. DASH stands for Dietary Approaches to Stop Hypertension. Hypertension is high blood pressure. The DASH diet focuses on eating foods that are high in calcium, potassium, and magnesium. These nutrients can lower blood pressure. The foods that are highest in these nutrients are fruits, vegetables, low-fat dairy products, nuts, seeds, and legumes. But taking calcium, potassium, and magnesium supplements instead of eating foods that are high in those nutrients does not have the same effect. The DASH diet also includes whole grains, fish, and poultry. The DASH diet is one of several lifestyle changes your doctor may recommend to lower your high blood pressure. Your doctor may also want you to decrease the amount of sodium in your diet. Lowering sodium while following the DASH diet can lower blood pressure even further than just the DASH diet alone. Follow-up care is a key part of your treatment and safety. Be sure to make and go to all appointments, and call your doctor if you are having problems. It's also a good idea to know your test results and keep a list of the medicines you take. How can you care for yourself at home? Following the DASH diet · Eat 4 to 5 servings of fruit each day. A serving is 1 medium-sized piece of fruit, ½ cup chopped or canned fruit, 1/4 cup dried fruit, or 4 ounces (½ cup) of fruit juice. Choose fruit more often than fruit juice. · Eat 4 to 5 servings of vegetables each day. A serving is 1 cup of lettuce or raw leafy vegetables, ½ cup of chopped or cooked vegetables, or 4 ounces (½ cup) of vegetable juice. Choose vegetables more often than vegetable juice. · Get 2 to 3 servings of low-fat and fat-free dairy each day. A serving is 8 ounces of milk, 1 cup of yogurt, or 1 ½ ounces of cheese. · Eat 6 to 8 servings of grains each day. A serving is 1 slice of bread, 1 ounce of dry cereal, or ½ cup of cooked rice, pasta, or cooked cereal. Try to choose whole-grain products as much as possible. · Limit lean meat, poultry, and fish to 2 servings each day. A serving is 3 ounces, about the size of a deck of cards. · Eat 4 to 5 servings of nuts, seeds, and legumes (cooked dried beans, lentils, and split peas) each week. A serving is 1/3 cup of nuts, 2 tablespoons of seeds, or ½ cup of cooked beans or peas. · Limit fats and oils to 2 to 3 servings each day. A serving is 1 teaspoon of vegetable oil or 2 tablespoons of salad dressing. · Limit sweets and added sugars to 5 servings or less a week. A serving is 1 tablespoon jelly or jam, ½ cup sorbet, or 1 cup of lemonade. · Eat less than 2,300 milligrams (mg) of sodium a day. If you limit your sodium to 1,500 mg a day, you can lower your blood pressure even more. Tips for success · Start small. Do not try to make dramatic changes to your diet all at once. You might feel that you are missing out on your favorite foods and then be more likely to not follow the plan. Make small changes, and stick with them. Once those changes become habit, add a few more changes. · Try some of the following: ¨ Make it a goal to eat a fruit or vegetable at every meal and at snacks. This will make it easy to get the recommended amount of fruits and vegetables each day. ¨ Try yogurt topped with fruit and nuts for a snack or healthy dessert. ¨ Add lettuce, tomato, cucumber, and onion to sandwiches. ¨ Combine a ready-made pizza crust with low-fat mozzarella cheese and lots of vegetable toppings. Try using tomatoes, squash, spinach, broccoli, carrots, cauliflower, and onions. ¨ Have a variety of cut-up vegetables with a low-fat dip as an appetizer instead of chips and dip. ¨ Sprinkle sunflower seeds or chopped almonds over salads. Or try adding chopped walnuts or almonds to cooked vegetables. ¨ Try some vegetarian meals using beans and peas. Add garbanzo or kidney beans to salads. Make burritos and tacos with mashed vásquez beans or black beans. Where can you learn more? Go to http://haven-magdi.info/. Enter G555 in the search box to learn more about \"DASH Diet: Care Instructions. \" Current as of: December 6, 2017 Content Version: 11.7 © 1045-1292 [employer]. Care instructions adapted under license by [employer] (which disclaims liability or warranty for this information). If you have questions about a medical condition or this instruction, always ask your healthcare professional. [place] [address] any warranty or liability for your use of this information. A Healthy Lifestyle: Care Instructions Your Care Instructions A healthy lifestyle can help you feel good, stay at a healthy weight, and have plenty of energy for both work and play. A healthy lifestyle is something you can share with your whole family. A healthy lifestyle also can lower your risk for serious health problems, such as high blood pressure, heart disease, and diabetes. You can follow a few steps listed below to improve your health and the health of your family. Follow-up care is a key part of your treatment and safety.  Be sure to make and go to all appointments, and call your doctor if you are having problems. It's also a good idea to know your test results and keep a list of the medicines you take. How can you care for yourself at home? · Do not eat too much sugar, fat, or fast foods. You can still have dessert and treats now and then. The goal is moderation. · Start small to improve your eating habits. Pay attention to portion sizes, drink less juice and soda pop, and eat more fruits and vegetables. ¨ Eat a healthy amount of food. A 3-ounce serving of meat, for example, is about the size of a deck of cards. Fill the rest of your plate with vegetables and whole grains. ¨ Limit the amount of soda and sports drinks you have every day. Drink more water when you are thirsty. ¨ Eat at least 5 servings of fruits and vegetables every day. It may seem like a lot, but it is not hard to reach this goal. A serving or helping is 1 piece of fruit, 1 cup of vegetables, or 2 cups of leafy, raw vegetables. Have an apple or some carrot sticks as an afternoon snack instead of a candy bar. Try to have fruits and/or vegetables at every meal. 
· Make exercise part of your daily routine. You may want to start with simple activities, such as walking, bicycling, or slow swimming. Try to be active 30 to 60 minutes every day. You do not need to do all 30 to 60 minutes all at once. For example, you can exercise 3 times a day for 10 or 20 minutes. Moderate exercise is safe for most people, but it is always a good idea to talk to your doctor before starting an exercise program. 
· Keep moving. Rosa Crigler the lawn, work in the garden, or AVIA. Take the stairs instead of the elevator at work. · If you smoke, quit. People who smoke have an increased risk for heart attack, stroke, cancer, and other lung illnesses. Quitting is hard, but there are ways to boost your chance of quitting tobacco for good. ¨ Use nicotine gum, patches, or lozenges. ¨ Ask your doctor about stop-smoking programs and medicines. ¨ Keep trying. In addition to reducing your risk of diseases in the future, you will notice some benefits soon after you stop using tobacco. If you have shortness of breath or asthma symptoms, they will likely get better within a few weeks after you quit. · Limit how much alcohol you drink. Moderate amounts of alcohol (up to 2 drinks a day for men, 1 drink a day for women) are okay. But drinking too much can lead to liver problems, high blood pressure, and other health problems. Family health If you have a family, there are many things you can do together to improve your health. · Eat meals together as a family as often as possible. · Eat healthy foods. This includes fruits, vegetables, lean meats and dairy, and whole grains. · Include your family in your fitness plan. Most people think of activities such as jogging or tennis as the way to fitness, but there are many ways you and your family can be more active. Anything that makes you breathe hard and gets your heart pumping is exercise. Here are some tips: 
¨ Walk to do errands or to take your child to school or the bus. ¨ Go for a family bike ride after dinner instead of watching TV. Where can you learn more? Go to http://haven-magdi.info/. Enter Y288 in the search box to learn more about \"A Healthy Lifestyle: Care Instructions. \" Current as of: December 7, 2017 Content Version: 11.7 © 2737-9273 [employer]. Care instructions adapted under license by [employer] (which disclaims liability or warranty for this information). If you have questions about a medical condition or this instruction, always ask your healthcare professional. [place] [address] any warranty or liability for your use of this information. Introducing [facility]!    
 [employer] introduces [employer] patient portal. Now you can access parts of your medical record, email your doctor's office, and request medication refills online. 1. In your internet browser, go to [email or web address] 2. Click on the First Time User? Click Here link in the Sign In box. You will see the New Member Sign Up page. 3. Enter your FX Bridge Access Code exactly as it appears below. You will not need to use this code after youve completed the sign-up process. If you do not sign up before the expiration date, you must request a new code. · FX Bridge Access Code: [email or web address] Expires: 12/11/2018 12:30 PM 
 
4. Enter the last four digits of your Social Security Number (xxxx) and Date of Birth (mm/dd/yyyy) as indicated and click Submit. You will be taken to the next sign-up page. 5. Create a FX Bridge ID. This will be your FX Bridge login ID and cannot be changed, so think of one that is secure and easy to remember. 6. Create a [employer] Bridge password. You can change your password at any time. 7. Enter your Password Reset Question and Answer. This can be used at a later time if you forget your password. 8. Enter your e-mail address. You will receive e-mail notification when new information is available in [address] 9. Click Sign Up. You can now view and download portions of your medical record. 10. Click the Download Summary menu link to download a portable copy of your medical information. If you have questions, please visit the Frequently Asked Questions section of the FX Bridge website. Remember, [employer] Bridge is NOT to be used for urgent needs. For medical emergencies, dial 911. Now available from your iPhone and Android! Please provide this summary of care documentation to your next provider. Your primary care clinician is listed as Calista Prasad. If you have any questions after today's visit, please call [phone].

## 2018-10-03 NOTE — PROGRESS NOTES
Chief Complaint   Patient presents with    Other     3  month f/u       HPI:  Patient is a 58year old  male with medical problems listed below presents today for follow up of GERD, Hypertension, Hyperlipidemia, Vit D def, etc. He was recently admitted at [facility] on 8/21/18 and was discharged on 8/24/18. During that admission, he presented with confusion, delirium, and altered mental status and was noted to have increased ammonia levels and was started on lactulose with improvement noted, as ammonia was much improved at 72 prior to discharge. He has been feeling well post discharge, though group home staff who accompanied him to today's visit  stated that he was recently noted to have dark stools and recent nausea and vomiting of darkish emesis. Otherwise, he has been feeling well and staff denies fever, chills, or other complaints today. In addition, she also stated that he has been taking his medications with no adverse effects noted.         Past Medical History:   Diagnosis Date    Constipation, chronic     Dyslipidemia 4/8/2014    Femur fracture ([address] 7/7/16    brace at right femur    GERD (gastroesophageal reflux disease)     Hypertension     MR (mental retardation), severe     Muscle weakness of lower extremity     with spasticity    Urinary incontinence due to cognitive impairment 8/23/2012       No Known Allergies      Current Outpatient Prescriptions   Medication Sig Dispense Refill    carvedilol (COREG) 3.125 mg tablet TAKE 1 TABLET BY MOUTH TWICE DAILY WITH MEALS (BKFST & SUPPER) FOR HTN 60 Tab 0    enalapril (VASOTEC) 10 mg tablet TAKE 1 TABLET BY MOUTH TWICE DAILY FOR HTN 60 Tab 0    ONE-A-DAY MEN'S MULTIVITAMIN 400- mcg tab TAKE 1 TABLET BY MOUTH ONCE DAILY 30 Tab 0    potassium chloride (KLOR-CON) 10 mEq tablet TAKE 1 TABLET BY MOUTH ONCE DAILY FOR LOW BLOOD LEVELS (MAY CRUSH ANDTAKE WITH APPLESAUCE) 30 Tab 0    raNITIdine (ZANTAC) 150 mg tablet TAKE 1 TABLET BY MOUTH TWICE DAILY 60 Tab 0    simvastatin (ZOCOR) 20 mg tablet TAKE 1 TABLET BY MOUTH NIGHTLY FOR DYSLIPIDEMIA 30 Tab 0    VITAMIN D2 50,000 unit capsule TAKE 1 CAPSULE BY MOUTH EVERY 7 DAYS 4 Cap 0    CARAFATE 100 mg/mL suspension TAKE 10ML (1GM) BY MOUTH FOUR TIMES DAILY FOR GERD 840 mL 0    benzonatate (TESSALON) 200 mg capsule 200 mg.      lactulose (CHRONULAC) 10 gram/15 mL solution 30 mL.  Swab (TOOTHETTE) swab Sig: Use as needed 600 Each 3    VITAMIN B-12 2,500 mcg sublingual tablet TAKE 1 TABLET BY MOUTH ONCE DAILY 100 Tab 0    sorbitol 70 % solution TAKE 30ML BY MOUTH TWICE DAILY FOR LAXATIVE 473 mL PRN    ferrous sulfate 220 mg (44 mg iron)/5 mL solution TAKE ONE TEASPOONFUL (220MG) BY MOUTH TWICE DAILY 473 mL 0    sucralfate (CARAFATE) 1 gram tablet TAKE 1 TABLET BY MOUTH FOUR TIMES DAILY AFTER MEALS AND AT BEDTIME FOR GERD 120 Tab 0    ferrous sulfate 300 mg (60 mg iron)/5 mL syrup TAKE ONE TEASPOONFUL (300MG) BY MOUTH TWICE DAILY 500 mL 0    enalapril (VASOTEC) 10 mg tablet TAKE 1 TABLET BY MOUTH TWICE DAILY FOR HTN 60 Tab 0    hydroCHLOROthiazide (HYDRODIURIL) 25 mg tablet TAKE 1 TABLET BY MOUTH ONCE DAILY 30 Tab 0    ondansetron (ZOFRAN ODT) 4 mg disintegrating tablet Take 1 Tab by mouth every eight (8) hours as needed for Nausea. 30 Tab 0    hydroCHLOROthiazide (HYDRODIURIL) 25 mg tablet TAKE 1 TABLET BY MOUTH ONCE DAILY 30 Tab 0    enalapril (VASOTEC) 10 mg tablet Take 1 Tab by mouth two (2) times a day. 30 Tab 0    DOK PLUS 8.6-50 mg per tablet TAKE 1 TABLET BY MOUTH TWICE DAILY FOR CHRONIC CONSTIPATION 60 Tab 3    acetaminophen (TYLENOL) 325 mg tablet 325 mg.  therapeutic multivitamin-minerals (THERAGRAN-M) tablet Take 1 Tab by mouth daily.  30 Tab 0    docosanol (ABREVA) 10 % topical cream Thin film 2 g 0          ROS:  Unable to review as pt mentally challenged        Physical Exam:  Visit Vitals    /65    Pulse 79    Temp 97.9 °F (36.6 °C) (Oral)    Resp 18    Ht 4' 10\" (1.473 m)    Wt 94 lb (42.6 kg)  Comment: patient confined to wheel chair    SpO2 98%    BMI 19.65 kg/m2     General: Black male in wheelchair, sleeping, afebrile, in no acute distress  Respiratory: symmetrical chest expansion, lung sounds clear bilaterally, good air entry, good respiratory effort, no wheezes or crackles  Cardiovascular: normal S1S2, regular rate and rhythm, no murmurs, pulses palpable, no thrill, no carotid or abdominal bruits, no JVD  Abdomen: soft, non-distended, normoactive bowel sounds x 4 quadrants. Extremity: No edema noted      Assessment/Plan:    ICD-10-CM ICD-9-CM    1. Essential hypertension I10 401.9 Controlled  CBC WITH AUTOMATED DIFF      METABOLIC PANEL, COMPREHENSIVE      TSH 3RD GENERATION   2. Gastroesophageal reflux disease without esophagitis K21.9 530.81    3. Dyslipidemia E78.5 272.4 LIPID PANEL   4. Vitamin D deficiency E55.9 268.9 VITAMIN D, 25 HYDROXY   5. Need for influenza vaccination Z23 V04.81 Flu shot given today   6. Dark stools R19.5 792.1 Staff was advised to avoid taking ASA or NSAID and will refer to GI  REFERRAL TO GASTROENTEROLOGY   7. Non-intractable vomiting with nausea, unspecified vomiting type R11.2 787.01 Zofran started today  ondansetron (ZOFRAN ODT) 4 mg disintegrating tablet   8. Encounter for long-term (current) use of medications Z79.899 V58.69 HEMOGLOBIN A1C W/O EAG   9.  Encounter for immunization Z23 V03.89 INFLUENZA VIRUS VAC QUAD,SPLIT,PRESV FREE SYRINGE IM      ADMIN INFLUENZA VIRUS VAC         Orders Placed This Encounter    Influenza virus vaccine (QUADRIVALENT PRES FREE SYRINGE) IM (06504)    CBC WITH AUTOMATED DIFF     Standing Status:   Future     Standing Expiration Date:   4/1/2019    HEMOGLOBIN A1C W/O EAG     Standing Status:   Future     Standing Expiration Date:   10/4/2019    LIPID PANEL     Standing Status:   Future     Standing Expiration Date:   1/3/2582    METABOLIC PANEL, COMPREHENSIVE     Standing Status:   Future     Standing Expiration Date:   4/1/2019    TSH 3RD GENERATION     Standing Status:   Future     Standing Expiration Date:   1/31/2019    VITAMIN D, 25 HYDROXY     Standing Status:   Future     Standing Expiration Date:   4/1/2019    REFERRAL TO GASTROENTEROLOGY     Referral Priority:   Routine     Referral Type:   Consultation     Referral Reason:   Specialty Services Required     Referred to Provider:   Cheryl Nugent MD    Administration fee () for Medicare insured patients    ondansetron (ZOFRAN ODT) 4 mg disintegrating tablet     Sig: Take 1 Tab by mouth every eight (8) hours as needed for Nausea. Dispense:  30 Tab     Refill:  0         Group home paperwork filled out      Review of records of recent hospital admission was done by me. Additional Notes: Discussed today's diagnosis, treatment plans. Discussed medication indications and side effects. After Visit Summary: Discussed provided printed patient instructions. Answered questions accordingly. Follow-up Disposition:  In 3 months with labs 1 week prior        [address] Dawson Downey DO, MPH  Internal Medicine

## 2018-10-15 RX ORDER — LACTULOSE 10 G/15ML
SOLUTION ORAL; RECTAL
Qty: 1892 ML | Refills: 0 | Status: SHIPPED | OUTPATIENT
Start: 2018-10-15 | End: 2018-12-26 | Stop reason: SDUPTHER

## 2018-11-02 DIAGNOSIS — K21.9 GASTROESOPHAGEAL REFLUX DISEASE WITHOUT ESOPHAGITIS: ICD-10-CM

## 2018-11-02 RX ORDER — SUCRALFATE 1 G/10ML
SUSPENSION ORAL
Qty: 840 ML | Refills: 0 | Status: SHIPPED | OUTPATIENT
Start: 2018-11-02 | End: 2018-12-04 | Stop reason: SDUPTHER

## 2018-12-27 ENCOUNTER — HOSPITAL ENCOUNTER (OUTPATIENT)
Dept: LAB | Age: 63
Discharge: HOME | End: 2018-12-27
Payer: MEDICARE

## 2018-12-27 DIAGNOSIS — I10 ESSENTIAL HYPERTENSION: ICD-10-CM

## 2018-12-27 DIAGNOSIS — E55.9 VITAMIN D DEFICIENCY: ICD-10-CM

## 2018-12-27 DIAGNOSIS — E78.5 DYSLIPIDEMIA: ICD-10-CM

## 2018-12-27 DIAGNOSIS — Z79.899 ENCOUNTER FOR LONG-TERM (CURRENT) USE OF MEDICATIONS: ICD-10-CM

## 2018-12-27 LAB
ALBUMIN SERPL-MCNC: 3.5 G/DL (ref 3.4–5)
ALBUMIN/GLOB SERPL: 0.9 {RATIO} (ref 0.8–1.7)
ALP SERPL-CCNC: 90 U/L (ref 45–117)
ALT SERPL-CCNC: 37 U/L (ref 16–61)
ANION GAP SERPL CALC-SCNC: 7 MMOL/L (ref 3–18)
AST SERPL-CCNC: 28 U/L (ref 15–37)
BASOPHILS # BLD: 0 K/UL (ref 0–0.1)
BASOPHILS NFR BLD: 1 % (ref 0–2)
BILIRUB SERPL-MCNC: 0.2 MG/DL (ref 0.2–1)
BUN SERPL-MCNC: 9 MG/DL (ref 7–18)
BUN/CREAT SERPL: 12 (ref 12–20)
CALCIUM SERPL-MCNC: 8.9 MG/DL (ref 8.5–10.1)
CHLORIDE SERPL-SCNC: 112 MMOL/L (ref 100–108)
CHOLEST SERPL-MCNC: 121 MG/DL
CO2 SERPL-SCNC: 25 MMOL/L (ref 21–32)
CREAT SERPL-MCNC: 0.74 MG/DL (ref 0.6–1.3)
DIFFERENTIAL METHOD BLD: ABNORMAL
EOSINOPHIL # BLD: 0.3 K/UL (ref 0–0.4)
EOSINOPHIL NFR BLD: 6 % (ref 0–5)
ERYTHROCYTE [DISTWIDTH] IN BLOOD BY AUTOMATED COUNT: 19.8 % (ref 11.6–14.5)
GLOBULIN SER CALC-MCNC: 4.1 G/DL (ref 2–4)
GLUCOSE SERPL-MCNC: 104 MG/DL (ref 74–99)
HBA1C MFR BLD: 4.9 % (ref 4.2–5.6)
HCT VFR BLD AUTO: 42.3 % (ref 36–48)
HDLC SERPL-MCNC: 39 MG/DL (ref 40–60)
HDLC SERPL: 3.1 {RATIO} (ref 0–5)
HGB BLD-MCNC: 12.5 G/DL (ref 13–16)
LDLC SERPL CALC-MCNC: 64.4 MG/DL (ref 0–100)
LIPID PROFILE,FLP: ABNORMAL
LYMPHOCYTES # BLD: 1.1 K/UL (ref 0.9–3.6)
LYMPHOCYTES NFR BLD: 22 % (ref 21–52)
MCH RBC QN AUTO: 24 PG (ref 24–34)
MCHC RBC AUTO-ENTMCNC: 29.6 G/DL (ref 31–37)
MCV RBC AUTO: 81.2 FL (ref 74–97)
MONOCYTES # BLD: 0.5 K/UL (ref 0.05–1.2)
MONOCYTES NFR BLD: 10 % (ref 3–10)
NEUTS SEG # BLD: 3.1 K/UL (ref 1.8–8)
NEUTS SEG NFR BLD: 61 % (ref 40–73)
PLATELET # BLD AUTO: 228 K/UL (ref 135–420)
POTASSIUM SERPL-SCNC: 3.7 MMOL/L (ref 3.5–5.5)
PROT SERPL-MCNC: 7.6 G/DL (ref 6.4–8.2)
RBC # BLD AUTO: 5.21 M/UL (ref 4.7–5.5)
SODIUM SERPL-SCNC: 144 MMOL/L (ref 136–145)
TRIGL SERPL-MCNC: 88 MG/DL (ref ?–150)
TSH SERPL DL<=0.05 MIU/L-ACNC: 1.5 UIU/ML (ref 0.36–3.74)
VLDLC SERPL CALC-MCNC: 17.6 MG/DL
WBC # BLD AUTO: 5.1 K/UL (ref 4.6–13.2)

## 2018-12-27 PROCEDURE — 80061 LIPID PANEL: CPT

## 2018-12-27 PROCEDURE — 84443 ASSAY THYROID STIM HORMONE: CPT

## 2018-12-27 PROCEDURE — 36415 COLL VENOUS BLD VENIPUNCTURE: CPT

## 2018-12-27 PROCEDURE — 85025 COMPLETE CBC W/AUTO DIFF WBC: CPT

## 2018-12-27 PROCEDURE — 83036 HEMOGLOBIN GLYCOSYLATED A1C: CPT

## 2018-12-27 PROCEDURE — 80053 COMPREHEN METABOLIC PANEL: CPT

## 2018-12-27 PROCEDURE — 82306 VITAMIN D 25 HYDROXY: CPT

## 2018-12-28 LAB — 25(OH)D3 SERPL-MCNC: 110.4 NG/ML (ref 30–100)

## 2019-01-23 ENCOUNTER — OFFICE VISIT (OUTPATIENT)
Dept: FAMILY MEDICINE CLINIC | Age: 64
End: 2019-01-23

## 2019-01-23 VITALS
TEMPERATURE: 98.3 F | BODY MASS INDEX: 19.65 KG/M2 | HEIGHT: 60 IN | DIASTOLIC BLOOD PRESSURE: 80 MMHG | OXYGEN SATURATION: 94 % | RESPIRATION RATE: 18 BRPM | SYSTOLIC BLOOD PRESSURE: 128 MMHG | HEART RATE: 71 BPM

## 2019-01-23 DIAGNOSIS — E87.6 HYPOKALEMIA: ICD-10-CM

## 2019-01-23 DIAGNOSIS — R11.2 NON-INTRACTABLE VOMITING WITH NAUSEA, UNSPECIFIED VOMITING TYPE: ICD-10-CM

## 2019-01-23 DIAGNOSIS — K21.9 GASTROESOPHAGEAL REFLUX DISEASE WITHOUT ESOPHAGITIS: ICD-10-CM

## 2019-01-23 DIAGNOSIS — Z79.899 ENCOUNTER FOR LONG-TERM (CURRENT) USE OF MEDICATIONS: Primary | ICD-10-CM

## 2019-01-23 DIAGNOSIS — E78.5 DYSLIPIDEMIA: ICD-10-CM

## 2019-01-23 DIAGNOSIS — K59.09 CONSTIPATION, CHRONIC: ICD-10-CM

## 2019-01-23 RX ORDER — SIMVASTATIN 20 MG/1
TABLET, FILM COATED ORAL
Qty: 30 TAB | Refills: 3 | Status: SHIPPED | OUTPATIENT
Start: 2019-01-23 | End: 2019-06-07 | Stop reason: SDUPTHER

## 2019-01-23 RX ORDER — ERGOCALCIFEROL 1.25 MG/1
CAPSULE ORAL
Qty: 4 CAP | Refills: 3 | Status: SHIPPED | OUTPATIENT
Start: 2019-01-23 | End: 2019-01-23 | Stop reason: CLARIF

## 2019-01-23 RX ORDER — SUCRALFATE 1 G/10ML
SUSPENSION ORAL
Qty: 840 ML | Refills: 0 | Status: SHIPPED | OUTPATIENT
Start: 2019-01-23 | End: 2019-05-29 | Stop reason: SDUPTHER

## 2019-01-23 RX ORDER — ENALAPRIL MALEATE 10 MG/1
10 TABLET ORAL 2 TIMES DAILY
Qty: 30 TAB | Refills: 0 | Status: SHIPPED | OUTPATIENT
Start: 2019-01-23 | End: 2019-05-29 | Stop reason: SDUPTHER

## 2019-01-23 RX ORDER — FERROUS SULFATE 220 (44)/5
ELIXIR ORAL
Qty: 473 ML | Refills: 0 | Status: SHIPPED | OUTPATIENT
Start: 2019-01-23 | End: 2019-05-01 | Stop reason: SDUPTHER

## 2019-01-23 RX ORDER — SORBITOL SOLUTION 70 %
SOLUTION, ORAL MISCELLANEOUS
Qty: 473 ML | Status: SHIPPED | OUTPATIENT
Start: 2019-01-23 | End: 2019-05-01 | Stop reason: SDUPTHER

## 2019-01-23 RX ORDER — VITAMIN B COMPLEX
TABLET ORAL
Qty: 100 TAB | Refills: 0 | Status: SHIPPED | OUTPATIENT
Start: 2019-01-23 | End: 2019-03-14 | Stop reason: SDUPTHER

## 2019-01-23 RX ORDER — BENZONATATE 200 MG/1
200 CAPSULE ORAL
Qty: 90 CAP | Refills: 1 | Status: SHIPPED | OUTPATIENT
Start: 2019-01-23 | End: 2019-05-01 | Stop reason: SDUPTHER

## 2019-01-23 RX ORDER — LACTULOSE 10 G/15ML
SOLUTION ORAL; RECTAL
Qty: 1892 ML | Refills: 0 | Status: SHIPPED | OUTPATIENT
Start: 2019-01-23 | End: 2019-05-01 | Stop reason: SDUPTHER

## 2019-01-23 RX ORDER — AMOXICILLIN 250 MG
CAPSULE ORAL
Qty: 60 TAB | Refills: 3 | Status: SHIPPED | OUTPATIENT
Start: 2019-01-23 | End: 2019-05-01 | Stop reason: SDUPTHER

## 2019-01-23 RX ORDER — CARVEDILOL 3.12 MG/1
TABLET ORAL
Qty: 60 TAB | Refills: 3 | Status: SHIPPED | OUTPATIENT
Start: 2019-01-23 | End: 2019-05-01 | Stop reason: SDUPTHER

## 2019-01-23 RX ORDER — ONDANSETRON 4 MG/1
4 TABLET, ORALLY DISINTEGRATING ORAL
Qty: 30 TAB | Refills: 0 | Status: SHIPPED | OUTPATIENT
Start: 2019-01-23 | End: 2019-04-05 | Stop reason: SDUPTHER

## 2019-01-23 RX ORDER — POTASSIUM CHLORIDE 750 MG/1
TABLET, EXTENDED RELEASE ORAL
Qty: 30 TAB | Refills: 0 | Status: SHIPPED | OUTPATIENT
Start: 2019-01-23 | End: 2019-05-29 | Stop reason: ALTCHOICE

## 2019-01-23 NOTE — PATIENT INSTRUCTIONS
DASH Diet: Care Instructions Your Care Instructions The DASH diet is an eating plan that can help lower your blood pressure. DASH stands for Dietary Approaches to Stop Hypertension. Hypertension is high blood pressure. The DASH diet focuses on eating foods that are high in calcium, potassium, and magnesium. These nutrients can lower blood pressure. The foods that are highest in these nutrients are fruits, vegetables, low-fat dairy products, nuts, seeds, and legumes. But taking calcium, potassium, and magnesium supplements instead of eating foods that are high in those nutrients does not have the same effect. The DASH diet also includes whole grains, fish, and poultry. The DASH diet is one of several lifestyle changes your doctor may recommend to lower your high blood pressure. Your doctor may also want you to decrease the amount of sodium in your diet. Lowering sodium while following the DASH diet can lower blood pressure even further than just the DASH diet alone. Follow-up care is a key part of your treatment and safety. Be sure to make and go to all appointments, and call your doctor if you are having problems. It's also a good idea to know your test results and keep a list of the medicines you take. How can you care for yourself at home? Following the DASH diet · Eat 4 to 5 servings of fruit each day. A serving is 1 medium-sized piece of fruit, ½ cup chopped or canned fruit, 1/4 cup dried fruit, or 4 ounces (½ cup) of fruit juice. Choose fruit more often than fruit juice. · Eat 4 to 5 servings of vegetables each day. A serving is 1 cup of lettuce or raw leafy vegetables, ½ cup of chopped or cooked vegetables, or 4 ounces (½ cup) of vegetable juice. Choose vegetables more often than vegetable juice. · Get 2 to 3 servings of low-fat and fat-free dairy each day. A serving is 8 ounces of milk, 1 cup of yogurt, or 1 ½ ounces of cheese. · Eat 6 to 8 servings of grains each day. A serving is 1 slice of bread, 1 ounce of dry cereal, or ½ cup of cooked rice, pasta, or cooked cereal. Try to choose whole-grain products as much as possible. · Limit lean meat, poultry, and fish to 2 servings each day. A serving is 3 ounces, about the size of a deck of cards. · Eat 4 to 5 servings of nuts, seeds, and legumes (cooked dried beans, lentils, and split peas) each week. A serving is 1/3 cup of nuts, 2 tablespoons of seeds, or ½ cup of cooked beans or peas. · Limit fats and oils to 2 to 3 servings each day. A serving is 1 teaspoon of vegetable oil or 2 tablespoons of salad dressing. · Limit sweets and added sugars to 5 servings or less a week. A serving is 1 tablespoon jelly or jam, ½ cup sorbet, or 1 cup of lemonade. · Eat less than 2,300 milligrams (mg) of sodium a day. If you limit your sodium to 1,500 mg a day, you can lower your blood pressure even more. Tips for success · Start small. Do not try to make dramatic changes to your diet all at once. You might feel that you are missing out on your favorite foods and then be more likely to not follow the plan. Make small changes, and stick with them. Once those changes become habit, add a few more changes. · Try some of the following: ? Make it a goal to eat a fruit or vegetable at every meal and at snacks. This will make it easy to get the recommended amount of fruits and vegetables each day. ? Try yogurt topped with fruit and nuts for a snack or healthy dessert. ? Add lettuce, tomato, cucumber, and onion to sandwiches. ? Combine a ready-made pizza crust with low-fat mozzarella cheese and lots of vegetable toppings. Try using tomatoes, squash, spinach, broccoli, carrots, cauliflower, and onions. ? Have a variety of cut-up vegetables with a low-fat dip as an appetizer instead of chips and dip. ? Sprinkle sunflower seeds or chopped almonds over salads.  Or try adding chopped walnuts or almonds to cooked vegetables. ? Try some vegetarian meals using beans and peas. Add garbanzo or kidney beans to salads. Make burritos and tacos with mashed vásquez beans or black beans. Where can you learn more? Go to http://haven-magdi.info/. Enter W755 in the search box to learn more about \"DASH Diet: Care Instructions. \" Current as of: July 22, 2018 Content Version: 11.9 © 2103-2941 [employer]. Care instructions adapted under license by [employer] (which disclaims liability or warranty for this information). If you have questions about a medical condition or this instruction, always ask your healthcare professional. [address] any warranty or liability for your use of this information.

## 2019-01-23 NOTE — PROGRESS NOTES
Sai Aguilera is a  58 y.o. male presents today for office visit for routine follow up. 1. Have you been to the ER, urgent care clinic or hospitalized since your last visit? YES  11- [facility] Emergency Department 2. Have you seen or consulted any other health care providers outside of the [address] [place] [address] since your last visit (Include any pap smears or colon screening)? NO

## 2019-01-24 ENCOUNTER — TELEPHONE (OUTPATIENT)
Dept: FAMILY MEDICINE CLINIC | Age: 64
End: 2019-01-24

## 2019-01-24 NOTE — TELEPHONE ENCOUNTER
Emilia Gannon from [facility] is calling b/c the vitamin D 2 was discontinued but she did not receive the discontinuation order. Please advise.    [phone])  [phone](J)

## 2019-03-05 ENCOUNTER — TELEPHONE (OUTPATIENT)
Dept: FAMILY MEDICINE CLINIC | Age: 64
End: 2019-03-05

## 2019-03-05 NOTE — TELEPHONE ENCOUNTER
Sury Venegas with [facility] would like to know if a copy of the script for Vit D 50,000 being D/C on 12/1/18 could be faxed over .     Fax # [phone]    pls Advise

## 2019-04-05 DIAGNOSIS — R11.2 NON-INTRACTABLE VOMITING WITH NAUSEA, UNSPECIFIED VOMITING TYPE: ICD-10-CM

## 2019-04-05 RX ORDER — ONDANSETRON 4 MG/1
4 TABLET, ORALLY DISINTEGRATING ORAL
Qty: 30 TAB | Refills: 0 | Status: SHIPPED | OUTPATIENT
Start: 2019-04-05 | End: 2019-05-01 | Stop reason: SDUPTHER

## 2019-04-05 NOTE — TELEPHONE ENCOUNTER
Leno Zimmerman from [facility] called requesting a DC order on the patients Potassium Chloride. She asked for it to be faxed to [phone]. Please advise. Requested Prescriptions     Pending Prescriptions Disp Refills    ondansetron (ZOFRAN ODT) 4 mg disintegrating tablet 30 Tab 0     Sig: Take 1 Tab by mouth every eight (8) hours as needed for Nausea.

## 2019-05-01 ENCOUNTER — OFFICE VISIT (OUTPATIENT)
Dept: FAMILY MEDICINE CLINIC | Age: 64
End: 2019-05-01

## 2019-05-01 VITALS
SYSTOLIC BLOOD PRESSURE: 104 MMHG | RESPIRATION RATE: 18 BRPM | OXYGEN SATURATION: 94 % | DIASTOLIC BLOOD PRESSURE: 69 MMHG | HEIGHT: 60 IN | BODY MASS INDEX: 19.65 KG/M2 | TEMPERATURE: 97.9 F | HEART RATE: 63 BPM

## 2019-05-01 DIAGNOSIS — R11.2 NON-INTRACTABLE VOMITING WITH NAUSEA, UNSPECIFIED VOMITING TYPE: ICD-10-CM

## 2019-05-01 DIAGNOSIS — K59.09 CONSTIPATION, CHRONIC: ICD-10-CM

## 2019-05-01 RX ORDER — FERROUS SULFATE 220 (44)/5
ELIXIR ORAL
Qty: 473 ML | Refills: 3 | Status: SHIPPED | OUTPATIENT
Start: 2019-05-01 | End: 2020-02-10 | Stop reason: SDUPTHER

## 2019-05-01 RX ORDER — SORBITOL SOLUTION 70 %
SOLUTION, ORAL MISCELLANEOUS
Qty: 473 ML | Status: SHIPPED | OUTPATIENT
Start: 2019-05-01 | End: 2021-02-06

## 2019-05-01 RX ORDER — BENZONATATE 200 MG/1
200 CAPSULE ORAL
Qty: 90 CAP | Refills: 1 | Status: SHIPPED | OUTPATIENT
Start: 2019-05-01 | End: 2020-04-02 | Stop reason: SDUPTHER

## 2019-05-01 RX ORDER — CARVEDILOL 3.12 MG/1
TABLET ORAL
Qty: 60 TAB | Refills: 3 | Status: SHIPPED | OUTPATIENT
Start: 2019-05-01 | End: 2019-09-05 | Stop reason: SDUPTHER

## 2019-05-01 RX ORDER — POTASSIUM CHLORIDE 750 MG/1
TABLET, EXTENDED RELEASE ORAL
Qty: 30 TAB | Refills: 3 | Status: SHIPPED | OUTPATIENT
Start: 2019-05-01 | End: 2019-05-29 | Stop reason: ALTCHOICE

## 2019-05-01 RX ORDER — VITAMIN B COMPLEX
TABLET ORAL
Qty: 90 TAB | Refills: 2 | Status: SHIPPED | OUTPATIENT
Start: 2019-05-01

## 2019-05-01 RX ORDER — PHENOL/SODIUM PHENOLATE
20 AEROSOL, SPRAY (ML) MUCOUS MEMBRANE DAILY
Qty: 30 TAB | Refills: 3 | Status: SHIPPED | OUTPATIENT
Start: 2019-05-01 | End: 2019-09-05 | Stop reason: SDUPTHER

## 2019-05-01 RX ORDER — AMOXICILLIN 250 MG
CAPSULE ORAL
Qty: 60 TAB | Refills: 3 | Status: SHIPPED | OUTPATIENT
Start: 2019-05-01 | End: 2019-09-05 | Stop reason: SDUPTHER

## 2019-05-01 RX ORDER — ENALAPRIL MALEATE 10 MG/1
TABLET ORAL
Qty: 60 TAB | Refills: 4 | Status: SHIPPED | OUTPATIENT
Start: 2019-05-01 | End: 2019-10-21 | Stop reason: SDUPTHER

## 2019-05-01 RX ORDER — LACTULOSE 10 G/15ML
SOLUTION ORAL; RECTAL
Qty: 1892 ML | Refills: 2 | Status: SHIPPED | OUTPATIENT
Start: 2019-05-01 | End: 2019-10-15 | Stop reason: SDUPTHER

## 2019-05-01 RX ORDER — ONDANSETRON 4 MG/1
4 TABLET, ORALLY DISINTEGRATING ORAL
Qty: 30 TAB | Refills: 3 | Status: SHIPPED | OUTPATIENT
Start: 2019-05-01 | End: 2021-02-06

## 2019-05-01 NOTE — PATIENT INSTRUCTIONS
Anemia: Care Instructions  Your Care Instructions    Anemia is a low level of red blood cells, which carry oxygen throughout your body. Many things can cause anemia. Lack of iron is one of the most common causes. Your body needs iron to make hemoglobin, a substance in red blood cells that carries oxygen from the lungs to your body's cells. Without enough iron, the body produces fewer and smaller red blood cells. As a result, your body's cells do not get enough oxygen, and you feel tired and weak. And you may have trouble concentrating. Bleeding is the most common cause of a lack of iron. You may have heavy menstrual bleeding or bleeding caused by conditions such as ulcers, hemorrhoids, or cancer. Regular use of aspirin or other anti-inflammatory medicines (such as ibuprofen) also can cause bleeding in some people. A lack of iron in your diet also can cause anemia, especially at times when the body needs more iron, such as during pregnancy, infancy, and the teen years. Your doctor may have prescribed iron pills. It may take several months of treatment for your iron levels to return to normal. Your doctor also may suggest that you eat foods that are rich in iron, such as meat and beans. There are many other causes of anemia. It is not always due to a lack of iron. Finding the specific cause of your anemia will help your doctor find the right treatment for you. Follow-up care is a key part of your treatment and safety. Be sure to make and go to all appointments, and call your doctor if you are having problems. It's also a good idea to know your test results and keep a list of the medicines you take. How can you care for yourself at home? · Take your medicines exactly as prescribed. Call your doctor if you think you are having a problem with your medicine. · If your doctor recommends iron pills, take them as directed:  ? Try to take the pills on an empty stomach about 1 hour before or 2 hours after meals. But you may need to take iron with food to avoid an upset stomach. ? Do not take antacids or drink milk or caffeine drinks (such as coffee, tea, or cola) at the same time or within 2 hours of the time that you take your iron. They can make it hard for your body to absorb the iron. ? Vitamin C (from food or supplements) helps your body absorb iron. Try taking iron pills with a glass of orange juice or some other food that is high in vitamin C, such as citrus fruits. ? Iron pills may cause stomach problems, such as heartburn, nausea, diarrhea, constipation, and cramps. Be sure to drink plenty of fluids, and include fruits, vegetables, and fiber in your diet each day. Iron pills often make your bowel movements dark or green. ? If you forget to take an iron pill, do not take a double dose of iron the next time you take a pill. ? Keep iron pills out of the reach of small children. An overdose of iron can be very dangerous. · Follow your doctor's advice about eating iron-rich foods. These include red meat, shellfish, poultry, eggs, beans, raisins, whole-grain bread, and leafy green vegetables. · Steam vegetables to help them keep their iron content. When should you call for help? Call 911 anytime you think you may need emergency care. For example, call if:    · You have symptoms of a heart attack. These may include:  ? Chest pain or pressure, or a strange feeling in the chest.  ? Sweating. ? Shortness of breath. ? Nausea or vomiting. ? Pain, pressure, or a strange feeling in the back, neck, jaw, or upper belly or in one or both shoulders or arms. ? Lightheadedness or sudden weakness. ? A fast or irregular heartbeat. After you call 911, the  may tell you to chew 1 adult-strength or 2 to 4 low-dose aspirin. Wait for an ambulance.  Do not try to drive yourself.     · You passed out (lost consciousness).    Call your doctor now or seek immediate medical care if:    · You have new or increased shortness of breath.     · You are dizzy or lightheaded, or you feel like you may faint.     · Your fatigue and weakness continue or get worse.     · You have any abnormal bleeding, such as:  ? Nosebleeds. ? Vaginal bleeding that is different (heavier, more frequent, at a different time of the month) than what you are used to.  ? Bloody or black stools, or rectal bleeding. ? Bloody or pink urine.    Watch closely for changes in your health, and be sure to contact your doctor if:    · You do not get better as expected. Where can you learn more? Go to http://haven-magdi.info/. Enter R301 in the search box to learn more about \"Anemia: Care Instructions. \"  Current as of: May 6, 2018  Content Version: 11.9  © 5520-4399 [employer]. Care instructions adapted under license by [employer] (which disclaims liability or warranty for this information). If you have questions about a medical condition or this instruction, always ask your healthcare professional. [place] [address] any warranty or liability for your use of this information. High Blood Pressure: Care Instructions  Overview    It's normal for blood pressure to go up and down throughout the day. But if it stays up, you have high blood pressure. Another name for high blood pressure is hypertension. Despite what a lot of people think, high blood pressure usually doesn't cause headaches or make you feel dizzy or lightheaded. It usually has no symptoms. But it does increase your risk of stroke, heart attack, and other problems. You and your doctor will talk about your risks of these problems based on your blood pressure. Your doctor will give you a goal for your blood pressure. Your goal will be based on your health and your age. Lifestyle changes, such as eating healthy and being active, are always important to help lower blood pressure.  You might also take medicine to reach your blood pressure goal.  Follow-up care is a key part of your treatment and safety. Be sure to make and go to all appointments, and call your doctor if you are having problems. It's also a good idea to know your test results and keep a list of the medicines you take. How can you care for yourself at home? Medical treatment  · If you stop taking your medicine, your blood pressure will go back up. You may take one or more types of medicine to lower your blood pressure. Be safe with medicines. Take your medicine exactly as prescribed. Call your doctor if you think you are having a problem with your medicine. · Talk to your doctor before you start taking aspirin every day. Aspirin can help certain people lower their risk of a heart attack or stroke. But taking aspirin isn't right for everyone, because it can cause serious bleeding. · See your doctor regularly. You may need to see the doctor more often at first or until your blood pressure comes down. · If you are taking blood pressure medicine, talk to your doctor before you take decongestants or anti-inflammatory medicine, such as ibuprofen. Some of these medicines can raise blood pressure. · Learn how to check your blood pressure at home. Lifestyle changes  · Stay at a healthy weight. This is especially important if you put on weight around the waist. Losing even 10 pounds can help you lower your blood pressure. · If your doctor recommends it, get more exercise. Walking is a good choice. Bit by bit, increase the amount you walk every day. Try for at least 30 minutes on most days of the week. You also may want to swim, bike, or do other activities. · Avoid or limit alcohol. Talk to your doctor about whether you can drink any alcohol. · Try to limit how much sodium you eat to less than 2,300 milligrams (mg) a day. Your doctor may ask you to try to eat less than 1,500 mg a day.   · Eat plenty of fruits (such as bananas and oranges), vegetables, legumes, whole grains, and low-fat dairy products. · Lower the amount of saturated fat in your diet. Saturated fat is found in animal products such as milk, cheese, and meat. Limiting these foods may help you lose weight and also lower your risk for heart disease. · Do not smoke. Smoking increases your risk for heart attack and stroke. If you need help quitting, talk to your doctor about stop-smoking programs and medicines. These can increase your chances of quitting for good. When should you call for help? Call 911 anytime you think you may need emergency care. This may mean having symptoms that suggest that your blood pressure is causing a serious heart or blood vessel problem. Your blood pressure may be over 180/120.   For example, call 911 if:    · You have symptoms of a heart attack. These may include:  ? Chest pain or pressure, or a strange feeling in the chest.  ? Sweating. ? Shortness of breath. ? Nausea or vomiting. ? Pain, pressure, or a strange feeling in the back, neck, jaw, or upper belly or in one or both shoulders or arms. ? Lightheadedness or sudden weakness. ? A fast or irregular heartbeat.     · You have symptoms of a stroke. These may include:  ? Sudden numbness, tingling, weakness, or loss of movement in your face, arm, or leg, especially on only one side of your body. ? Sudden vision changes. ? Sudden trouble speaking. ? Sudden confusion or trouble understanding simple statements. ? Sudden problems with walking or balance. ? A sudden, severe headache that is different from past headaches.     · You have severe back or belly pain.    Do not wait until your blood pressure comes down on its own.  Get help right away.   Call your doctor now or seek immediate care if:    · Your blood pressure is much higher than normal (such as 180/120 or higher), but you don't have symptoms.     · You think high blood pressure is causing symptoms, such as:  ? Severe headache.  ? Blurry vision.    Watch closely for changes in your health, and be sure to contact your doctor if:    · Your blood pressure measures higher than your doctor recommends at least 2 times. That means the top number is higher or the bottom number is higher, or both.     · You think you may be having side effects from your blood pressure medicine. Where can you learn more? Go to http://haven-magdi.info/. Enter Z619 in the search box to learn more about \"High Blood Pressure: Care Instructions. \"  Current as of: July 22, 2018  Content Version: 11.9  © 1713-8056 [employer]. Care instructions adapted under license by [employer] (which disclaims liability or warranty for this information). If you have questions about a medical condition or this instruction, always ask your healthcare professional. [address] any warranty or liability for your use of this information.

## 2019-05-01 NOTE — PROGRESS NOTES
Chief Complaint   Patient presents with    Complete Physical     1. Have you been to the ER, urgent care clinic since your last visit? Hospitalized since your last visit? No    2. Have you seen or consulted any other health care providers outside of the [address] [place] [address] since your last visit? Include any pap smears or colon screening.  No

## 2019-05-29 ENCOUNTER — HOSPITAL ENCOUNTER (OUTPATIENT)
Dept: LAB | Age: 64
Discharge: HOME | End: 2019-05-29
Payer: MEDICARE

## 2019-05-29 ENCOUNTER — OFFICE VISIT (OUTPATIENT)
Dept: FAMILY MEDICINE CLINIC | Age: 64
End: 2019-05-29

## 2019-05-29 VITALS
TEMPERATURE: 97.8 F | HEIGHT: 60 IN | DIASTOLIC BLOOD PRESSURE: 76 MMHG | HEART RATE: 72 BPM | RESPIRATION RATE: 18 BRPM | SYSTOLIC BLOOD PRESSURE: 112 MMHG | BODY MASS INDEX: 19.65 KG/M2 | OXYGEN SATURATION: 100 %

## 2019-05-29 DIAGNOSIS — Z13.31 SCREENING FOR DEPRESSION: ICD-10-CM

## 2019-05-29 DIAGNOSIS — Z13.39 SCREENING FOR ALCOHOLISM: ICD-10-CM

## 2019-05-29 DIAGNOSIS — Z79.899 ENCOUNTER FOR LONG-TERM (CURRENT) USE OF MEDICATIONS: ICD-10-CM

## 2019-05-29 DIAGNOSIS — Z00.00 MEDICARE ANNUAL WELLNESS VISIT, SUBSEQUENT: ICD-10-CM

## 2019-05-29 LAB
ALBUMIN SERPL-MCNC: 3.4 G/DL (ref 3.4–5)
ALBUMIN/GLOB SERPL: 0.9 {RATIO} (ref 0.8–1.7)
ALP SERPL-CCNC: 95 U/L (ref 45–117)
ALT SERPL-CCNC: 39 U/L (ref 16–61)
ANION GAP SERPL CALC-SCNC: 7 MMOL/L (ref 3–18)
AST SERPL-CCNC: 28 U/L (ref 15–37)
BASOPHILS # BLD: 0.1 K/UL (ref 0–0.1)
BASOPHILS NFR BLD: 1 % (ref 0–2)
BILIRUB SERPL-MCNC: 0.2 MG/DL (ref 0.2–1)
BUN SERPL-MCNC: 16 MG/DL (ref 7–18)
BUN/CREAT SERPL: 19 (ref 12–20)
CALCIUM SERPL-MCNC: 8.5 MG/DL (ref 8.5–10.1)
CHLORIDE SERPL-SCNC: 111 MMOL/L (ref 100–108)
CO2 SERPL-SCNC: 25 MMOL/L (ref 21–32)
CREAT SERPL-MCNC: 0.85 MG/DL (ref 0.6–1.3)
DIFFERENTIAL METHOD BLD: ABNORMAL
EOSINOPHIL # BLD: 0.4 K/UL (ref 0–0.4)
EOSINOPHIL NFR BLD: 4 % (ref 0–5)
ERYTHROCYTE [DISTWIDTH] IN BLOOD BY AUTOMATED COUNT: 18.2 % (ref 11.6–14.5)
GLOBULIN SER CALC-MCNC: 3.6 G/DL (ref 2–4)
GLUCOSE SERPL-MCNC: 102 MG/DL (ref 74–99)
HCT VFR BLD AUTO: 35.9 % (ref 36–48)
HGB BLD-MCNC: 10 G/DL (ref 13–16)
LYMPHOCYTES # BLD: 1.2 K/UL (ref 0.9–3.6)
LYMPHOCYTES NFR BLD: 12 % (ref 21–52)
MCH RBC QN AUTO: 22.6 PG (ref 24–34)
MCHC RBC AUTO-ENTMCNC: 27.9 G/DL (ref 31–37)
MCV RBC AUTO: 81 FL (ref 74–97)
MONOCYTES # BLD: 1 K/UL (ref 0.05–1.2)
MONOCYTES NFR BLD: 10 % (ref 3–10)
NEUTS SEG # BLD: 7.6 K/UL (ref 1.8–8)
NEUTS SEG NFR BLD: 73 % (ref 40–73)
PLATELET # BLD AUTO: 424 K/UL (ref 135–420)
PMV BLD AUTO: 10.8 FL (ref 9.2–11.8)
POTASSIUM SERPL-SCNC: 4.4 MMOL/L (ref 3.5–5.5)
PROT SERPL-MCNC: 7 G/DL (ref 6.4–8.2)
RBC # BLD AUTO: 4.43 M/UL (ref 4.7–5.5)
SODIUM SERPL-SCNC: 143 MMOL/L (ref 136–145)
WBC # BLD AUTO: 10.2 K/UL (ref 4.6–13.2)

## 2019-05-29 PROCEDURE — 80053 COMPREHEN METABOLIC PANEL: CPT

## 2019-05-29 PROCEDURE — 85025 COMPLETE CBC W/AUTO DIFF WBC: CPT

## 2019-05-29 PROCEDURE — 36415 COLL VENOUS BLD VENIPUNCTURE: CPT

## 2019-05-29 NOTE — PATIENT INSTRUCTIONS
Medicare Wellness Visit, Male The best way to live healthy is to have a lifestyle where you eat a well-balanced diet, exercise regularly, limit alcohol use, and quit all forms of tobacco/nicotine, if applicable. Regular preventive services are another way to keep healthy. Preventive services (vaccines, screening tests, monitoring & exams) can help personalize your care plan, which helps you manage your own care. Screening tests can find health problems at the earliest stages, when they are easiest to treat. [address] [facility] follows the current, evidence-based guidelines published by the [facility] Fabian Russ ([facility]) when recommending preventive services for our patients. Because we follow these guidelines, sometimes recommendations change over time as research supports it. (For example, a prostate screening blood test is no longer routinely recommended for men with no symptoms.) Of course, you and your doctor may decide to screen more often for some diseases, based on your risk and co-morbidities (chronic disease you are already diagnosed with). Preventive services for you include: - Medicare offers their members a free annual wellness visit, which is time for you and your primary care provider to discuss and plan for your preventive service needs. Take advantage of this benefit every year! 
-All adults over age 72 should receive the recommended pneumonia vaccines. Current USPSTF guidelines recommend a series of two vaccines for the best pneumonia protection.  
-All adults should have a flu vaccine yearly and an ECG.  All adults age 61 and older should receive a shingles vaccine once in their lifetime.   
-All adults age 38-68 who are overweight should have a diabetes screening test once every three years.  
-Other screening tests & preventive services for persons with diabetes include: an eye exam to screen for diabetic retinopathy, a kidney function test, a foot exam, and stricter control over your cholesterol.  
-Cardiovascular screening for adults with routine risk involves an electrocardiogram (ECG) at intervals determined by the provider.  
-Colorectal cancer screening should be done for adults age 54-65 with no increased risk factors for colorectal cancer. There are a number of acceptable methods of screening for this type of cancer. Each test has its own benefits and drawbacks. Discuss with your provider what is most appropriate for you during your annual wellness visit. The different tests include: colonoscopy (considered the best screening method), a fecal occult blood test, a fecal DNA test, and sigmoidoscopy. 
-All adults born between [facility] should be screened once for Hepatitis C. 
-An Abdominal Aortic Aneurysm (AAA) Screening is recommended for men age 73-68 who has ever smoked in their lifetime. Here is a list of your current Health Maintenance items (your personalized list of preventive services) with a due date: 
Health Maintenance Due Topic Date Due  Shingles Vaccine (1 of 2) 08/13/2004 Blaise Garcia Annual Well Visit  05/04/2019

## 2019-05-29 NOTE — PROGRESS NOTES
This is the Subsequent Medicare Annual Wellness Exam, performed 12 months or more after the Initial AWV or the last Subsequent AWV    I have reviewed the patient's medical history in detail and updated the computerized patient record. History     Past Medical History:   Diagnosis Date    Constipation, chronic     Dyslipidemia 4/8/2014    Femur fracture ([address] 7/7/16    brace at right femur    GERD (gastroesophageal reflux disease)     Hypertension     MR (mental retardation), severe     Muscle weakness of lower extremity     with spasticity    Urinary incontinence due to cognitive impairment 8/23/2012      No past surgical history on file. Current Outpatient Medications   Medication Sig Dispense Refill    ferrous sulfate 220 mg (44 mg iron)/5 mL oral elixir TAKE 6.8ML (300MG) BY MOUTH TWICE DAILY 473 mL 3    enalapril (VASOTEC) 10 mg tablet TAKE 1 TABLET BY MOUTH TWICE DAILY FOR HTN 60 Tab 4    ondansetron (ZOFRAN ODT) 4 mg disintegrating tablet Take 1 Tab by mouth every eight (8) hours as needed for Nausea. 30 Tab 3    cyanocobalamin (VITAMIN B-12) 2,500 mcg sublingual tablet TAKE 1 TABLET BY MOUTH ONCE DAILY 90 Tab 2    benzonatate (TESSALON) 200 mg capsule Take 1 Cap by mouth three (3) times daily as needed for Cough.  90 Cap 1    sorbitol 70 % solution TAKE 30ML BY MOUTH TWICE DAILY FOR LAXATIVE 473 mL PRN    lactulose (CHRONULAC) 10 gram/15 mL solution TAKE 2 TABLESPOONFULS (20 GRAMS) BY MOUTH TWICE DAILY 1892 mL 2    multivit-min-folic-vit K-lycop (ONE-A-DAY MEN'S MULTIVITAMIN) 400- mcg tab TAKE 1 TABLET BY MOUTH ONCE DAILY 30 Tab 3    carvedilol (COREG) 3.125 mg tablet TAKE 1 TABLET BY MOUTH TWICE DAILY WITH MEALS (BKFST & SUPPER) FOR HTN 60 Tab 3    Swab (TOOTHETTE) swab Sig: Use as needed 600 Each 3    senna-docusate (DOK PLUS) 8.6-50 mg per tablet TAKE 1 TABLET BY MOUTH TWICE DAILY FOR CHRONIC CONSTIPATION 60 Tab 3    Omeprazole delayed release (PRILOSEC D/R) 20 mg tablet Take 1 Tab by mouth daily. 30 mins before breakfast 30 Tab 3    simvastatin (ZOCOR) 20 mg tablet TAKE 1 TABLET BY MOUTH NIGHTLY FOR DYSLIPIDEMIA 30 Tab 3    therapeutic multivitamin-minerals (THERAGRAN-M) tablet Take 1 Tab by mouth daily. 30 Tab 0    potassium chloride (KLOR-CON) 10 mEq tablet TAKE 1 TABLET BY MOUTH ONCE DAILY FOR LOW BLOOD LEVELS (MAY CRUSH ANDTAKE WITH APPLESAUCE) 30 Tab 3    sucralfate (CARAFATE) 100 mg/mL suspension TAKE 10ML (1GM) BY MOUTH FOUR TIMES DAILY FOR GERD 840 mL 0    potassium chloride (KLOR-CON) 10 mEq tablet TAKE 1 TABLET BY MOUTH ONCE DAILY FOR LOW BLOOD LEVELS (MAY CRUSH ANDTAKE WITH APPLESAUCE) 30 Tab 0    enalapril (VASOTEC) 10 mg tablet Take 1 Tab by mouth two (2) times a day.  30 Tab 0    raNITIdine (ZANTAC) 150 mg tablet TAKE 1 TABLET BY MOUTH TWICE DAILY 60 Tab 3    sucralfate (CARAFATE) 1 gram tablet TAKE 1 TABLET BY MOUTH FOUR TIMES DAILY AFTER MEALS AND AT BEDTIME FOR GERD 120 Tab 0     No Known Allergies  Family History   Family history unknown: Yes     Social History     Tobacco Use    Smoking status: Never Smoker    Smokeless tobacco: Never Used   Substance Use Topics    Alcohol use: No     Patient Active Problem List   Diagnosis Code    Hypertension I10    Constipation, chronic K59.09    MR (mental retardation), severe F72    Muscle weakness of lower extremity M62.81    GERD (gastroesophageal reflux disease) K21.9    Urinary incontinence due to cognitive impairment R39.81    Hypokalemia E87.6    Dyslipidemia E78.5    Cough R05    Encounter for long-term (current) use of medications Z79.899    Cold sore B00.1    Laceration of mouth, internal S01.512A    Diarrhea R19.7    Need for zoster vaccination Z23    Acute blood loss anemia D62    GI bleeding K92.2   [facility] discharge follow-up Z09    Erythematous rash R21    Non-intractable vomiting with nausea R11.2    Comminuted fracture of shaft of femur ([address] S72.353A    Fracture, femur ([facility]) S72. 90XA    Abrasion of foot, right G32.962E    Need for influenza vaccination Z23    Vitamin D deficiency E55.9    Abrasion of knee, right K08.387G    Medicare annual wellness visit, subsequent Z00.00    Prostate cancer screening Z12.5    Advance care planning Z71.89    Iron deficiency anemia D50.9    Anemia D64.9    Dark stools R19.5       Depression Risk Factor Screening:     3 most recent PHQ Screens 5/29/2019   Little interest or pleasure in doing things Not at all   Feeling down, depressed, irritable, or hopeless Not at all   Total Score PHQ 2 0     Alcohol Risk Factor Screening: You do not drink alcohol or very rarely. Functional Ability and Level of Safety:   Hearing Loss  Hearing is good. Activities of Daily Living  The home contains: grab bars  Patient needs help with:  preparing meals, eating, dressing, bathing, hygiene and bathroom needs    Fall Risk  Fall Risk Assessment, last 12 mths 5/29/2019   Able to walk? No       Abuse Screen  Patient is not abused    Cognitive Screening   Evaluation of Cognitive Function:  Has your family/caregiver stated any concerns about your memory: no  Unable to assess     Patient Care Team   Sabino Fenton NP-Family Practice  Dipti Daly MD- Neurology  Alyse Del Castillo MD- Gastroenterology  Assessment/Plan   Education and counseling provided:  Are appropriate based on today's review and evaluation    Diagnoses and all orders for this visit:    1. Medicare annual wellness visit, subsequent  -     [place] ANNUAL ALCOHOL SCREEN [address]    2.  Screening for alcoholism  -     ND ANNUAL ALCOHOL SCREEN 15 MIN    3. Screening for depression  -     [place] Maintenance Due   Topic Date Due    Shingrix Vaccine Age 50> (1 of 2) 08/13/2004    MEDICARE YEARLY EXAM  05/04/2019

## 2019-05-29 NOTE — PROGRESS NOTES
Chief Complaint   Patient presents with   [address] [place] [address] Annual Wellness Visit     1. Have you been to the ER, urgent care clinic since your last visit? Hospitalized since your last visit? No    2. Have you seen or consulted any other health care providers outside of the [address] [place] [address] since your last visit? Include any pap smears or colon screening.  No

## 2019-06-04 NOTE — PROGRESS NOTES
HPI  Augustina Clark is a 58 y.o. male, accompanied by two caregivers, who presents today for medication evaluation. Pt's caregivers would like medication refills and to inform provider that patient has had a couple incidents of vomiting and was evaluated in the ED in March for this after vomiting a black substance shortly after eating. Pt has not had any more incidents of vomiting of vomiting the black substance. Current Outpatient Medications   Medication Sig Dispense Refill    ferrous sulfate 220 mg (44 mg iron)/5 mL oral elixir TAKE 6.8ML (300MG) BY MOUTH TWICE DAILY 473 mL 3    enalapril (VASOTEC) 10 mg tablet TAKE 1 TABLET BY MOUTH TWICE DAILY FOR HTN 60 Tab 4    ondansetron (ZOFRAN ODT) 4 mg disintegrating tablet Take 1 Tab by mouth every eight (8) hours as needed for Nausea. 30 Tab 3    cyanocobalamin (VITAMIN B-12) 2,500 mcg sublingual tablet TAKE 1 TABLET BY MOUTH ONCE DAILY 90 Tab 2    benzonatate (TESSALON) 200 mg capsule Take 1 Cap by mouth three (3) times daily as needed for Cough. 90 Cap 1    sorbitol 70 % solution TAKE 30ML BY MOUTH TWICE DAILY FOR LAXATIVE 473 mL PRN    lactulose (CHRONULAC) 10 gram/15 mL solution TAKE 2 TABLESPOONFULS (20 GRAMS) BY MOUTH TWICE DAILY 1892 mL 2    multivit-min-folic-vit K-lycop (ONE-A-DAY MEN'S MULTIVITAMIN) 400- mcg tab TAKE 1 TABLET BY MOUTH ONCE DAILY 30 Tab 3    carvedilol (COREG) 3.125 mg tablet TAKE 1 TABLET BY MOUTH TWICE DAILY WITH MEALS (BKFST & SUPPER) FOR HTN 60 Tab 3    Swab (TOOTHETTE) swab Sig: Use as needed 600 Each 3    senna-docusate (DOK PLUS) 8.6-50 mg per tablet TAKE 1 TABLET BY MOUTH TWICE DAILY FOR CHRONIC CONSTIPATION 60 Tab 3    Omeprazole delayed release (PRILOSEC D/R) 20 mg tablet Take 1 Tab by mouth daily. 30 mins before breakfast 30 Tab 3    simvastatin (ZOCOR) 20 mg tablet TAKE 1 TABLET BY MOUTH NIGHTLY FOR DYSLIPIDEMIA 30 Tab 3    therapeutic multivitamin-minerals (THERAGRAN-M) tablet Take 1 Tab by mouth daily.  27 Tab 0    sucralfate (CARAFATE) 1 gram tablet TAKE 1 TABLET BY MOUTH FOUR TIMES DAILY AFTER MEALS AND AT BEDTIME FOR GERD 120 Tab 0      No Known Allergies    SUBJECTIVE:  Review of Systems   Constitutional: Negative for chills, fever and malaise/fatigue. Eyes: Negative for photophobia, discharge and redness. Respiratory: Negative for shortness of breath. Cardiovascular: Negative for leg swelling. Gastrointestinal: Negative for abdominal pain, diarrhea, nausea and vomiting. Genitourinary: Negative for dysuria, frequency and urgency. Musculoskeletal: Negative for falls. Neurological: Negative for seizures. OBJECTIVE:  Visit Vitals  /69 (BP 1 Location: Left arm, BP Patient Position: Sitting)   Pulse 63   Temp 97.9 °F (36.6 °C) (Oral)   Resp 18   Ht 4' 10\" (1.473 m)   SpO2 94%   BMI 19.65 kg/m²      Physical Exam   Constitutional: He is well-developed, well-nourished, and in no distress. HENT:   Head: Normocephalic. Eyes: Pupils are equal, round, and reactive to light. Conjunctivae and EOM are normal.   Neck: Normal range of motion. Neck supple. No thyromegaly present. Cardiovascular: Normal rate, regular rhythm and normal heart sounds. Pulmonary/Chest: Effort normal and breath sounds normal. He has no wheezes. He has no rales. He exhibits no tenderness. Abdominal: Soft. Bowel sounds are normal. He exhibits no distension. There is no tenderness. There is no guarding. Musculoskeletal: He exhibits no edema. Neurological: He is alert. Pt is wheelchair bond   Skin: Skin is warm and dry. No erythema. Nursing note and vitals reviewed. ASSESSMENT:  Diagnoses and all orders for this visit:    1. Non-intractable vomiting with nausea, unspecified vomiting type  -     ondansetron (ZOFRAN ODT) 4 mg disintegrating tablet; Take 1 Tab by mouth every eight (8) hours as needed for Nausea.     2. Constipation, chronic  -     sorbitol 70 % solution; TAKE 30ML BY MOUTH TWICE DAILY FOR LAXATIVE  -     senna-docusate (DOK PLUS) 8.6-50 mg per tablet; TAKE 1 TABLET BY MOUTH TWICE DAILY FOR CHRONIC CONSTIPATION    Other orders  -     ferrous sulfate 220 mg (44 mg iron)/5 mL oral elixir; TAKE 6.8ML (300MG) BY MOUTH TWICE DAILY  -     enalapril (VASOTEC) 10 mg tablet; TAKE 1 TABLET BY MOUTH TWICE DAILY FOR HTN  -     cyanocobalamin (VITAMIN B-12) 2,500 mcg sublingual tablet; TAKE 1 TABLET BY MOUTH ONCE DAILY  -     benzonatate (TESSALON) 200 mg capsule; Take 1 Cap by mouth three (3) times daily as needed for Cough. -     lactulose (CHRONULAC) 10 gram/15 mL solution; TAKE 2 TABLESPOONFULS (20 GRAMS) BY MOUTH TWICE DAILY  -     multivit-min-folic-vit K-lycop (ONE-A-DAY MEN'S MULTIVITAMIN) 400- mcg tab; TAKE 1 TABLET BY MOUTH ONCE DAILY  -     carvedilol (COREG) 3.125 mg tablet; TAKE 1 TABLET BY MOUTH TWICE DAILY WITH MEALS (BKFST & SUPPER) FOR HTN  -     Swab (TOOTHETTE) swab; Sig: Use as needed  -     Omeprazole delayed release (PRILOSEC D/R) 20 mg tablet; Take 1 Tab by mouth daily. 30 mins before breakfast    PLAN:  No changes to medication  Follow up with GI for further eval of GERD  Ordered provided to crush meds and administer with food     I have discussed the diagnosis with the patient and the intended plan as seen in the above orders. The patient has received an after-visit summary and questions were answered concerning future plans. I have discussed medication side effects and warnings with the patient as well. Patient will call for further questions. Follow-up and Dispositions    · Return in about 1 month (around 5/29/2019) for medicare wellness.        Fariha Granda NP

## 2019-06-07 DIAGNOSIS — E78.5 DYSLIPIDEMIA: ICD-10-CM

## 2019-06-07 NOTE — TELEPHONE ENCOUNTER
Requested Prescriptions     Pending Prescriptions Disp Refills    simvastatin (ZOCOR) 20 mg tablet 30 Tab 3     Sig: TAKE 1 TABLET BY MOUTH NIGHTLY FOR DYSLIPIDEMIA

## 2019-06-10 RX ORDER — SIMVASTATIN 20 MG/1
TABLET, FILM COATED ORAL
Qty: 30 TAB | Refills: 3 | Status: SHIPPED | OUTPATIENT
Start: 2019-06-10 | End: 2019-10-15 | Stop reason: SDUPTHER

## 2019-08-07 ENCOUNTER — OFFICE VISIT (OUTPATIENT)
Dept: FAMILY MEDICINE CLINIC | Age: 64
End: 2019-08-07

## 2019-08-07 VITALS
HEIGHT: 60 IN | RESPIRATION RATE: 16 BRPM | SYSTOLIC BLOOD PRESSURE: 104 MMHG | DIASTOLIC BLOOD PRESSURE: 70 MMHG | BODY MASS INDEX: 19.65 KG/M2 | TEMPERATURE: 97.7 F | OXYGEN SATURATION: 95 % | HEART RATE: 69 BPM

## 2019-08-07 DIAGNOSIS — R21 RASH: Primary | ICD-10-CM

## 2019-08-07 RX ORDER — HYDROCORTISONE 25 MG/G
CREAM TOPICAL
Qty: 60 G | Refills: 3 | Status: SHIPPED | OUTPATIENT
Start: 2019-08-07 | End: 2021-02-06

## 2019-08-07 NOTE — PROGRESS NOTES
Chief Complaint   Patient presents with    Rash       Pt preferred language for health care discussion is english. Is someone accompanying this pt? Yes caretaker    Is the patient using any DME equipment during OV? wheelchair    Depression Screening:  3 most recent [facility] 36 Screens 5/29/2019 1/23/2019 3/9/2017 6/13/2016 1/28/2016 4/1/2015   Little interest or pleasure in doing things Not at all Not at all Not at all Not at all Not at all Not at all   Feeling down, depressed, irritable, or hopeless Not at all Not at all Not at all Not at all Not at all Not at all   Total Score PHQ 2 0 0 0 0 0 0       Learning Assessment:  Learning Assessment 1/23/2019 4/7/2014   PRIMARY LEARNER Patient Patient   BARRIERS PRIMARY LEARNER - [address] CAREGIVER - Yes   [address] (COMMENT) ENGLISH   LEARNER PREFERENCE PRIMARY OTHER (COMMENT) OTHER (COMMENT)   ANSWERED BY other -   RELATIONSHIP SELF -         Health Maintenance reviewed and discussed per provider. Yes        Advance Directive:  1. Do you have an advance directive in place? Patient Reply:yes      Coordination of Care:  1. Have you been to the ER, urgent care clinic since your last visit? Hospitalized since your last visit? no    2. Have you seen or consulted any other health care providers outside of the [address] [place] [address] since your last visit? Include any pap smears or colon screening.  no    Provider prefers to do his own med reconciliation

## 2019-08-07 NOTE — PROGRESS NOTES
HPI:   Nonverbal pt presents with caretaker reporting rash arms/legs x 3 weeks and wonders if d/t insect bites  No fever, drainage; given keflex last mo at ER w/o response      ROS is otherwise negative. Past Medical History:   Diagnosis Date    Constipation, chronic     Dyslipidemia 4/8/2014    Femur fracture ([address] 7/7/16    brace at right femur    GERD (gastroesophageal reflux disease)     Hypertension     MR (mental retardation), severe     Muscle weakness of lower extremity     with spasticity    Urinary incontinence due to cognitive impairment 8/23/2012       No past surgical history on file.     Social History     Socioeconomic History    Marital status: SINGLE     Spouse name: Not on file    Number of children: Not on file    Years of education: Not on file    Highest education level: Not on file   Occupational History    Not on file   Social Needs    Financial resource strain: Not on file    Food insecurity:     Worry: Not on file     Inability: Not on file    Transportation needs:     Medical: Not on file     Non-medical: Not on file   Tobacco Use    Smoking status: Never Smoker    Smokeless tobacco: Never Used   Substance and Sexual Activity    Alcohol use: No    Drug use: No    Sexual activity: Never   Lifestyle    Physical activity:     Days per week: Not on file     Minutes per session: Not on file    Stress: Not on file   Relationships    Social connections:     Talks on phone: Not on file     Gets together: Not on file     Attends [employer] service: Not on file     Active member of club or organization: Not on file     Attends meetings of clubs or organizations: Not on file     Relationship status: Not on file    Intimate partner violence:     Fear of current or ex partner: Not on file     Emotionally abused: Not on file     Physically abused: Not on file     Forced sexual activity: Not on file   Other Topics Concern    Not on file   Social History Narrative    Not on file No Known Allergies    Family History   Family history unknown: Yes       Current Outpatient Medications   Medication Sig Dispense Refill    simvastatin (ZOCOR) 20 mg tablet TAKE 1 TABLET BY MOUTH NIGHTLY FOR DYSLIPIDEMIA 30 Tab 3    ferrous sulfate 220 mg (44 mg iron)/5 mL oral elixir TAKE 6.8ML (300MG) BY MOUTH TWICE DAILY 473 mL 3    enalapril (VASOTEC) 10 mg tablet TAKE 1 TABLET BY MOUTH TWICE DAILY FOR HTN 60 Tab 4    ondansetron (ZOFRAN ODT) 4 mg disintegrating tablet Take 1 Tab by mouth every eight (8) hours as needed for Nausea. 30 Tab 3    cyanocobalamin (VITAMIN B-12) 2,500 mcg sublingual tablet TAKE 1 TABLET BY MOUTH ONCE DAILY 90 Tab 2    benzonatate (TESSALON) 200 mg capsule Take 1 Cap by mouth three (3) times daily as needed for Cough. 90 Cap 1    sorbitol 70 % solution TAKE 30ML BY MOUTH TWICE DAILY FOR LAXATIVE 473 mL PRN    lactulose (CHRONULAC) 10 gram/15 mL solution TAKE 2 TABLESPOONFULS (20 GRAMS) BY MOUTH TWICE DAILY 1892 mL 2    multivit-min-folic-vit K-lycop (ONE-A-DAY MEN'S MULTIVITAMIN) 400- mcg tab TAKE 1 TABLET BY MOUTH ONCE DAILY 30 Tab 3    carvedilol (COREG) 3.125 mg tablet TAKE 1 TABLET BY MOUTH TWICE DAILY WITH MEALS (BKFST & SUPPER) FOR HTN 60 Tab 3    Swab (TOOTHETTE) swab Sig: Use as needed 600 Each 3    senna-docusate (DOK PLUS) 8.6-50 mg per tablet TAKE 1 TABLET BY MOUTH TWICE DAILY FOR CHRONIC CONSTIPATION 60 Tab 3    Omeprazole delayed release (PRILOSEC D/R) 20 mg tablet Take 1 Tab by mouth daily. 30 mins before breakfast 30 Tab 3    therapeutic multivitamin-minerals (THERAGRAN-M) tablet Take 1 Tab by mouth daily.  30 Tab 0    sucralfate (CARAFATE) 1 gram tablet TAKE 1 TABLET BY MOUTH FOUR TIMES DAILY AFTER MEALS AND AT BEDTIME FOR GERD 120 Tab 0           Visit Vitals  /70 (BP 1 Location: Left arm, BP Patient Position: Sitting)   Pulse 69   Temp 97.7 °F (36.5 °C) (Tympanic)   Resp 16   Ht 4' 10\" (1.473 m)   SpO2 95%   BMI 19.65 kg/m² PE  Wheelchair bound in NAD  Neck: supple   Chest: clear. CV: RRR w/o m,r,g  Abd: soft, NT  Ext: w/o edema. Skin: small papules arms/legs w/o drainage/fluctuance or confluent redness; +scattered excoriations    Assessment and Plan    Encounter Diagnoses   Name Primary?     Rash Yes       Rash (allergic vs contact; doubt d/t infection): HC 2.5% tid prn; f/u worsening or unimproved 7 days  OV 3 mos or prn  I have explained plan to caretaker and she verbalizes understanding

## 2019-09-05 DIAGNOSIS — K59.09 CONSTIPATION, CHRONIC: ICD-10-CM

## 2019-09-05 RX ORDER — PHENOL/SODIUM PHENOLATE
20 AEROSOL, SPRAY (ML) MUCOUS MEMBRANE DAILY
Qty: 30 TAB | Refills: 3 | Status: SHIPPED | OUTPATIENT
Start: 2019-09-05 | End: 2019-12-30 | Stop reason: SDUPTHER

## 2019-09-05 RX ORDER — CARVEDILOL 3.12 MG/1
TABLET ORAL
Qty: 60 TAB | Refills: 3 | Status: SHIPPED | OUTPATIENT
Start: 2019-09-05 | End: 2019-12-30 | Stop reason: SDUPTHER

## 2019-09-05 RX ORDER — AMOXICILLIN 250 MG
CAPSULE ORAL
Qty: 60 TAB | Refills: 3 | Status: SHIPPED | OUTPATIENT
Start: 2019-09-05 | End: 2020-02-06 | Stop reason: SDUPTHER

## 2019-10-15 DIAGNOSIS — E78.5 DYSLIPIDEMIA: ICD-10-CM

## 2019-10-16 ENCOUNTER — OFFICE VISIT (OUTPATIENT)
Dept: FAMILY MEDICINE CLINIC | Age: 64
End: 2019-10-16

## 2019-10-16 VITALS
HEART RATE: 95 BPM | HEIGHT: 60 IN | RESPIRATION RATE: 18 BRPM | TEMPERATURE: 98.5 F | BODY MASS INDEX: 19.65 KG/M2 | SYSTOLIC BLOOD PRESSURE: 128 MMHG | DIASTOLIC BLOOD PRESSURE: 78 MMHG | OXYGEN SATURATION: 98 %

## 2019-10-16 DIAGNOSIS — R05.9 COUGH: Primary | ICD-10-CM

## 2019-10-16 RX ORDER — AMOXICILLIN 500 MG/1
CAPSULE ORAL
Qty: 30 CAP | Refills: 0 | Status: SHIPPED | OUTPATIENT
Start: 2019-10-16 | End: 2020-04-14

## 2019-10-16 NOTE — PROGRESS NOTES
HPI:   Seen in ER 10/5/19 for loose stools w/o N/V, abd pain or GI hemorrhage  Improving  Now with sinus drainage, NP cough x 7 days      ROS is otherwise negative. Past Medical History:   Diagnosis Date    Constipation, chronic     Dyslipidemia 4/8/2014    Femur fracture ([address] 7/7/16    brace at right femur    GERD (gastroesophageal reflux disease)     Hypertension     MR (mental retardation), severe     Muscle weakness of lower extremity     with spasticity    Urinary incontinence due to cognitive impairment 8/23/2012       No past surgical history on file.     Social History     Socioeconomic History    Marital status: SINGLE     Spouse name: Not on file    Number of children: Not on file    Years of education: Not on file    Highest education level: Not on file   Occupational History    Not on file   Social Needs    Financial resource strain: Not on file    Food insecurity:     Worry: Not on file     Inability: Not on file    Transportation needs:     Medical: Not on file     Non-medical: Not on file   Tobacco Use    Smoking status: Never Smoker    Smokeless tobacco: Never Used   Substance and Sexual Activity    Alcohol use: No    Drug use: No    Sexual activity: Never   Lifestyle    Physical activity:     Days per week: Not on file     Minutes per session: Not on file    Stress: Not on file   Relationships    Social connections:     Talks on phone: Not on file     Gets together: Not on file     Attends [employer] service: Not on file     Active member of club or organization: Not on file     Attends meetings of clubs or organizations: Not on file     Relationship status: Not on file    Intimate partner violence:     Fear of current or ex partner: Not on file     Emotionally abused: Not on file     Physically abused: Not on file     Forced sexual activity: Not on file   Other Topics Concern    Not on file   Social History Narrative    Not on file       No Known Allergies    Family History   Family history unknown: Yes       Current Outpatient Medications   Medication Sig Dispense Refill    multivit-min-folic-vit K-lycop (ONE-A-DAY MEN'S MULTIVITAMIN) 400- mcg tab TAKE 1 TABLET BY MOUTH ONCE DAILY 30 Tab 3    carvedilol (COREG) 3.125 mg tablet TAKE 1 TABLET BY MOUTH TWICE DAILY WITH MEALS (BKFST & SUPPER) FOR HTN 60 Tab 3    Omeprazole delayed release (PRILOSEC D/R) 20 mg tablet Take 1 Tab by mouth daily. 30 mins before breakfast 30 Tab 3    senna-docusate (DOK PLUS) 8.6-50 mg per tablet TAKE 1 TABLET BY MOUTH TWICE DAILY FOR CHRONIC CONSTIPATION 60 Tab 3    hydrocortisone (HYTONE) 2.5 % topical cream Apply  to affected area three (3) times daily as needed (rash). use thin layer 60 g 3    simvastatin (ZOCOR) 20 mg tablet TAKE 1 TABLET BY MOUTH NIGHTLY FOR DYSLIPIDEMIA 30 Tab 3    ferrous sulfate 220 mg (44 mg iron)/5 mL oral elixir TAKE 6.8ML (300MG) BY MOUTH TWICE DAILY 473 mL 3    enalapril (VASOTEC) 10 mg tablet TAKE 1 TABLET BY MOUTH TWICE DAILY FOR HTN 60 Tab 4    ondansetron (ZOFRAN ODT) 4 mg disintegrating tablet Take 1 Tab by mouth every eight (8) hours as needed for Nausea. 30 Tab 3    cyanocobalamin (VITAMIN B-12) 2,500 mcg sublingual tablet TAKE 1 TABLET BY MOUTH ONCE DAILY 90 Tab 2    benzonatate (TESSALON) 200 mg capsule Take 1 Cap by mouth three (3) times daily as needed for Cough. 90 Cap 1    sorbitol 70 % solution TAKE 30ML BY MOUTH TWICE DAILY FOR LAXATIVE 473 mL PRN    lactulose (CHRONULAC) 10 gram/15 mL solution TAKE 2 TABLESPOONFULS (20 GRAMS) BY MOUTH TWICE DAILY 1892 mL 2    Swab (TOOTHETTE) swab Sig: Use as needed 600 Each 3    therapeutic multivitamin-minerals (THERAGRAN-M) tablet Take 1 Tab by mouth daily.  30 Tab 0    sucralfate (CARAFATE) 1 gram tablet TAKE 1 TABLET BY MOUTH FOUR TIMES DAILY AFTER MEALS AND AT BEDTIME FOR GERD 120 Tab 0           Visit Vitals  /78 (BP 1 Location: Right arm, BP Patient Position: Sitting)   Pulse 78   Temp 98.5 °F (36.9 °C) (Tympanic)   Resp 18   Ht 4' 10\" (1.473 m)   SpO2 97%   BMI 19.65 kg/m²       PE  Thin, wheelchair bound in NAD  HEENT: OP: clear. Neck: supple  Chest: clear. CV: RRR w/o m,r,g  Abd: +BS, soft, NT, w/o HSM or mass. Ext: w/o edema (atrophic)      Assessment and Plan    Encounter Diagnoses   Name Primary?     Cough Yes     Sinusitis/cough: amoxicillin 500 mg tid for 10 days  Diarrhea  Improving  Hold meds for constipation x 5-7 day  [place] diet/increase fluids  OV 3 mos or prn  I have explained plan to caretaker and she verbalizes understanding

## 2019-10-16 NOTE — PATIENT INSTRUCTIONS
Cough: Care Instructions  Your Care Instructions    A cough is your body's response to something that bothers your throat or airways. Many things can cause a cough. You might cough because of a cold or the flu, bronchitis, or asthma. Smoking, postnasal drip, allergies, and stomach acid that backs up into your throat also can cause coughs. A cough is a symptom, not a disease. Most coughs stop when the cause, such as a cold, goes away. You can take a few steps at home to cough less and feel better. Follow-up care is a key part of your treatment and safety. Be sure to make and go to all appointments, and call your doctor if you are having problems. It's also a good idea to know your test results and keep a list of the medicines you take. How can you care for yourself at home? · Drink lots of water and other fluids. This helps thin the mucus and soothes a dry or sore throat. Honey or lemon juice in hot water or tea may ease a dry cough. · Take cough medicine as directed by your doctor. · Prop up your head on pillows to help you breathe and ease a dry cough. · Try cough drops to soothe a dry or sore throat. Cough drops don't stop a cough. Medicine-flavored cough drops are no better than candy-flavored drops or hard candy. · Do not smoke. Avoid secondhand smoke. If you need help quitting, talk to your doctor about stop-smoking programs and medicines. These can increase your chances of quitting for good. When should you call for help? Call 911 anytime you think you may need emergency care.  For example, call if:    · You have severe trouble breathing.    Call your doctor now or seek immediate medical care if:    · You cough up blood.     · You have new or worse trouble breathing.     · You have a new or higher fever.     · You have a new rash.    Watch closely for changes in your health, and be sure to contact your doctor if:    · You cough more deeply or more often, especially if you notice more mucus or a change in the color of your mucus.     · You have new symptoms, such as a sore throat, an earache, or sinus pain.     · You do not get better as expected. Where can you learn more? Go to http://haven-magdi.info/. Enter D279 in the search box to learn more about \"Cough: Care Instructions. \"  Current as of: June 9, 2019  Content Version: 12.2  © 5027-4060 [employer]. Care instructions adapted under license by [employer] (which disclaims liability or warranty for this information). If you have questions about a medical condition or this instruction, always ask your healthcare professional. [address] any warranty or liability for your use of this information.

## 2019-10-21 RX ORDER — LACTULOSE 10 G/15ML
SOLUTION ORAL; RECTAL
Qty: 1892 ML | Refills: 2 | Status: SHIPPED | OUTPATIENT
Start: 2019-10-21

## 2019-10-21 RX ORDER — ENALAPRIL MALEATE 10 MG/1
TABLET ORAL
Qty: 60 TAB | Refills: 4 | Status: SHIPPED | OUTPATIENT
Start: 2019-10-21

## 2019-10-21 RX ORDER — SIMVASTATIN 20 MG/1
TABLET, FILM COATED ORAL
Qty: 30 TAB | Refills: 3 | Status: SHIPPED | OUTPATIENT
Start: 2019-10-21 | End: 2020-02-19

## 2019-10-21 NOTE — TELEPHONE ENCOUNTER
Requested Prescriptions     Pending Prescriptions Disp Refills    simvastatin (ZOCOR) 20 mg tablet 30 Tab 3     Sig: TAKE 1 TABLET BY MOUTH NIGHTLY FOR DYSLIPIDEMIA    lactulose (CHRONULAC) 10 gram/15 mL solution 1892 mL 2     Sig: TAKE 2 TABLESPOONFULS (20 GRAMS) BY MOUTH TWICE DAILY    enalapril (VASOTEC) 10 mg tablet 60 Tab 4     Sig: TAKE 1 TABLET BY MOUTH TWICE DAILY FOR HTN     Patient is out of medications.

## 2019-11-26 NOTE — PROGRESS NOTES
Chief Complaint   Patient presents with    Anemia       HPI:  Patient is a 57year old  male with medical problems listed below presents today for follow up post hospital discharge. He was admitted at [facility] on 3/9/17 and discharged on 3/13/17. He was treated for acute blood loss anemia as Hb on admission was low at 6.1 and he was transfused, though he was hemoccult negative. Gastroenterology was consulted and recommended against endoscopy as they felt he was a poor candidate for endoscopy due to physical contractures and atrophy. Palliative care was also consulted and Dr. Joenathan Meckel discussed with his guardian and medical POA at [facility] and recommended DNR. Anemia work up during recent admission was consistent with iron deficiency anemia and he was discharged on liquid ferrous sulfate twice daily. He was accompanied to today's visit by his  Ms. Castillo Dominguez who stated that he has been feeling well post discharge with no significant complaints reported today. In addition, she also stated that he has been taking his medications with no adverse effects noted. Past Medical History:   Diagnosis Date    Constipation, chronic     Dyslipidemia 4/8/2014    Femur fracture ([address] 7/7/16    brace at right femur    GERD (gastroesophageal reflux disease)     Hypertension     MR (mental retardation), severe     Muscle weakness of lower extremity     with spasticity    Urinary incontinence due to cognitive impairment 8/23/2012       No Known Allergies      Current Outpatient Prescriptions   Medication Sig Dispense Refill    carvedilol (COREG) 3.125 mg tablet Take 1 Tab by mouth two (2) times daily (with meals). 60 Tab 0    cyanocobalamin (VITAMIN B12) 2,500 mcg sublingual tablet Take 1 Tab by mouth daily. 30 Tab 0    enalapril (VASOTEC) 10 mg tablet Take 1 Tab by mouth two (2) times a day.  30 Tab 0    ergocalciferol (ERGOCALCIFEROL) 50,000 unit capsule Take 1 Cap by mouth every seven (7) days. 30 Cap 0    therapeutic multivitamin-minerals (THERAGRAN-M) tablet Take 1 Tab by mouth daily. 30 Tab 0    carvedilol (COREG) 3.125 mg tablet Take 1 Tab by mouth two (2) times daily (with meals). 180 Tab 1    cyanocobalamin (VITAMIN B12) 2,500 mcg sublingual tablet Take 1 Tab by mouth daily. 30 Tab 6    enalapril (VASOTEC) 10 mg tablet Take 1 Tab by mouth two (2) times a day. 180 Tab 3    ergocalciferol (ERGOCALCIFEROL) 50,000 unit capsule Take 1 Cap by mouth every seven (7) days. 4 Cap 3    ferrous sulfate 300 mg (60 mg iron)/5 mL syrup Take 5 mL by mouth two (2) times a day for 30 days. 240 mL 0    hydroCHLOROthiazide (HYDRODIURIL) 25 mg tablet Take 1 Tab by mouth daily. 30 Tab 5    multivit with min-FA-lycopene (ONE-A-DAY MEN'S MULTIVITAMIN) 400-300 mcg tab Take 1 Each by mouth daily. 90 Each 3    potassium chloride (K-DUR, KLOR-CON) 10 mEq tablet Take 2 Tabs by mouth daily. 30 Tab 0    raNITIdine (ZANTAC) 150 mg tablet Take 1 Tab by mouth two (2) times a day. 180 Tab 2    senna-docusate (PERICOLACE) 8.6-50 mg per tablet Take 1 Tab by mouth two (2) times a day. 180 Tab 1    simvastatin (ZOCOR) 20 mg tablet Take 1 Tab by mouth nightly.  90 Tab 3    sorbitol 70 % solution TAKE 30ML BY MOUTH TWICE DAILY FOR LAXATIVE 473 mL PRN    sucralfate (CARAFATE) 1 gram tablet TAKE 1 TABLET BY MOUTH FOUR TIMES DAILY (AFTER MEALS & AT BEDTIME) FOR GERD 120 Tab 3    Swab (TOOTHETTE) swab Sig: Use as needed 600 Each 3    docosanol (ABREVA) 10 % topical cream Thin film 2 g 0          ROS:  Unable to review as pt mentally challenged        Physical Exam:  Visit Vitals    /60 (BP 1 Location: Left arm, BP Patient Position: Sitting)    Pulse 67    Temp 97.7 °F (36.5 °C) (Axillary)    Resp 16    Ht 4' 10\" (1.473 m)    SpO2 98%     General: Black male in wheelchair, awake, interactive and smiling, afebrile, in no acute distress  Respiratory: symmetrical chest expansion, lung sounds clear bilaterally, good air entry, good respiratory effort, no wheezes or crackles  Cardiovascular: normal S1S2, regular rate and rhythm, no murmurs, pulses palpable, no thrill, no carotid or abdominal bruits, no JVD  Abdomen: soft, non-distended, normoactive bowel sounds x 4 quadrants. Extremity: No cyanosis or edema noted, though all four extremity contracted      Assessment/Plan:    ICD-10-CM ICD-9-CM    1. Hospital discharge follow-up Z09 [facility] discharge follow up was completed at the office today. 2. Acute blood loss anemia D62 285.1 Improved with transfusion at recent admission with last checked H/H done on 3/12/17 stable at 7.9/27.2. Will continue to monitor and repeat CBC at f/u in 1 month. CBC WITH AUTOMATED DIFF   3. Essential hypertension I10 401.9 Well controlled  Continue current meds   4. MR (mental retardation), severe F72 318.1    5. Vitamin D deficiency E55.9 268.9 Continue Vit D 50,000 units Q week. 6. Iron deficiency anemia, unspecified iron deficiency anemia type D50.9 280.9 Continue liquid ferrous sulfate twice daily and will check Ferritin at f/u in 1 month. Orders Placed This Encounter    CBC WITH AUTOMATED DIFF     Standing Status:   Future     Standing Expiration Date:   9/19/2017    FERRITIN     Standing Status:   Future     Standing Expiration Date:   3/24/2018           Review of records of recent hospitalization was done by me. Group home paperwork filled out today. Additional Notes: Discussed today's diagnosis, treatment plans. Discussed medication indications and side effects. After Visit Summary: Discussed provided printed patient instructions. Answered questions accordingly. Follow-up Disposition:  In 4 weeks with labs 1 week prior        Leif Downey DO, MPH  Internal Medicine : Yes

## 2019-12-30 RX ORDER — CARVEDILOL 3.12 MG/1
TABLET ORAL
Qty: 60 TAB | Refills: 0 | Status: SHIPPED | OUTPATIENT
Start: 2019-12-30 | End: 2020-01-24

## 2019-12-30 RX ORDER — PHENOL/SODIUM PHENOLATE
20 AEROSOL, SPRAY (ML) MUCOUS MEMBRANE DAILY
Qty: 30 TAB | Refills: 0 | Status: ON HOLD | OUTPATIENT
Start: 2019-12-30 | End: 2021-02-06

## 2020-01-24 RX ORDER — CARVEDILOL 3.12 MG/1
TABLET ORAL
Qty: 60 TAB | Refills: 3 | Status: SHIPPED | OUTPATIENT
Start: 2020-01-24 | End: 2021-02-06

## 2020-01-24 RX ORDER — OMEPRAZOLE 20 MG/1
CAPSULE, DELAYED RELEASE ORAL
Qty: 30 CAP | Refills: 4 | Status: SHIPPED | OUTPATIENT
Start: 2020-01-24 | End: 2021-02-06

## 2020-02-06 DIAGNOSIS — K59.09 CONSTIPATION, CHRONIC: ICD-10-CM

## 2020-02-06 NOTE — TELEPHONE ENCOUNTER
Requested Prescriptions     Pending Prescriptions Disp Refills    senna-docusate (DOK PLUS) 8.6-50 mg per tablet 60 Tab 3     Sig: TAKE 1 TABLET BY MOUTH TWICE DAILY FOR CHRONIC CONSTIPATION    multivit-min-folic-vit K-lycop (ONE-A-DAY MEN'S MULTIVITAMIN) 400- mcg tab 30 Tab 3     Sig: TAKE 1 TABLET BY MOUTH ONCE DAILY

## 2020-02-07 NOTE — TELEPHONE ENCOUNTER
Last Visit: 5/29/19 with CLAUDETTE Simpson  Next Appointment: 6/3/20 with CLAUDETTE Simpson  Previous Refill Encounter(s): 9/5/19 Dok Plus #60 with 3 refills, 9/18/19 Multivitamin #30 with 3 refills    Requested Prescriptions     Pending Prescriptions Disp Refills    senna-docusate (DOK PLUS) 8.6-50 mg per tablet 60 Tab 3     Sig: Take 1 Tab by mouth two (2) times a day. FOR CHRONIC CONSTIPATION    multivit-min-folic-vit K-lycop (ONE-A-DAY MEN'S MULTIVITAMIN) 400- mcg tab 30 Tab 3     Sig: Take 1 Tab by mouth daily.

## 2020-02-10 RX ORDER — AMOXICILLIN 250 MG
1 CAPSULE ORAL 2 TIMES DAILY
Qty: 60 TAB | Refills: 3 | Status: SHIPPED | OUTPATIENT
Start: 2020-02-10

## 2020-04-02 ENCOUNTER — VIRTUAL VISIT (OUTPATIENT)
Dept: FAMILY MEDICINE CLINIC | Age: 65
End: 2020-04-02

## 2020-04-02 DIAGNOSIS — J40 BRONCHITIS: Primary | ICD-10-CM

## 2020-04-02 DIAGNOSIS — R06.2 WHEEZING: ICD-10-CM

## 2020-04-02 DIAGNOSIS — R05.9 COUGH: ICD-10-CM

## 2020-04-02 RX ORDER — AZITHROMYCIN 250 MG/1
TABLET, FILM COATED ORAL
Qty: 6 TAB | Refills: 0 | Status: SHIPPED | OUTPATIENT
Start: 2020-04-02 | End: 2020-04-07

## 2020-04-02 RX ORDER — PREDNISONE 10 MG/1
TABLET ORAL
Qty: 21 TAB | Refills: 0 | Status: SHIPPED | OUTPATIENT
Start: 2020-04-02 | End: 2021-02-06

## 2020-04-02 RX ORDER — BENZONATATE 200 MG/1
200 CAPSULE ORAL
Qty: 21 CAP | Refills: 0 | Status: SHIPPED | OUTPATIENT
Start: 2020-04-02 | End: 2020-04-09

## 2020-04-02 NOTE — PROGRESS NOTES
Elvin Baires 71 y.o. male who was seen by synchronous (real-time) audio-video technology on 04/02/20    Consent:  heand/or his healthcare decision maker is aware that this patient-initiated Telehealth encounter is a billable service, with coverage as determined by her insurance carrier. he is aware that he may receive a bill and has provided verbal consent to proceed: Yes I was in my office while conducting this encounter. Elvin Baires is a 71 y.o.  male and presents with Cough and Wheezing      SUBJECTIVE:    Acute Bronchitis  Patient presents for evaluation of nonproductive cough and wheezing. Symptoms began 1 week ago and are unchanged since that time. Past history is significant for no history of pneumonia or bronchitis. Pt resides in a group home and does not speak but caregiver communicated info to me. He also was seen by another doctor yesterday who said he was wheezing. Respiratory ROS: negative for - shortness of breath  Cardiovascular ROS: negative for - chest pain    Current Outpatient Medications   Medication Sig    predniSONE (STERAPRED DS) 10 mg dose pack See administration instruction per 10mg dose pack    azithromycin (ZITHROMAX) 250 mg tablet Take 2 tablets today, then take 1 tablet daily    benzonatate (TESSALON) 200 mg capsule Take 1 Cap by mouth three (3) times daily as needed for Cough for up to 7 days.  simvastatin (ZOCOR) 20 mg tablet TAKE 1 TABLET BY MOUTH NIGHTLY FOR DYSLIPIDEMIA    senna-docusate (DOK PLUS) 8.6-50 mg per tablet Take 1 Tab by mouth two (2) times a day. FOR CHRONIC CONSTIPATION    multivit-min-folic-vit K-lycop (ONE-A-DAY MEN'S MULTIVITAMIN) 400- mcg tab Take 1 Tab by mouth daily.  ferrous sulfate 220 mg (44 mg iron)/5 mL oral elixir Take 6.8 mL by mouth two (2) times a day.     carvediloL (COREG) 3.125 mg tablet TAKE 1 TABLET BY MOUTH TWICE DAILY WITH MEALS (BKFST & SUPPER) FOR HTN    omeprazole (PRILOSEC) 20 mg capsule TAKE ONE CAPSULE BY MOUTH ONCE DAILY 30MINS BEFORE A MEAL    Omeprazole delayed release (PRILOSEC D/R) 20 mg tablet Take 1 Tab by mouth daily. 30 mins before breakfast    lactulose (CHRONULAC) 10 gram/15 mL solution TAKE 2 TABLESPOONFULS (20 GRAMS) BY MOUTH TWICE DAILY    enalapril (VASOTEC) 10 mg tablet TAKE 1 TABLET BY MOUTH TWICE DAILY FOR HTN    amoxicillin (AMOXIL) 500 mg capsule 1 tid for 10 days    hydrocortisone (HYTONE) 2.5 % topical cream Apply  to affected area three (3) times daily as needed (rash). use thin layer    ondansetron (ZOFRAN ODT) 4 mg disintegrating tablet Take 1 Tab by mouth every eight (8) hours as needed for Nausea.  cyanocobalamin (VITAMIN B-12) 2,500 mcg sublingual tablet TAKE 1 TABLET BY MOUTH ONCE DAILY    sorbitol 70 % solution TAKE 30ML BY MOUTH TWICE DAILY FOR LAXATIVE    Swab (TOOTHETTE) swab Sig: Use as needed    therapeutic multivitamin-minerals (THERAGRAN-M) tablet Take 1 Tab by mouth daily.  sucralfate (CARAFATE) 1 gram tablet TAKE 1 TABLET BY MOUTH FOUR TIMES DAILY AFTER MEALS AND AT BEDTIME FOR GERD     No current facility-administered medications for this visit. OBJECTIVE:  Pt in no distress sitting in chair  There were no vitals taken for this visit. well developed and well nourished          Assessment/Plan      ICD-10-CM ICD-9-CM    1. Bronchitis J40 490 predniSONE (STERAPRED DS) 10 mg dose pack      azithromycin (ZITHROMAX) 250 mg tablet   2. Cough R05 786.2 benzonatate (TESSALON) 200 mg capsule   3. Wheezing R06.2 786.07 predniSONE (STERAPRED DS) 10 mg dose pack     Follow-up and Dispositions    · Return if symptoms worsen or fail to improve. Reviewed plan of care. Patient has provided input and agrees with goals.

## 2020-04-09 ENCOUNTER — TELEPHONE (OUTPATIENT)
Dept: FAMILY MEDICINE CLINIC | Age: 65
End: 2020-04-09

## 2020-04-09 NOTE — TELEPHONE ENCOUNTER
Attempted call to pt per provider request bc had a doxy visit on her schedule today, however, provider was unable to reach patient. Unable to reach pt at numbers listed on acct,  Clarification needed on what visit was to be for and if pt  Still needs assistance place have them choose a new provider, update contact numbers and schedule visit.
0 = independent

## 2020-04-13 NOTE — PROGRESS NOTES
Consent: Nora Martin, who was seen by synchronous (real-time) audio-video technology, and/or his healthcare decision maker, is aware that this patient-initiated, Telehealth encounter on 4/14/2020 is a billable service, with coverage as determined by his insurance carrier. He is aware that he may receive a bill and has provided verbal consent to proceed: Yes. Assessment & Plan:   Diagnoses and all orders for this visit:    1. Cough    2. Wheezing        Asthmatic bronchitis  Claritin/singulair 10 mg every day for 30 days  F/U worsening or unimproved 7 days  OV 3 mos or prn  I have explained plan to patient and the patient verbalizes understanding    Follow-up and Dispositions    · Return if symptoms worsen or fail to improve. I spent at least 15 minutes with this established patient, and >50% of the time was spent counseling and/or coordinating care regarding medical eval  712  Subjective:   Nora Martin is a 71 y.o. male who was seen for Cough  3 weeks of head congestion, facial discomfort, NP cough with wheezing w/o fever, dyspnea, CP, or N  Given azithromycin/prednisone on 4/2/20 with improvement  Patient denies obvious COVID 19 exposures (close contacts) and again denies associated fever/myalgias  CXR 3/2/20 NAD        Prior to Admission medications    Medication Sig Start Date End Date Taking? Authorizing Provider   predniSONE (STERAPRED DS) 10 mg dose pack See administration instruction per 10mg dose pack 4/2/20   Helen Cortez MD   simvastatin (ZOCOR) 20 mg tablet TAKE 1 TABLET BY MOUTH NIGHTLY FOR DYSLIPIDEMIA 2/19/20   Delcia Po, NP   senna-docusate (DOK PLUS) 8.6-50 mg per tablet Take 1 Tab by mouth two (2) times a day. FOR CHRONIC CONSTIPATION 2/10/20   Delcia Po, NP   multivit-min-folic-vit K-lycop (ONE-A-DAY MEN'S MULTIVITAMIN) 400- mcg tab Take 1 Tab by mouth daily.  2/10/20   Delcia Po, NP   ferrous sulfate 220 mg (44 mg iron)/5 mL oral elixir Take 6.8 mL by mouth two (2) times a day. 2/10/20   Leda Hodge NP   carvediloL (COREG) 3.125 mg tablet TAKE 1 TABLET BY MOUTH TWICE DAILY WITH MEALS (BKFST & SUPPER) FOR HTN 1/24/20   Leda Hodge NP   omeprazole (PRILOSEC) 20 mg capsule TAKE ONE CAPSULE BY MOUTH ONCE DAILY 30MINS BEFORE A MEAL 1/24/20   Leda Hodge NP   Omeprazole delayed release (PRILOSEC D/R) 20 mg tablet Take 1 Tab by mouth daily. 30 mins before breakfast 12/30/19   Charly Cortez MD   lactulose (CHRONULAC) 10 gram/15 mL solution TAKE 2 TABLESPOONFULS (20 GRAMS) BY MOUTH TWICE DAILY 10/21/19   Charly Cortez MD   enalapril (VASOTEC) 10 mg tablet TAKE 1 TABLET BY MOUTH TWICE DAILY FOR HTN 10/21/19   Charly Cortez MD   hydrocortisone (HYTONE) 2.5 % topical cream Apply  to affected area three (3) times daily as needed (rash). use thin layer 8/7/19   Edmundo Banda MD   ondansetron (ZOFRAN ODT) 4 mg disintegrating tablet Take 1 Tab by mouth every eight (8) hours as needed for Nausea. 5/1/19   Leda Hodge NP   cyanocobalamin (VITAMIN B-12) 2,500 mcg sublingual tablet TAKE 1 TABLET BY MOUTH ONCE DAILY 5/1/19   Leda Hodge NP   sorbitol 70 % solution TAKE 30ML BY MOUTH TWICE DAILY FOR LAXATIVE 5/1/19   Leda Hodge NP   Swab (TOOTHETTE) swab Sig: Use as needed 5/1/19   Leda Hodge NP   therapeutic multivitamin-minerals [facility]) tablet Take 1 Tab by mouth daily. 1/23/19   Jeremy Cobb S, DO   sucralfate (CARAFATE) 1 gram tablet TAKE 1 TABLET BY MOUTH FOUR TIMES DAILY AFTER MEALS AND AT BEDTIME FOR GERD 4/19/18   Jeremy Cobb S, DO     No Known Allergies    Current Outpatient Medications   Medication Sig Dispense Refill    predniSONE (STERAPRED DS) 10 mg dose pack See administration instruction per 10mg dose pack 21 Tab 0    simvastatin (ZOCOR) 20 mg tablet TAKE 1 TABLET BY MOUTH NIGHTLY FOR DYSLIPIDEMIA 30 Tab 2    senna-docusate (DOK PLUS) 8.6-50 mg per tablet Take 1 Tab by mouth two (2) times a day.  FOR CHRONIC CONSTIPATION 60 Tab 3    multivit-min-folic-vit K-lycop (ONE-A-DAY MEN'S MULTIVITAMIN) 400- mcg tab Take 1 Tab by mouth daily. 30 Tab 3    ferrous sulfate 220 mg (44 mg iron)/5 mL oral elixir Take 6.8 mL by mouth two (2) times a day. 473 mL 1    carvediloL (COREG) 3.125 mg tablet TAKE 1 TABLET BY MOUTH TWICE DAILY WITH MEALS (BKFST & SUPPER) FOR HTN 60 Tab 3    omeprazole (PRILOSEC) 20 mg capsule TAKE ONE CAPSULE BY MOUTH ONCE DAILY 30MINS BEFORE A MEAL 30 Cap 4    Omeprazole delayed release (PRILOSEC D/R) 20 mg tablet Take 1 Tab by mouth daily. 30 mins before breakfast 30 Tab 0    lactulose (CHRONULAC) 10 gram/15 mL solution TAKE 2 TABLESPOONFULS (20 GRAMS) BY MOUTH TWICE DAILY 1892 mL 2    enalapril (VASOTEC) 10 mg tablet TAKE 1 TABLET BY MOUTH TWICE DAILY FOR HTN 60 Tab 4    hydrocortisone (HYTONE) 2.5 % topical cream Apply  to affected area three (3) times daily as needed (rash). use thin layer 60 g 3    ondansetron (ZOFRAN ODT) 4 mg disintegrating tablet Take 1 Tab by mouth every eight (8) hours as needed for Nausea. 30 Tab 3    cyanocobalamin (VITAMIN B-12) 2,500 mcg sublingual tablet TAKE 1 TABLET BY MOUTH ONCE DAILY 90 Tab 2    sorbitol 70 % solution TAKE 30ML BY MOUTH TWICE DAILY FOR LAXATIVE 473 mL PRN    Swab (TOOTHETTE) swab Sig: Use as needed 600 Each 3    therapeutic multivitamin-minerals (THERAGRAN-M) tablet Take 1 Tab by mouth daily. 30 Tab 0    sucralfate (CARAFATE) 1 gram tablet TAKE 1 TABLET BY MOUTH FOUR TIMES DAILY AFTER MEALS AND AT BEDTIME FOR GERD 120 Tab 0     No Known Allergies  Past Medical History:   Diagnosis Date    Constipation, chronic     Dyslipidemia 4/8/2014    Femur fracture ([address] 7/7/16    brace at right femur    GERD (gastroesophageal reflux disease)     Hypertension     MR (mental retardation), severe     Muscle weakness of lower extremity     with spasticity    Urinary incontinence due to cognitive impairment 8/23/2012     No past surgical history on file.     ROS  Per HPI    Objective: There were no vitals taken for this visit. General: Wheelchair bound   Mental  status: Minimal verbal responses   HENT: NCAT   Neck: no visualized mass   Resp: no respiratory distress   Neuro: Wheelchair bound d/t cerebral palsy   Skin: no discoloration or lesions of concern on visible areas   Psychiatric:      Additional exam findings: none      We discussed the expected course, resolution and complications of the diagnosis(es) in detail. Medication risks, benefits, costs, interactions, and alternatives were discussed as indicated. I advised him to contact the office if his condition worsens, changes or fails to improve as anticipated. He expressed understanding with the diagnosis(es) and plan. Aurea Ramirez is a 71 y.o. male being evaluated by a video visit encounter for concerns as above. A caregiver was present when appropriate. Due to this being a TeleHealth encounter (During YDLVE-84 public health emergency), evaluation of the following organ systems was limited: Vitals/Constitutional/EENT/Resp/CV/GI//MS/Neuro/Skin/Heme-Lymph-Imm. Pursuant to the emergency declaration under the [facility], 1135 waiver authority and the [employer] Act, this Virtual  Visit was conducted, with patient's (and/or legal guardian's) consent, to reduce the patient's risk of exposure to COVID-19 and provide necessary medical care. Services were provided through a video synchronous discussion virtually to substitute for in-person clinic visit. Patient and provider were located at their individual homes.         Hali Woods MD

## 2020-04-14 ENCOUNTER — PATIENT MESSAGE (OUTPATIENT)
Dept: FAMILY MEDICINE CLINIC | Age: 65
End: 2020-04-14

## 2020-04-14 ENCOUNTER — VIRTUAL VISIT (OUTPATIENT)
Dept: FAMILY MEDICINE CLINIC | Age: 65
End: 2020-04-14

## 2020-04-14 DIAGNOSIS — R06.2 WHEEZING: ICD-10-CM

## 2020-04-14 DIAGNOSIS — R05.9 COUGH: Primary | ICD-10-CM

## 2020-04-14 RX ORDER — LORATADINE 10 MG/1
TABLET ORAL
Qty: 30 TAB | Refills: 0 | Status: SHIPPED | OUTPATIENT
Start: 2020-04-14

## 2020-04-14 RX ORDER — MONTELUKAST SODIUM 10 MG/1
TABLET ORAL
Qty: 30 TAB | Refills: 0 | Status: SHIPPED | OUTPATIENT
Start: 2020-04-14

## 2020-04-14 NOTE — TELEPHONE ENCOUNTER
----- Message from Hardik Coronado sent at 4/14/2020 10:01 AM EDT -----  Regarding: Non-Urgent Medical Question  Contact: [phone]  Good morning can we be sure that the medication sent for Phillip Faye is a liquid. I believe I mentioned it this morning but I wanted to be sure.  Thanks

## 2021-01-06 LAB — SARS-COV-2, NAA: NOT DETECTED

## 2021-02-06 PROBLEM — R65.10 SIRS (SYSTEMIC INFLAMMATORY RESPONSE SYNDROME) (HCC): Status: ACTIVE | Noted: 2021-02-06

## 2021-02-06 PROBLEM — U07.1 COVID-19: Status: ACTIVE | Noted: 2021-02-06

## 2022-03-11 PROBLEM — R00.0 TACHYCARDIA: Status: ACTIVE | Noted: 2020-03-12

## 2022-03-11 PROBLEM — R11.10 REGURGITATION OF FOOD: Status: ACTIVE | Noted: 2021-03-22

## 2022-03-11 PROBLEM — G80.0 SPASTIC QUADRIPLEGIC CEREBRAL PALSY (HCC): Status: ACTIVE | Noted: 2018-08-22

## 2022-03-11 PROBLEM — K21.00 GASTROESOPHAGEAL REFLUX DISEASE WITH ESOPHAGITIS: Status: ACTIVE | Noted: 2021-07-13

## 2022-03-11 PROBLEM — K44.9 PARAESOPHAGEAL HERNIA: Status: ACTIVE | Noted: 2021-03-22

## 2022-03-11 PROBLEM — E72.20 HYPERAMMONEMIA (HCC): Status: ACTIVE | Noted: 2018-08-21

## 2022-03-11 PROBLEM — G93.40 ENCEPHALOPATHY ACUTE: Status: ACTIVE | Noted: 2018-08-21

## 2022-03-11 PROBLEM — R41.82 ALTERED MENTAL STATUS: Status: ACTIVE | Noted: 2018-08-21

## 2022-03-11 PROBLEM — R06.2 WHEEZING: Status: ACTIVE | Noted: 2021-03-22

## 2022-07-19 NOTE — PROGRESS NOTES
Chief Complaint Patient presents with   Follow-up of chronic medical problems. HPI: 
Patient is a 58year old  male with medical problems listed below presents today for follow up of GERD, Hypertension, Hyperlipidemia, Vit D def, etc. Group home staff who accompanied him to today's visit stated that he has been feeling well with no significant complaints reported. In addition, she also stated that he has been taking his medications with no adverse effects noted. Group home staff is requesting refill of several medications today. Past Medical History:  
Diagnosis Date  Constipation, chronic  Dyslipidemia 4/8/2014  Femur fracture ([address] 7/7/16  
 brace at right femur  GERD (gastroesophageal reflux disease)  Hypertension  MR (mental retardation), severe  Muscle weakness of lower extremity   
 with spasticity  Urinary incontinence due to cognitive impairment 8/23/2012 No Known Allergies Current Outpatient Medications Medication Sig Dispense Refill  sucralfate (CARAFATE) 100 mg/mL suspension TAKE 10ML (1GM) BY MOUTH FOUR TIMES DAILY FOR GERD 840 mL 0  
 ferrous sulfate 220 mg (44 mg iron)/5 mL oral elixir TAKE 6.8ML (300MG) BY MOUTH TWICE DAILY 473 mL 0  
 lactulose (CHRONULAC) 10 gram/15 mL solution TAKE 2 TABLESPOONFULS (20 GRAMS) BY MOUTH TWICE DAILY 1892 mL 0  
 carvedilol (COREG) 3.125 mg tablet TAKE 1 TABLET BY MOUTH TWICE DAILY WITH MEALS (BKFST & SUPPER) FOR HTN 60 Tab 3  
 multivit-min-folic-vit K-lycop (ONE-A-DAY MEN'S MULTIVITAMIN) 400- mcg tab TAKE 1 TABLET BY MOUTH ONCE DAILY 30 Tab 3  
 simvastatin (ZOCOR) 20 mg tablet TAKE 1 TABLET BY MOUTH NIGHTLY FOR DYSLIPIDEMIA 30 Tab 3  
 ergocalciferol (VITAMIN D2) 50,000 unit capsule TAKE 1 CAPSULE BY MOUTH EVERY 7 DAYS 4 Cap 3  cyanocobalamin (VITAMIN B-12) 2,500 mcg sublingual tablet TAKE 1 TABLET BY MOUTH ONCE DAILY 100 Tab 0  
 Tsh improved, no change to levothyroxine  Follow-up as planned fall 2022.    ondansetron (ZOFRAN ODT) 4 mg disintegrating tablet Take 1 Tab by mouth every eight (8) hours as needed for Nausea. 30 Tab 0  
 potassium chloride (KLOR-CON) 10 mEq tablet TAKE 1 TABLET BY MOUTH ONCE DAILY FOR LOW BLOOD LEVELS (MAY CRUSH ANDTAKE WITH APPLESAUCE) 30 Tab 0  
 benzonatate (TESSALON) 200 mg capsule Take 1 Cap by mouth three (3) times daily as needed for Cough. 90 Cap 1  Swab (TOOTHETTE) swab Sig: Use as needed 600 Each 3  
 senna-docusate (DOK PLUS) 8.6-50 mg per tablet TAKE 1 TABLET BY MOUTH TWICE DAILY FOR CHRONIC CONSTIPATION 60 Tab 3  
 sorbitol 70 % solution TAKE 30ML BY MOUTH TWICE DAILY FOR LAXATIVE 473 mL PRN  
 enalapril (VASOTEC) 10 mg tablet Take 1 Tab by mouth two (2) times a day. 30 Tab 0  
 therapeutic multivitamin-minerals (THERAGRAN-M) tablet Take 1 Tab by mouth daily. 30 Tab 0  
 raNITIdine (ZANTAC) 150 mg tablet TAKE 1 TABLET BY MOUTH TWICE DAILY 60 Tab 3  
 sucralfate (CARAFATE) 1 gram tablet TAKE 1 TABLET BY MOUTH FOUR TIMES DAILY AFTER MEALS AND AT BEDTIME FOR GERD 120 Tab 0  
 
 
  
ROS: 
Unable to review as pt mentally challenged Physical Exam: 
Visit Vitals /80 Pulse 71 Temp 98.3 °F (36.8 °C) (Oral) Resp 18 Ht 4' 10\" (1.473 m) SpO2 94% BMI 19.65 kg/m² General: Black male in wheelchair, awake, afebrile, in no acute distress Respiratory: symmetrical chest expansion, lung sounds clear bilaterally, good air entry, good respiratory effort, no wheezes or crackles Cardiovascular: normal S1S2, regular rate and rhythm, no murmurs, pulses palpable, no thrill, no carotid or abdominal bruits, no JVD Abdomen: soft, non-distended, normoactive bowel sounds x 4 quadrants. Extremity: No edema noted Assessment/Plan: ICD-10-CM ICD-9-CM 1. Encounter for long-term (current) use of medications Z79.899 V58.69 CBC WITH AUTOMATED DIFF  
   METABOLIC PANEL, COMPREHENSIVE 2.  Gastroesophageal reflux disease without esophagitis K21.9 530.81 sucralfate (CARAFATE) 100 mg/mL suspension 3. Dyslipidemia E78.5 272.4 simvastatin (ZOCOR) 20 mg tablet 4. Non-intractable vomiting with nausea, unspecified vomiting type R11.2 787.01 ondansetron (ZOFRAN ODT) 4 mg disintegrating tablet 5. Hypokalemia E87.6 276.8 potassium chloride (KLOR-CON) 10 mEq tablet 6. Constipation, chronic K59.09 564.00 senna-docusate (DOK PLUS) 8.6-50 mg per tablet  
   sorbitol 70 % solution Orders Placed This Encounter  CBC WITH AUTOMATED DIFF Standing Status:   Future Standing Expiration Date:   1/18/2020  METABOLIC PANEL, COMPREHENSIVE Standing Status:   Future Standing Expiration Date:   1/18/2020  
 sucralfate (CARAFATE) 100 mg/mL suspension Sig: TAKE 10ML (1GM) BY MOUTH FOUR TIMES DAILY FOR GERD Dispense:  840 mL Refill:  0  
 ferrous sulfate 220 mg (44 mg iron)/5 mL oral elixir Sig: TAKE 6.8ML (300MG) BY MOUTH TWICE DAILY Dispense:  473 mL Refill:  0  
 lactulose (CHRONULAC) 10 gram/15 mL solution Sig: TAKE 2 TABLESPOONFULS (20 GRAMS) BY MOUTH TWICE DAILY Dispense:  1892 mL Refill:  0  
 carvedilol (COREG) 3.125 mg tablet Sig: TAKE 1 TABLET BY MOUTH TWICE DAILY WITH MEALS (BKFST & SUPPER) FOR HTN Dispense:  60 Tab Refill:  3  
 multivit-min-folic-vit K-lycop (ONE-A-DAY MEN'S MULTIVITAMIN) 400- mcg tab Sig: TAKE 1 TABLET BY MOUTH ONCE DAILY Dispense:  30 Tab Refill:  3  
 simvastatin (ZOCOR) 20 mg tablet Sig: TAKE 1 TABLET BY MOUTH NIGHTLY FOR DYSLIPIDEMIA Dispense:  30 Tab Refill:  3  
 DISCONTD: ergocalciferol (VITAMIN D2) 50,000 unit capsule Sig: TAKE 1 CAPSULE BY MOUTH EVERY 7 DAYS Dispense:  4 Cap Refill:  3  cyanocobalamin (VITAMIN B-12) 2,500 mcg sublingual tablet Sig: TAKE 1 TABLET BY MOUTH ONCE DAILY Dispense:  100 Tab Refill:  0  
 ondansetron (ZOFRAN ODT) 4 mg disintegrating tablet Sig: Take 1 Tab by mouth every eight (8) hours as needed for Nausea. Dispense:  30 Tab Refill:  0  
 potassium chloride (KLOR-CON) 10 mEq tablet Sig: TAKE 1 TABLET BY MOUTH ONCE DAILY FOR LOW BLOOD LEVELS (MAY CRUSH ANDTAKE WITH APPLESAUCE) Dispense:  30 Tab Refill:  0  
 benzonatate (TESSALON) 200 mg capsule Sig: Take 1 Cap by mouth three (3) times daily as needed for Cough. Dispense:  90 Cap Refill:  1  Swab (TOOTHETTE) swab Sig: Sig: Use as needed Dispense:  600 Each Refill:  3  
 senna-docusate (DOK PLUS) 8.6-50 mg per tablet Sig: TAKE 1 TABLET BY MOUTH TWICE DAILY FOR CHRONIC CONSTIPATION Dispense:  60 Tab Refill:  3  
 sorbitol 70 % solution Sig: TAKE 30ML BY MOUTH TWICE DAILY FOR LAXATIVE Dispense:  473 mL Refill:  PRN  
 enalapril (VASOTEC) 10 mg tablet Sig: Take 1 Tab by mouth two (2) times a day. Dispense:  30 Tab Refill:  0  
 therapeutic multivitamin-minerals (THERAGRAN-M) tablet Sig: Take 1 Tab by mouth daily. Dispense:  30 Tab Refill:  0 Group home paperwork filled out Recent labs reviewed with the group home staff with vitamin D increased at 110 she was advised to stop giving patient vitamin D. Additional Notes: Discussed today's diagnosis, treatment plans. Discussed medication indications and side effects. After Visit Summary: Discussed provided printed patient instructions. Answered questions accordingly. Follow-up Disposition: In 3 months with labs 1 week prior Abdoul Bowden DO, MPH Internal Medicine Total time spent for today's visit was 25 minutes with greater than 50% of time spent involved in counseling and coordination of care as outlined above.
